# Patient Record
Sex: FEMALE | Race: WHITE | Employment: OTHER | URBAN - METROPOLITAN AREA
[De-identification: names, ages, dates, MRNs, and addresses within clinical notes are randomized per-mention and may not be internally consistent; named-entity substitution may affect disease eponyms.]

---

## 2017-09-21 PROBLEM — G43.009 ATYPICAL MIGRAINE: Status: ACTIVE | Noted: 2017-09-21

## 2017-09-21 PROBLEM — N18.30 STAGE 3 CHRONIC KIDNEY DISEASE (HCC): Chronic | Status: ACTIVE | Noted: 2017-09-21

## 2017-09-21 PROBLEM — E11.29 TYPE 2 DIABETES MELLITUS WITH RENAL COMPLICATION (HCC): Status: ACTIVE | Noted: 2017-09-21

## 2017-09-21 PROBLEM — R29.90 STROKE-LIKE SYMPTOMS: Status: ACTIVE | Noted: 2017-09-21

## 2017-09-21 PROBLEM — E78.5 HLD (HYPERLIPIDEMIA): Status: ACTIVE | Noted: 2017-09-21

## 2017-09-21 PROBLEM — I10 ESSENTIAL HYPERTENSION: Status: ACTIVE | Noted: 2017-09-21

## 2018-08-03 DIAGNOSIS — M54.17 L-S RADICULOPATHY: ICD-10-CM

## 2018-08-03 RX ORDER — GABAPENTIN 400 MG/1
400 CAPSULE ORAL 3 TIMES DAILY
Qty: 270 CAPSULE | Refills: 2 | Status: SHIPPED | OUTPATIENT
Start: 2018-08-03 | End: 2019-01-22 | Stop reason: SDUPTHER

## 2018-09-19 ENCOUNTER — TELEPHONE (OUTPATIENT)
Dept: NEUROLOGY | Age: 53
End: 2018-09-19

## 2019-01-22 ENCOUNTER — TELEPHONE (OUTPATIENT)
Dept: NEUROLOGY | Age: 54
End: 2019-01-22

## 2019-01-22 ENCOUNTER — OFFICE VISIT (OUTPATIENT)
Dept: NEUROLOGY | Age: 54
End: 2019-01-22
Payer: COMMERCIAL

## 2019-01-22 VITALS
HEART RATE: 50 BPM | WEIGHT: 171 LBS | RESPIRATION RATE: 12 BRPM | OXYGEN SATURATION: 98 % | HEIGHT: 64 IN | TEMPERATURE: 97.1 F | BODY MASS INDEX: 29.19 KG/M2 | SYSTOLIC BLOOD PRESSURE: 120 MMHG | DIASTOLIC BLOOD PRESSURE: 65 MMHG

## 2019-01-22 DIAGNOSIS — G43.109 MIGRAINE WITH AURA AND WITHOUT STATUS MIGRAINOSUS, NOT INTRACTABLE: Primary | ICD-10-CM

## 2019-01-22 DIAGNOSIS — M54.17 L-S RADICULOPATHY: ICD-10-CM

## 2019-01-22 PROBLEM — R29.90 STROKE-LIKE SYMPTOMS: Status: RESOLVED | Noted: 2017-09-21 | Resolved: 2019-01-22

## 2019-01-22 PROCEDURE — 99214 OFFICE O/P EST MOD 30 MIN: CPT | Performed by: NURSE PRACTITIONER

## 2019-01-22 RX ORDER — RIZATRIPTAN BENZOATE 5 MG/1
5 TABLET ORAL
Qty: 9 TABLET | Refills: 3 | Status: SHIPPED | OUTPATIENT
Start: 2019-01-22 | End: 2019-07-11 | Stop reason: HOSPADM

## 2019-01-22 RX ORDER — LOSARTAN POTASSIUM 50 MG/1
1 TABLET ORAL DAILY
Refills: 1 | COMMUNITY
Start: 2018-10-26

## 2019-01-22 RX ORDER — GABAPENTIN 400 MG/1
400 CAPSULE ORAL 3 TIMES DAILY
Qty: 270 CAPSULE | Refills: 1 | Status: SHIPPED | OUTPATIENT
Start: 2019-01-22 | End: 2019-12-23

## 2019-01-22 RX ORDER — TOPIRAMATE 50 MG/1
75 TABLET, FILM COATED ORAL 2 TIMES DAILY
Qty: 180 TABLET | Refills: 3 | Status: SHIPPED | OUTPATIENT
Start: 2019-01-22 | End: 2019-07-11

## 2019-01-24 ENCOUNTER — HOSPITAL ENCOUNTER (EMERGENCY)
Age: 54
Discharge: HOME | End: 2019-01-24
Attending: EMERGENCY MEDICINE
Payer: COMMERCIAL

## 2019-01-24 ENCOUNTER — APPOINTMENT (OUTPATIENT)
Dept: CT IMAGING | Age: 54
End: 2019-01-24
Payer: COMMERCIAL

## 2019-01-24 VITALS
TEMPERATURE: 98.4 F | HEART RATE: 61 BPM | SYSTOLIC BLOOD PRESSURE: 138 MMHG | RESPIRATION RATE: 16 BRPM | DIASTOLIC BLOOD PRESSURE: 75 MMHG | HEIGHT: 63 IN | BODY MASS INDEX: 29.23 KG/M2 | WEIGHT: 165 LBS | OXYGEN SATURATION: 97 %
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
## 2019-01-24 DIAGNOSIS — R51.9 NONINTRACTABLE HEADACHE, UNSPECIFIED CHRONICITY PATTERN, UNSPECIFIED HEADACHE TYPE: ICD-10-CM

## 2019-01-24 PROCEDURE — 99283 EMERGENCY DEPT VISIT LOW MDM: CPT

## 2019-01-24 PROCEDURE — 72125 CT NECK SPINE W/O DYE: CPT

## 2019-01-24 PROCEDURE — 6360000002 HC RX W HCPCS: Performed by: EMERGENCY MEDICINE

## 2019-01-24 PROCEDURE — 70450 CT HEAD/BRAIN W/O DYE: CPT

## 2019-01-24 PROCEDURE — 96375 TX/PRO/DX INJ NEW DRUG ADDON: CPT

## 2019-01-24 PROCEDURE — 2580000003 HC RX 258: Performed by: EMERGENCY MEDICINE

## 2019-01-24 PROCEDURE — 96374 THER/PROPH/DIAG INJ IV PUSH: CPT

## 2019-01-24 RX ORDER — NAPROXEN 375 MG/1
375 TABLET ORAL 2 TIMES DAILY
Qty: 60 TABLET | Refills: 0 | Status: SHIPPED | OUTPATIENT
Start: 2019-01-24 | End: 2019-08-14 | Stop reason: ALTCHOICE

## 2019-01-24 RX ORDER — DIPHENHYDRAMINE HYDROCHLORIDE 50 MG/ML
50 INJECTION INTRAMUSCULAR; INTRAVENOUS ONCE
Status: COMPLETED | OUTPATIENT
Start: 2019-01-24 | End: 2019-01-24

## 2019-01-24 RX ORDER — 0.9 % SODIUM CHLORIDE 0.9 %
1000 INTRAVENOUS SOLUTION INTRAVENOUS ONCE
Status: COMPLETED | OUTPATIENT
Start: 2019-01-24 | End: 2019-01-24

## 2019-01-24 RX ORDER — KETOROLAC TROMETHAMINE 30 MG/ML
15 INJECTION, SOLUTION INTRAMUSCULAR; INTRAVENOUS ONCE
Status: COMPLETED | OUTPATIENT
Start: 2019-01-24 | End: 2019-01-24

## 2019-01-24 RX ADMIN — SODIUM CHLORIDE 1000 ML: 9 INJECTION, SOLUTION INTRAVENOUS at 13:47

## 2019-01-24 RX ADMIN — PROCHLORPERAZINE EDISYLATE 10 MG: 5 INJECTION INTRAMUSCULAR; INTRAVENOUS at 13:56

## 2019-01-24 RX ADMIN — KETOROLAC TROMETHAMINE: 30 INJECTION, SOLUTION INTRAMUSCULAR; INTRAVENOUS at 13:48

## 2019-01-24 RX ADMIN — DIPHENHYDRAMINE HYDROCHLORIDE 50 MG: 50 INJECTION INTRAMUSCULAR; INTRAVENOUS at 13:49

## 2019-01-24 ASSESSMENT — ENCOUNTER SYMPTOMS
SORE THROAT: 0
SHORTNESS OF BREATH: 0
VOMITING: 0
SINUS PRESSURE: 0
EYE DISCHARGE: 0
BACK PAIN: 0
COUGH: 0
ABDOMINAL DISTENTION: 0
EYE PAIN: 0
EYE REDNESS: 0
WHEEZING: 0
DIARRHEA: 0
NAUSEA: 0

## 2019-01-24 ASSESSMENT — PAIN DESCRIPTION - PAIN TYPE: TYPE: ACUTE PAIN

## 2019-01-24 ASSESSMENT — PAIN DESCRIPTION - LOCATION: LOCATION: HEAD

## 2019-01-24 ASSESSMENT — PAIN SCALES - GENERAL
PAINLEVEL_OUTOF10: 2
PAINLEVEL_OUTOF10: 9
PAINLEVEL_OUTOF10: 9

## 2019-04-29 LAB
ALBUMIN SERPL-MCNC: 4.5 G/DL
ALP BLD-CCNC: 95 U/L
ALT SERPL-CCNC: 23 U/L
ANION GAP SERPL CALCULATED.3IONS-SCNC: 9 MMOL/L
AST SERPL-CCNC: 22 U/L
BILIRUB SERPL-MCNC: 0.9 MG/DL (ref 0.1–1.4)
BUN BLDV-MCNC: 20 MG/DL
CALCIUM SERPL-MCNC: 10.1 MG/DL
CHLORIDE BLD-SCNC: 109 MMOL/L
CHOLESTEROL, TOTAL: 144 MG/DL
CHOLESTEROL/HDL RATIO: NORMAL
CO2: 26 MMOL/L
CREAT SERPL-MCNC: 1.18 MG/DL
GFR CALCULATED: NORMAL
GLUCOSE BLD-MCNC: 112 MG/DL
HDLC SERPL-MCNC: 43 MG/DL (ref 35–70)
LDL CHOLESTEROL CALCULATED: 82 MG/DL (ref 0–160)
POTASSIUM SERPL-SCNC: 3.9 MMOL/L
SODIUM BLD-SCNC: 144 MMOL/L
TOTAL PROTEIN: 7.2
TRIGL SERPL-MCNC: 114 MG/DL
VLDLC SERPL CALC-MCNC: NORMAL MG/DL

## 2019-05-27 ENCOUNTER — HOSPITAL ENCOUNTER (EMERGENCY)
Age: 54
Discharge: HOME | End: 2019-05-27
Attending: EMERGENCY MEDICINE
Payer: COMMERCIAL

## 2019-05-27 VITALS
TEMPERATURE: 99.1 F | WEIGHT: 169 LBS | DIASTOLIC BLOOD PRESSURE: 74 MMHG | SYSTOLIC BLOOD PRESSURE: 112 MMHG | RESPIRATION RATE: 16 BRPM | HEIGHT: 63 IN | OXYGEN SATURATION: 95 % | BODY MASS INDEX: 29.95 KG/M2 | HEART RATE: 77 BPM

## 2019-05-27 DIAGNOSIS — K04.7 DENTAL ABSCESS: Primary | ICD-10-CM

## 2019-05-27 PROCEDURE — 99282 EMERGENCY DEPT VISIT SF MDM: CPT

## 2019-05-27 PROCEDURE — 6370000000 HC RX 637 (ALT 250 FOR IP): Performed by: EMERGENCY MEDICINE

## 2019-05-27 RX ORDER — CLINDAMYCIN HYDROCHLORIDE 150 MG/1
300 CAPSULE ORAL ONCE
Status: COMPLETED | OUTPATIENT
Start: 2019-05-27 | End: 2019-05-27

## 2019-05-27 RX ORDER — CLINDAMYCIN HYDROCHLORIDE 150 MG/1
150 CAPSULE ORAL 3 TIMES DAILY
Qty: 30 CAPSULE | Refills: 0 | Status: SHIPPED | OUTPATIENT
Start: 2019-05-27 | End: 2019-06-06

## 2019-05-27 RX ADMIN — CLINDAMYCIN HYDROCHLORIDE 300 MG: 150 CAPSULE ORAL at 11:43

## 2019-05-27 ASSESSMENT — PAIN DESCRIPTION - ORIENTATION: ORIENTATION: LOWER;LEFT

## 2019-05-27 ASSESSMENT — PAIN DESCRIPTION - LOCATION: LOCATION: TEETH

## 2019-05-27 ASSESSMENT — PAIN DESCRIPTION - DESCRIPTORS: DESCRIPTORS: THROBBING;STABBING

## 2019-05-27 ASSESSMENT — PAIN DESCRIPTION - PROGRESSION: CLINICAL_PROGRESSION: GRADUALLY WORSENING

## 2019-05-27 ASSESSMENT — PAIN SCALES - GENERAL: PAINLEVEL_OUTOF10: 10

## 2019-05-27 ASSESSMENT — PAIN DESCRIPTION - FREQUENCY: FREQUENCY: CONTINUOUS

## 2019-05-27 ASSESSMENT — PAIN DESCRIPTION - PAIN TYPE: TYPE: ACUTE PAIN

## 2019-05-27 NOTE — ED PROVIDER NOTES
HPI: Joy Ritter 62 y.o. female with a past medical history of   Past Medical History:   Diagnosis Date    Allergic 1980    PCN, Most Pain medications,    Anemia 1997    Anxiety 2007    Arthritis     Asthma 1968    Chronic kidney disease 1989    insufficincy    COPD (chronic obstructive pulmonary disease) ([address]     Crohn disease ([address] 2006    Crohn's disease ([address]     Diabetes mellitus ([address]     Essential hypertension 9/21/2017    Hyperlipidemia     Obesity 2000    Raynaud disease     Sleep apnea     wears CPAP    Tuberculosis 1980    presents with a complaint of dental pain. The patient states this pain has been gradual in onset, persistent, moderate in severity and worse today which is what prompted the visit. Pain has not been relieved with any OTC medications. Patient denies any unilateral facial swelling. Patient is able to handle their own secretions and drink fluids without difficulty. Patient denies any fever. The patient also denies any history of dental trauma. Denies difficulty breathing or swallowing. The location of the pain and appears to be isolated over tooth number 21.     ROS:   Pertinent positives and negatives are stated within HPI, all other systems reviewed and are negative.  --------------------------------------------- PAST HISTORY ---------------------------------------------  Past Medical History:  has a past medical history of Allergic, Anemia, Anxiety, Arthritis, Asthma, Chronic kidney disease, COPD (chronic obstructive pulmonary disease) ([address] Crohn disease ([address] Crohn's disease ([address] Diabetes mellitus ([address] Essential hypertension, Hyperlipidemia, Obesity, Raynaud disease, Sleep apnea, and Tuberculosis. Past Surgical History:  has a past surgical history that includes Hysterectomy (1997); fracture surgery (2013); Cholecystectomy (20001); Cardiac catheterization (2-); Cardiac catheterization (2007); and Colonoscopy (2005).     Social History:  reports that she has never smoked. She has never used smokeless tobacco. She reports that she drinks about 0.6 oz of alcohol per week. She reports that she does not use drugs. Family History: family history includes Cancer in her father; Diabetes in her brother and brother; Early Death in her sister and sister; Heart Attack in her brother; Heart Disease in her brother and brother. The patients home medications have been reviewed. Allergies: Cephalosporins; Codeine; and Penicillins    ------------------------- NURSING NOTES AND VITALS REVIEWED ---------------------------   The nursing notes within the ED encounter and vital signs as below have been reviewed by myself. /74   Pulse 77   Temp 99.1 °F (37.3 °C) (Oral)   Resp 16   Ht 5' 3\" (1.6 m)   Wt 169 lb (76.7 kg)   SpO2 95%   BMI 29.94 kg/m²   Oxygen Saturation Interpretation: Normal    The patients available past medical records and past encounters were reviewed. Physical exam:  Constitutional: The patient is comfortable, alert and oriented x3, well appearing, non toxic in NAD. Head: Atraumatic and normocephalic. Eyes: No discharge from the eyes the sclerae are normal.  ENT: The oropharynx is normal. No pharyngeal erythema, uvular edema, tonsillar exudates, asymmetry or trismus. Mouth is normal to inspection  With the exception of a pain on percussion of the tooth noted above. There is no evidence of facial asymmetry or abscess formation. Floor of the mouth is soft. No tenderness in the submental or submandibular space. No tongue elevation. Skin: The skin exam shows no evidence of rashes  Neuro: GCS is 15  Lymphatic: No cervical lymphadenopathy       -------------------------------------------------- RESULTS -------------------------------------------------  I have personally reviewed all laboratory and imaging results for this patient. Results are listed below. LABS:  No results found for this visit on 05/27/19.     RADIOLOGY:  Interpreted by Radiologist.  No orders to display               Medical Decision Making: Exam and history c/w  dental pain without evidence of gross infection. At this time we will  the patient on following up in dental clinic and provide pain relief.       Impression:   1) Dental Pain  2) Dental Caries    Disposition: Discharge  Condition: Stable                Grey Oppenheim, MD  05/27/19 9905

## 2019-07-11 ENCOUNTER — OFFICE VISIT (OUTPATIENT)
Dept: NEUROLOGY | Age: 54
End: 2019-07-11
Payer: COMMERCIAL

## 2019-07-11 VITALS
BODY MASS INDEX: 30.54 KG/M2 | DIASTOLIC BLOOD PRESSURE: 69 MMHG | WEIGHT: 178.9 LBS | TEMPERATURE: 97.4 F | HEART RATE: 44 BPM | SYSTOLIC BLOOD PRESSURE: 112 MMHG | OXYGEN SATURATION: 95 % | HEIGHT: 64 IN | RESPIRATION RATE: 12 BRPM

## 2019-07-11 DIAGNOSIS — G43.109 MIGRAINE WITH AURA AND WITHOUT STATUS MIGRAINOSUS, NOT INTRACTABLE: Primary | ICD-10-CM

## 2019-07-11 DIAGNOSIS — M54.17 L-S RADICULOPATHY: ICD-10-CM

## 2019-07-11 PROCEDURE — 99214 OFFICE O/P EST MOD 30 MIN: CPT | Performed by: NURSE PRACTITIONER

## 2019-07-11 RX ORDER — BUTALBITAL, ACETAMINOPHEN AND CAFFEINE 50; 325; 40 MG/1; MG/1; MG/1
1 TABLET ORAL EVERY 4 HOURS PRN
Qty: 20 TABLET | Refills: 3 | Status: SHIPPED | OUTPATIENT
Start: 2019-07-11 | End: 2019-11-06 | Stop reason: SDUPTHER

## 2019-07-11 RX ORDER — TOPIRAMATE 50 MG/1
TABLET, FILM COATED ORAL
Qty: 180 TABLET | Refills: 3
Start: 2019-07-11 | End: 2019-08-14 | Stop reason: ALTCHOICE

## 2019-07-11 SDOH — HEALTH STABILITY: MENTAL HEALTH: HOW OFTEN DO YOU HAVE A DRINK CONTAINING ALCOHOL?: MONTHLY OR LESS

## 2019-07-11 NOTE — PATIENT INSTRUCTIONS
Patient Education        erenumab  Pronunciation:  alfa SUSIE samson mab  Brand:  Thania Sandoval  What is the most important information I should know about erenumab? Follow all directions on your medicine label and package. Tell each of your healthcare providers about all your medical conditions, allergies, and all medicines you use. What is erenumab? Dorsie Laos is a monoclonal antibody that blocks the activation of a certain protein that can produce a migraine attack. This protein, called calcitonin gene- related peptide (CGRP), can cause blood vessels to dilate and cause inflammation and migraine headache pain. Dorsie Laos is used to prevent migraine headaches in adults. Dorsie Laos may also be used for purposes not listed in this medication guide. What should I discuss with my healthcare provider before using erenumab? You should not use erenumab if you are allergic to it. Tell your doctor if you have ever had:  · an allergy to latex. Tell your doctor if you are pregnant or plan to become pregnant. It is not known whether this medicine will harm an unborn baby. However, having migraine headaches during pregnancy may cause complications such as preeclampsia (dangerously high blood pressure that can lead to medical problems in both mother and baby). The benefit of preventing migraines may outweigh any risks to the baby. It may not be safe to breast-feed while using this medicine. Ask your doctor about any risk. Dorsie Laos is not approved for use by anyone younger than 25years old. How should I use erenumab? Follow all directions on your prescription label and read all medication guides or instruction sheets. Use the medicine exactly as directed. Erenumab is injected under the skin, usually once per month. A healthcare provider may teach you how to properly use the medication by yourself. Read and carefully follow any Instructions for Use provided with your medicine.  Do not use erenumab if you don't understand all barbiturates. It relaxes muscle contractions involved in a tension headache. Caffeine is a central nervous system stimulant. It relaxes muscle contractions in blood vessels to improve blood flow. Acetaminophen, butalbital, and caffeine is a combination medicine used to treat tension headaches that are caused by muscle contractions. Acetaminophen, butalbital, and caffeine may also be used for purposes not listed in this medication guide. What should I discuss with my healthcare provider before taking acetaminophen, butalbital, and caffeine? Do not use this medicine if you have taken an MAO inhibitor in the past 14 days. A dangerous drug interaction could occur. MAO inhibitors include isocarboxazid, linezolid, phenelzine, rasagiline, selegiline, and tranylcypromine. You should not use acetaminophen, butalbital, and caffeine if you are allergic to it, if you have porphyria, or if you have recently used alcohol, sedatives, tranquilizers, or other narcotic medications. To make sure acetaminophen, butalbital, and caffeine is safe for you, tell your doctor if you have:  · liver disease, cirrhosis, a history of alcoholism or drug addiction, or if you drink more than 3 alcoholic beverages per day;  · kidney disease;  · asthma, sleep apnea, or other breathing disorder;  · stomach ulcer or bleeding;  · a history of skin rash caused by any medication;  · a history of mental illness or suicidal thoughts; or  · if you use medicine to prevent blood clots. It is not known whether this medicine will harm an unborn baby. If you use butalbital while you are pregnant, your baby could become dependent on the drug. This can cause life-threatening withdrawal symptoms in the baby after it is born. Babies born dependent on habit-forming medicine may need medical treatment for several weeks. Tell your doctor if you are pregnant or plan to become pregnant. This medicine can pass into breast milk and may harm a nursing baby.  Tell your side effects. You may report side effects to FDA at 8-944-FDA-9240. What other drugs will affect acetaminophen, butalbital, and caffeine? Taking this medicine with other drugs that make you sleepy or slow your breathing can cause dangerous or life-threatening side effects. Ask your doctor before taking acetaminophen, butalbital, and caffeine with a sleeping pill, narcotic pain medicine, muscle relaxer, or medicine for anxiety, depression, or seizures. Other drugs may interact with acetaminophen, butalbital, and caffeine, including prescription and over-the-counter medicines, vitamins, and herbal products. Tell each of your health care providers about all medicines you use now and any medicine you start or stop using. Where can I get more information? Your pharmacist can provide more information about acetaminophen, butalbital, and caffeine. Remember, keep this and all other medicines out of the reach of children, never share your medicines with others, and use this medication only for the indication prescribed. Every effort has been made to ensure that the information provided by [address] is accurate, up-to-date, and complete, but no guarantee is made to that effect. Drug information contained herein may be time sensitive. [facility] information has been compiled for use by healthcare practitioners and consumers in the [place] and therefore [place] does not warrant that uses outside of the [place] are appropriate, unless specifically indicated otherwise. [facility]'s drug information does not endorse drugs, diagnose patients or recommend therapy. [facility]s drug information is an informational resource designed to assist licensed healthcare practitioners in caring for their patients and/or to serve consumers viewing this service as a supplement to, and not a substitute for, the expertise, skill, knowledge and judgment of healthcare practitioners.  The absence of a warning for a given

## 2019-07-11 NOTE — PROGRESS NOTES
in past, but med unavailable. She also thinks she failed amitriptyline in the past for prevention   On gabapentin for PN but no change in her headaches    She admits that increased stress is contributing---she continues to have issues with her ex . She notes some issues with word finding at times. She is sleeping fair. She eats and drinks normally but does not exercise    Her LS radiculopathy is stable on gabapentin 400 mg TID. She feels like she \"walks into walls\" at times but no falls or ataxia. No new pains or weakness. Hx lumbar CRISTOPHER in past as well as PT     Per self report, DM remains well controlled. Remains on ASA and Lipitor for hx of TIA    No chest pain or palpitations  No SOB  No vertigo, lightheadedness or loss of consciousness  No incontinence of bowels or bladder  No itching or bruising appreciated  No numbness, tingling or focal arm/leg weakness    ROS otherwise negative    Current Outpatient Medications   Medication Sig Dispense Refill    naproxen (NAPROSYN) 375 MG tablet Take 1 tablet by mouth 2 times daily 60 tablet 0    losartan (COZAAR) 50 MG tablet Take 1 tablet by mouth daily  1    gabapentin (NEURONTIN) 400 MG capsule Take 1 capsule by mouth 3 times daily for 181 days. . 270 capsule 1    topiramate (TOPAMAX) 50 MG tablet Take 1.5 tablets by mouth 2 times daily 180 tablet 3    rizatriptan (MAXALT) 5 MG tablet Take 1 tablet by mouth once as needed for Migraine May repeat in 2 hours if needed 9 tablet 3    butalbital-acetaminophen-caffeine (FIORICET, ESGIC) -40 MG per tablet Take 1 tablet by mouth every 4 hours as needed for Headaches 180 tablet 3    allopurinol (ZYLOPRIM) 100 MG tablet Take 100 mg by mouth daily       Multiple Vitamins-Minerals (WOMENS ONE DAILY PO) Take 1 tablet by mouth daily       diltiazem (CARDIZEM CD) 120 MG ER capsule TAKE 1 CAPSULE DAILY 90 capsule 3    PROVENTIL  (90 BASE) MCG/ACT inhaler Inhale 1 puff into the lungs every 4 hours as

## 2019-07-22 ENCOUNTER — TELEPHONE (OUTPATIENT)
Dept: NEUROLOGY | Age: 54
End: 2019-07-22

## 2019-08-14 ENCOUNTER — HOSPITAL ENCOUNTER (EMERGENCY)
Age: 54
Discharge: HOME | End: 2019-08-14
Payer: COMMERCIAL

## 2019-08-14 ENCOUNTER — APPOINTMENT (OUTPATIENT)
Dept: GENERAL RADIOLOGY | Age: 54
End: 2019-08-14
Payer: COMMERCIAL

## 2019-08-14 VITALS
OXYGEN SATURATION: 99 % | DIASTOLIC BLOOD PRESSURE: 74 MMHG | TEMPERATURE: 98.4 F | HEIGHT: 63 IN | BODY MASS INDEX: 31.36 KG/M2 | SYSTOLIC BLOOD PRESSURE: 132 MMHG | HEART RATE: 58 BPM | WEIGHT: 177 LBS | RESPIRATION RATE: 16 BRPM

## 2019-08-14 DIAGNOSIS — Z23 NEED FOR TDAP VACCINATION: ICD-10-CM

## 2019-08-14 DIAGNOSIS — T14.8XXA ABRASION: ICD-10-CM

## 2019-08-14 DIAGNOSIS — M25.512 ACUTE PAIN OF LEFT SHOULDER: ICD-10-CM

## 2019-08-14 DIAGNOSIS — S70.02XA CONTUSION OF LEFT HIP, INITIAL ENCOUNTER: ICD-10-CM

## 2019-08-14 DIAGNOSIS — S52.125A CLOSED NONDISPLACED FRACTURE OF HEAD OF LEFT RADIUS, INITIAL ENCOUNTER: Primary | ICD-10-CM

## 2019-08-14 PROCEDURE — 90471 IMMUNIZATION ADMIN: CPT | Performed by: PHYSICIAN ASSISTANT

## 2019-08-14 PROCEDURE — 73090 X-RAY EXAM OF FOREARM: CPT

## 2019-08-14 PROCEDURE — 73502 X-RAY EXAM HIP UNI 2-3 VIEWS: CPT

## 2019-08-14 PROCEDURE — 29105 APPLICATION LONG ARM SPLINT: CPT

## 2019-08-14 PROCEDURE — 99284 EMERGENCY DEPT VISIT MOD MDM: CPT

## 2019-08-14 PROCEDURE — 90715 TDAP VACCINE 7 YRS/> IM: CPT | Performed by: PHYSICIAN ASSISTANT

## 2019-08-14 PROCEDURE — 6360000002 HC RX W HCPCS: Performed by: PHYSICIAN ASSISTANT

## 2019-08-14 PROCEDURE — 73030 X-RAY EXAM OF SHOULDER: CPT

## 2019-08-14 RX ORDER — NAPROXEN 500 MG/1
500 TABLET ORAL 2 TIMES DAILY
Qty: 14 TABLET | Refills: 0 | Status: SHIPPED | OUTPATIENT
Start: 2019-08-14 | End: 2019-11-06

## 2019-08-14 RX ADMIN — TETANUS TOXOID, REDUCED DIPHTHERIA TOXOID AND ACELLULAR PERTUSSIS VACCINE, ADSORBED 0.5 ML: 5; 2.5; 8; 8; 2.5 SUSPENSION INTRAMUSCULAR at 19:57

## 2019-08-14 ASSESSMENT — PAIN DESCRIPTION - DESCRIPTORS: DESCRIPTORS: SORE

## 2019-08-14 ASSESSMENT — PAIN DESCRIPTION - ORIENTATION: ORIENTATION: LOWER

## 2019-08-14 ASSESSMENT — PAIN SCALES - GENERAL: PAINLEVEL_OUTOF10: 6

## 2019-08-14 ASSESSMENT — PAIN DESCRIPTION - PAIN TYPE: TYPE: ACUTE PAIN

## 2019-08-14 ASSESSMENT — PAIN DESCRIPTION - LOCATION: LOCATION: ARM

## 2019-08-15 ENCOUNTER — TELEPHONE (OUTPATIENT)
Dept: ADMINISTRATIVE | Age: 54
End: 2019-08-15

## 2019-08-15 NOTE — ED NOTES
Long arm splint placed to left arm per [facility]. Neuros intact pre and post splinting. Discharge instructions and prescription given. Patient states verbal understanding. Ambulatory to exit.        Fritz Douglas RN  08/14/19 8121

## 2019-08-20 DIAGNOSIS — S52.125A CLOSED NONDISPLACED FRACTURE OF HEAD OF LEFT RADIUS, INITIAL ENCOUNTER: Primary | ICD-10-CM

## 2019-08-20 DIAGNOSIS — M25.512 ACUTE PAIN OF LEFT SHOULDER: ICD-10-CM

## 2019-08-21 ENCOUNTER — HOSPITAL ENCOUNTER (OUTPATIENT)
Dept: GENERAL RADIOLOGY | Age: 54
Discharge: HOME | End: 2019-08-23
Payer: COMMERCIAL

## 2019-08-21 ENCOUNTER — OFFICE VISIT (OUTPATIENT)
Dept: ORTHOPEDIC SURGERY | Age: 54
End: 2019-08-21
Payer: COMMERCIAL

## 2019-08-21 VITALS
HEIGHT: 64 IN | SYSTOLIC BLOOD PRESSURE: 135 MMHG | HEART RATE: 41 BPM | BODY MASS INDEX: 30.39 KG/M2 | WEIGHT: 178 LBS | DIASTOLIC BLOOD PRESSURE: 68 MMHG

## 2019-08-21 DIAGNOSIS — M79.642 LEFT HAND PAIN: Primary | ICD-10-CM

## 2019-08-21 DIAGNOSIS — S52.125A CLOSED NONDISPLACED FRACTURE OF HEAD OF LEFT RADIUS, INITIAL ENCOUNTER: ICD-10-CM

## 2019-08-21 DIAGNOSIS — M25.512 ACUTE PAIN OF LEFT SHOULDER: ICD-10-CM

## 2019-08-21 DIAGNOSIS — M79.642 LEFT HAND PAIN: ICD-10-CM

## 2019-08-21 DIAGNOSIS — S52.122A CLOSED DISPLACED FRACTURE OF HEAD OF LEFT RADIUS, INITIAL ENCOUNTER: ICD-10-CM

## 2019-08-21 PROCEDURE — 99202 OFFICE O/P NEW SF 15 MIN: CPT | Performed by: PHYSICIAN ASSISTANT

## 2019-08-21 PROCEDURE — 73030 X-RAY EXAM OF SHOULDER: CPT

## 2019-08-21 PROCEDURE — 73130 X-RAY EXAM OF HAND: CPT

## 2019-08-21 PROCEDURE — 73080 X-RAY EXAM OF ELBOW: CPT

## 2019-08-21 PROCEDURE — 99204 OFFICE O/P NEW MOD 45 MIN: CPT | Performed by: PHYSICIAN ASSISTANT

## 2019-08-21 PROCEDURE — 73090 X-RAY EXAM OF FOREARM: CPT

## 2019-08-21 NOTE — PROGRESS NOTES
Pain     Lt wrist pain     Hand Pain     lt hand and ring finger pain     DOI: 8/14/19 non op left radial head fracture     SUBJECTIVE:  This is a 62year old female presenting to the office for recheck following acute injury on 8/14/2019. Patient states that she fell directly on her left side injuring her left shoulder, elbow, index finger and left hip. Since the original injury she states she has no complaints of left hip pain. Shoulder pain is improving. Continues to note left elbow pain as well as left index finger pain. Denies any numbness or tingling in association. Has remained in her sling, nonweightbearing. Denies any other orthopedic complaints or paresthesias. Denies shortness of breath, chest pain, or calf pain. Denies any constitutional symptoms. Review of Systems   Constitutional: Negative for fever, chills, diaphoresis, appetite change and fatigue. HENT: Negative for dental issues, hearing loss and tinnitus. Negative for congestion, sinus pressure, sneezing, sore throat. Negative for headache. Eyes: Negative for visual disturbance, blurred and double vision. Negative for pain, discharge, redness and itching  Respiratory: Negative for cough, shortness of breath and wheezing. Cardiovascular: Negative for chest pain, palpitations and leg swelling. No dyspnea on exertion   Gastrointestinal:   Negative for nausea, vomiting, abdominal pain, diarrhea, constipation  or black or bloody. Hematologic\Lymphatic:  negative for bleeding, petechiae,   Genitourinary: Negative for hematuria and difficulty urinating. Musculoskeletal: Negative for neck pain and stiffness. Negative for back pain, see HPI  Skin: Negative for pallor, rash and wound. Neurological: Negative for dizziness, tremors, seizures, weakness, light-headedness, no TIA or stroke symptoms. No numbness and headaches. Psychiatric/Behavioral: Negative.         OBJECTIVE:      Physical Examination:   General appearance: alert, well appearing, and in no distress,  normal appearing weight. No visible signs of trauma   Mental status: alert, oriented to person, place, and time, normal mood, behavior, speech, dress, motor activity, and thought processes  Abdomen: soft, nondistended  Resp:   resp easy and unlabored, no audible wheezes note, normal symmetrical expansion of both hemithoraces  Cardiac: distal pulses palpable, skin and extremities well perfused  Neurological: alert, oriented X3, normal speech, no focal findings or movement disorder noted, motor and sensory grossly normal bilaterally, normal muscle tone, no tremors, strength 5/5, normal gait and station  HEENT: normochephalic atraumatic, external ears and eyes normal, sclera normal, neck supple, no nasal discharge. Extremities:   peripheral pulses normal, no edema, redness or tenderness in the calves   Skin: normal coloration, no rashes or open wounds, no suspicious skin lesions noted  Psych: Affect euthymic   Musculoskeletal:   Extremity:  left Upper Extremity Exam:    Skin intact not broken down, no signs of infection. No erythema/induration/fluctuence present. mild swelling present about the left elbow, no ecchymosis present. tenderness to palpation left elbow diffusely, mild about the 2nd index finger, non over the left shoulder and wrist.  Has full supination and pronation, no catching noted with motion, mild pain. Flexion and extension intact about the elbow and wrist. Full shoulder ROM noted. Able to make a fist but complains of pain about the 2nd finger again. No active triggering of the 2nd finger, no extensor tendon subluxation. No collateral ligament laxity noted, no specific anatomic pain. Palpable distal pulses, cap refill brisk in all digits. Demonstrates active range of motion of all digits. Motor function intact to anterior interosseous/posterior interosseous/ulnar distributions to hand.  Sensation intact to touch in radial/ulnar/median nerve distributions to hand. Compartments supple throughout arm and forearm. /68   Pulse (!) 41 Comment: always low \"runs in the family\"  Ht 5' 3.5\" (1.613 m)   Wt 178 lb (80.7 kg)   BMI 31.04 kg/m²      XR: 8/21/19   Xrays obtained of the left hand and elbow. Xrays of the left hand demonstrate no evidence of acute fracture, pathology, or bony deformity. X-rays of the left elbow demonstrate a relatively nondisplaced radial head fracture, joint space well-maintained. No other acute osseous abnormalities appreciated. ASSESSMENT:     Diagnosis Orders   1. Left hand pain  XR HAND LEFT (MIN 3 VIEWS)   2. Closed displaced fracture of head of left radius, initial encounter  External Referral To Occupational Therapy    XR ELBOW LEFT (MIN 3 VIEWS)        Discussion:  A discussion was had with patient regarding her diagnosis and treatment recommendations. Recommend she discontinue her sling, work on elbow range of motion. Remain nonweightbearing. Work on modalities of the left hand. Ice and elevation as needed. Return to the office in 3 weeks time.     PLAN:  Remain nonweightbearing of the left upper extremity  Discontinue sling, work on elbow range of motion  Remain off work until September 3, with no new use of the left upper extremity  Start occupational therapy, prescription provided today  Return to the office in 3 weeks time xrays of the left elbow to be obtained   Call if any issues or concerns    Electronically signed by Sanjiv Walton PA-C on 8/23/2019 at 9:42 AM

## 2019-09-11 ENCOUNTER — OFFICE VISIT (OUTPATIENT)
Dept: ORTHOPEDIC SURGERY | Age: 54
End: 2019-09-11
Payer: COMMERCIAL

## 2019-09-11 ENCOUNTER — HOSPITAL ENCOUNTER (OUTPATIENT)
Dept: GENERAL RADIOLOGY | Age: 54
Discharge: HOME | End: 2019-09-13
Payer: COMMERCIAL

## 2019-09-11 VITALS
DIASTOLIC BLOOD PRESSURE: 71 MMHG | WEIGHT: 165 LBS | HEART RATE: 42 BPM | BODY MASS INDEX: 28.77 KG/M2 | SYSTOLIC BLOOD PRESSURE: 120 MMHG

## 2019-09-11 DIAGNOSIS — S52.122A CLOSED DISPLACED FRACTURE OF HEAD OF LEFT RADIUS, INITIAL ENCOUNTER: Primary | ICD-10-CM

## 2019-09-11 DIAGNOSIS — S52.122A CLOSED DISPLACED FRACTURE OF HEAD OF LEFT RADIUS, INITIAL ENCOUNTER: ICD-10-CM

## 2019-09-11 PROCEDURE — 24650 CLTX RDL HEAD/NCK FX WO MNPJ: CPT | Performed by: ORTHOPAEDIC SURGERY

## 2019-09-11 PROCEDURE — 99212 OFFICE O/P EST SF 10 MIN: CPT

## 2019-09-11 PROCEDURE — 73080 X-RAY EXAM OF ELBOW: CPT

## 2019-09-11 PROCEDURE — 99213 OFFICE O/P EST LOW 20 MIN: CPT | Performed by: ORTHOPAEDIC SURGERY

## 2019-10-30 ENCOUNTER — HOSPITAL ENCOUNTER (OUTPATIENT)
Dept: GENERAL RADIOLOGY | Age: 54
Discharge: HOME | End: 2019-11-01
Payer: COMMERCIAL

## 2019-10-30 ENCOUNTER — OFFICE VISIT (OUTPATIENT)
Dept: ORTHOPEDIC SURGERY | Age: 54
End: 2019-10-30
Payer: COMMERCIAL

## 2019-10-30 VITALS — OXYGEN SATURATION: 99 % | HEIGHT: 63 IN | BODY MASS INDEX: 29.77 KG/M2 | WEIGHT: 168 LBS | HEART RATE: 63 BPM

## 2019-10-30 DIAGNOSIS — S52.122A CLOSED DISPLACED FRACTURE OF HEAD OF LEFT RADIUS, INITIAL ENCOUNTER: ICD-10-CM

## 2019-10-30 DIAGNOSIS — S52.122D CLOSED DISPLACED FRACTURE OF HEAD OF LEFT RADIUS WITH ROUTINE HEALING, SUBSEQUENT ENCOUNTER: Primary | ICD-10-CM

## 2019-10-30 PROCEDURE — 99024 POSTOP FOLLOW-UP VISIT: CPT | Performed by: PHYSICIAN ASSISTANT

## 2019-10-30 PROCEDURE — 99212 OFFICE O/P EST SF 10 MIN: CPT

## 2019-10-30 PROCEDURE — 73080 X-RAY EXAM OF ELBOW: CPT

## 2019-11-06 ENCOUNTER — OFFICE VISIT (OUTPATIENT)
Dept: NEUROLOGY | Age: 54
End: 2019-11-06
Payer: COMMERCIAL

## 2019-11-06 VITALS
SYSTOLIC BLOOD PRESSURE: 92 MMHG | TEMPERATURE: 98.1 F | HEIGHT: 63 IN | DIASTOLIC BLOOD PRESSURE: 66 MMHG | HEART RATE: 60 BPM | BODY MASS INDEX: 29.23 KG/M2 | WEIGHT: 165 LBS | RESPIRATION RATE: 12 BRPM

## 2019-11-06 DIAGNOSIS — Z86.73 HX-TIA (TRANSIENT ISCHEMIC ATTACK): ICD-10-CM

## 2019-11-06 DIAGNOSIS — G43.109 MIGRAINE WITH AURA AND WITHOUT STATUS MIGRAINOSUS, NOT INTRACTABLE: Primary | ICD-10-CM

## 2019-11-06 DIAGNOSIS — M54.17 L-S RADICULOPATHY: ICD-10-CM

## 2019-11-06 PROCEDURE — 99214 OFFICE O/P EST MOD 30 MIN: CPT | Performed by: NURSE PRACTITIONER

## 2019-11-06 RX ORDER — BUTALBITAL, ACETAMINOPHEN AND CAFFEINE 50; 325; 40 MG/1; MG/1; MG/1
1 TABLET ORAL EVERY 4 HOURS PRN
Qty: 20 TABLET | Refills: 3 | Status: SHIPPED | OUTPATIENT
Start: 2019-11-06

## 2019-11-06 RX ORDER — ESCITALOPRAM OXALATE 20 MG/1
1 TABLET ORAL DAILY
Refills: 3 | COMMUNITY
Start: 2019-10-18

## 2019-11-06 RX ORDER — RIZATRIPTAN BENZOATE 5 MG/1
5 TABLET ORAL PRN
COMMUNITY
Start: 2019-01-22 | End: 2020-04-28 | Stop reason: SDUPTHER

## 2019-12-08 DIAGNOSIS — G43.109 MIGRAINE WITH AURA AND WITHOUT STATUS MIGRAINOSUS, NOT INTRACTABLE: ICD-10-CM

## 2019-12-09 RX ORDER — TOPIRAMATE 50 MG/1
TABLET, FILM COATED ORAL
Qty: 270 TABLET | Refills: 2 | OUTPATIENT
Start: 2019-12-09

## 2019-12-23 DIAGNOSIS — M54.17 L-S RADICULOPATHY: ICD-10-CM

## 2019-12-23 RX ORDER — GABAPENTIN 400 MG/1
CAPSULE ORAL
Qty: 270 CAPSULE | Refills: 1 | Status: SHIPPED
Start: 2019-12-23 | End: 2020-06-18

## 2020-03-30 RX ORDER — ERENUMAB-AOOE 70 MG/ML
INJECTION SUBCUTANEOUS
Qty: 1 PEN | Refills: 3 | Status: SHIPPED
Start: 2020-03-30 | End: 2020-04-28 | Stop reason: DRUGHIGH

## 2020-04-20 ENCOUNTER — APPOINTMENT (OUTPATIENT)
Dept: GENERAL RADIOLOGY | Age: 55
End: 2020-04-20
Payer: COMMERCIAL

## 2020-04-20 ENCOUNTER — HOSPITAL ENCOUNTER (EMERGENCY)
Age: 55
Discharge: HOME | End: 2020-04-20
Attending: EMERGENCY MEDICINE
Payer: COMMERCIAL

## 2020-04-20 VITALS
HEIGHT: 63 IN | TEMPERATURE: 98.2 F | HEART RATE: 56 BPM | WEIGHT: 180 LBS | DIASTOLIC BLOOD PRESSURE: 88 MMHG | BODY MASS INDEX: 31.89 KG/M2 | OXYGEN SATURATION: 98 % | RESPIRATION RATE: 18 BRPM | SYSTOLIC BLOOD PRESSURE: 132 MMHG

## 2020-04-20 PROCEDURE — 73564 X-RAY EXAM KNEE 4 OR MORE: CPT

## 2020-04-20 PROCEDURE — 6370000000 HC RX 637 (ALT 250 FOR IP): Performed by: EMERGENCY MEDICINE

## 2020-04-20 PROCEDURE — 72220 X-RAY EXAM SACRUM TAILBONE: CPT

## 2020-04-20 PROCEDURE — 73080 X-RAY EXAM OF ELBOW: CPT

## 2020-04-20 PROCEDURE — 99284 EMERGENCY DEPT VISIT MOD MDM: CPT

## 2020-04-20 RX ORDER — IBUPROFEN 400 MG/1
400 TABLET ORAL ONCE
Status: COMPLETED | OUTPATIENT
Start: 2020-04-20 | End: 2020-04-20

## 2020-04-20 RX ADMIN — IBUPROFEN 400 MG: 400 TABLET, FILM COATED ORAL at 19:00

## 2020-04-20 ASSESSMENT — PAIN SCALES - GENERAL
PAINLEVEL_OUTOF10: 7
PAINLEVEL_OUTOF10: 7

## 2020-04-20 ASSESSMENT — PAIN DESCRIPTION - PAIN TYPE: TYPE: ACUTE PAIN

## 2020-04-20 ASSESSMENT — PAIN DESCRIPTION - LOCATION: LOCATION: LEG;ARM

## 2020-04-20 ASSESSMENT — PAIN DESCRIPTION - ORIENTATION: ORIENTATION: RIGHT

## 2020-04-20 NOTE — ED PROVIDER NOTES
HPI:  4/20/20, Time: 6:28 PM EDT        Brittanie Rosenbaum is a 60 y.o. female presenting to the ED for fall up stairs injuring L elbow, L knee and \"tailbone\" beginning 1 hour ago. The complaint has been persistent, severe in severity, and worsened by changing position. No neck injury, no increase in patient's chronic back pain no abdominal pain no chest pain or shortness of breath no lightheadedness or dizziness no other complaints. Review of Systems:   Pertinent positives and negatives are stated within HPI, all other systems reviewed and are negative.      --------------------------------------------- PAST HISTORY ---------------------------------------------  Past Medical History:  has a past medical history of Allergic, Anemia, Anxiety, Arm fracture, left, Arthritis, Asthma, Cerebral artery occlusion with cerebral infarction ([address] Chronic kidney disease, COPD (chronic obstructive pulmonary disease) ([facility] Crohn disease ([address] Crohn's disease ([facility] [address] [facility] Diabetes mellitus ([address] Essential hypertension, Hyperlipidemia, L-S radiculopathy, Obesity, Raynaud disease, Sleep apnea, and Tuberculosis. Past Surgical History:  has a past surgical history that includes Hysterectomy (1997); fracture surgery (2013); Cholecystectomy (20001); Cardiac catheterization (2-); Cardiac catheterization (2007); and Colonoscopy (2005). Social History:  reports that she has never smoked. She has never used smokeless tobacco. She reports current alcohol use of about 1.0 standard drinks of alcohol per week. She reports that she does not use drugs. Family History: family history includes Cancer in her father; Diabetes in her brother and brother; Early Death in her sister and sister; Heart Attack in her brother; Heart Disease in her brother and brother. The patients home medications have been reviewed. Allergies: Cephalosporins;  Codeine; and Penicillins    -------------------------------------------------- RESULTS

## 2020-04-28 ENCOUNTER — OFFICE VISIT (OUTPATIENT)
Dept: NEUROLOGY | Age: 55
End: 2020-04-28
Payer: COMMERCIAL

## 2020-04-28 VITALS
TEMPERATURE: 97.5 F | OXYGEN SATURATION: 100 % | HEART RATE: 65 BPM | WEIGHT: 187 LBS | SYSTOLIC BLOOD PRESSURE: 115 MMHG | DIASTOLIC BLOOD PRESSURE: 70 MMHG | HEIGHT: 63 IN | BODY MASS INDEX: 33.13 KG/M2 | RESPIRATION RATE: 12 BRPM

## 2020-04-28 PROCEDURE — 99214 OFFICE O/P EST MOD 30 MIN: CPT | Performed by: NURSE PRACTITIONER

## 2020-04-28 RX ORDER — ERENUMAB-AOOE 140 MG/ML
140 INJECTION, SOLUTION SUBCUTANEOUS
Qty: 1 PEN | Refills: 0 | COMMUNITY
Start: 2020-04-28 | End: 2020-07-21

## 2020-04-28 RX ORDER — ERENUMAB-AOOE 140 MG/ML
140 INJECTION, SOLUTION SUBCUTANEOUS
Qty: 3 PEN | Refills: 1 | Status: SHIPPED
Start: 2020-04-28 | End: 2020-07-21 | Stop reason: SDUPTHER

## 2020-04-28 RX ORDER — RIZATRIPTAN BENZOATE 5 MG/1
5 TABLET ORAL DAILY PRN
Qty: 10 TABLET | Refills: 3 | Status: SHIPPED
Start: 2020-04-28 | End: 2020-07-21

## 2020-04-28 NOTE — PATIENT INSTRUCTIONS
during or right after stressful times. Take time to relax before and after you do something that has caused a migraine in the past.  · Try to keep your muscles relaxed by keeping good posture. Check your jaw, face, neck, and shoulder muscles for tension. Try to relax them. When sitting at a desk, change positions often. Stretch for 30 seconds each hour. · Get regular sleep and exercise. · Eat regular meals, and avoid foods and drinks that often trigger migraines. These include chocolate and alcohol, especially red wine and port. Chemicals used in food, such as aspartame and monosodium glutamate (MSG), also can trigger migraines. So can some food additives, such as those found in hot dogs, wolfe, cold cuts, aged cheeses, and pickled foods. · Limit caffeine by not drinking too much coffee, tea, or soda. Do not quit caffeine suddenly, because that can also give you migraines. · Do not smoke or allow others to smoke around you. If you need help quitting, talk to your doctor about stop-smoking programs and medicines. These can increase your chances of quitting for good. · If you are taking birth control pills or hormone therapy, talk to your doctor about whether they are triggering your migraines. When should you call for help? Call 911 anytime you think you may need emergency care. For example, call if:    · You have symptoms of a stroke. These may include:  ? Sudden numbness, tingling, weakness, or loss of movement in your face, arm, or leg, especially on only one side of your body. ? Sudden vision changes. ? Sudden trouble speaking. ? Sudden confusion or trouble understanding simple statements. ? Sudden problems with walking or balance.   ? A sudden, severe headache that is different from past headaches.    Call your doctor now or seek immediate medical care if:    · You develop a fever and a stiff neck.     · You have new nausea and vomiting, or you cannot keep down food or liquids.    Watch closely for

## 2020-04-28 NOTE — PROGRESS NOTES
normocephalic; atraumatic--no tenderness today  Eyes: sclerae/conjunctivae clear   Neck: No carotid bruit; full range of motion without cervicalgia  Lungs: clear to auscultation bilaterally, respirations unlabored   Heart: regular rate and rhythm--no murmur  Extremities: Slight vascular discoloration BLE with some atrophy of calves; no cyanosis or edema  Pulses: 2+ and symmetric all extremities  Skin: color, texture and turgor normal, no rashes or lesions     Mental Status: alert; oriented x4--very pleasant    Appropriate attention/concentration  Intact memories and fundus of knowledge    Speech: no dysarthria  Language: no aphasias--object recognition intact conversation fluent    Cranial Nerves:  I: smell    II: visual acuity     II: visual fields Full to confrontation   II: pupils PERRL   III,VII: ptosis None   III,IV,VI: extraocular muscles  Full ROM without nystagmus   V: mastication Normal   V: facial light touch sensation  Normal   V,VII: corneal reflex     VII: facial muscle function - upper  Normal   VII: facial muscle function - lower Normal   VIII: hearing Normal   IX: soft palate elevation  Normal   IX,X: gag reflex    XI: trapezius strength  5/5   XI: sternocleidomastoid strength 5/5   XI: neck extension strength  5/5   XII: tongue strength  Normal     Motor:  5/5 throughout   No drift  Increased tone in legs; overweight bulk  No abnormal movements    Sensory:  LT decreased left arm and leg  PP decreased left arm; intact BLE  Vibration moderately impaired at both ankles    Coordination:   FTN, JOHN, FFM, intact bilaterally     Gait:  Cautious and unsteady with turns; right knee appears to catch at times    DTR:   Right Brachioradialis reflex 1+   Left Brachioradialis reflex 1+  Right Biceps reflex 2+  Left Biceps reflex 2+  Right Triceps reflex 1+  Left Triceps reflex 1+  Right Quadriceps reflex 3+  Left Quadriceps reflex 3+  Right Achilles reflex 1+  Left Achilles reflex 1+     No [employer]    No

## 2020-04-29 ENCOUNTER — TELEPHONE (OUTPATIENT)
Dept: PHYSICAL MEDICINE AND REHAB | Age: 55
End: 2020-04-29

## 2020-04-30 ENCOUNTER — TELEPHONE (OUTPATIENT)
Dept: NEUROLOGY | Age: 55
End: 2020-04-30

## 2020-05-04 ENCOUNTER — HOSPITAL ENCOUNTER (OUTPATIENT)
Dept: MRI IMAGING | Age: 55
Discharge: HOME | End: 2020-05-06
Payer: COMMERCIAL

## 2020-05-04 PROCEDURE — 72141 MRI NECK SPINE W/O DYE: CPT

## 2020-05-04 PROCEDURE — 70551 MRI BRAIN STEM W/O DYE: CPT

## 2020-05-05 ENCOUNTER — TELEPHONE (OUTPATIENT)
Dept: NEUROLOGY | Age: 55
End: 2020-05-05

## 2020-05-05 NOTE — TELEPHONE ENCOUNTER
----- Message from RAYSA Morton CNP sent at 5/5/2020 10:43 AM EDT -----  Regarding: MRIs  MRI brain was ok. MRI cervical spine showed degenerative discs more around C4-C5-and C5-C6. No major compression on her cord but there were some mild changes from this degeneration, which can worsen over time and cause issues waling. PT has been ordered.  Will await EMG.  ----- Message -----  From: Sunny Lock Incoming Radiant Results From [facility]  Sent: 5/5/2020   2:14 AM EDT  To: RAYSA Morton CNP

## 2020-05-20 ENCOUNTER — HOSPITAL ENCOUNTER (OUTPATIENT)
Dept: NEUROLOGY | Age: 55
Discharge: HOME | End: 2020-05-20
Payer: COMMERCIAL

## 2020-05-20 ENCOUNTER — TELEPHONE (OUTPATIENT)
Dept: NEUROLOGY | Age: 55
End: 2020-05-20

## 2020-05-20 VITALS — HEIGHT: 63 IN | WEIGHT: 185 LBS | BODY MASS INDEX: 32.78 KG/M2

## 2020-05-20 PROCEDURE — 95886 MUSC TEST DONE W/N TEST COMP: CPT | Performed by: PSYCHIATRY & NEUROLOGY

## 2020-05-20 PROCEDURE — 95911 NRV CNDJ TEST 9-10 STUDIES: CPT

## 2020-05-20 PROCEDURE — 95886 MUSC TEST DONE W/N TEST COMP: CPT

## 2020-05-20 PROCEDURE — 95911 NRV CNDJ TEST 9-10 STUDIES: CPT | Performed by: PSYCHIATRY & NEUROLOGY

## 2020-05-20 NOTE — PROCEDURES
[place]  [facility].  Electrodiagnostic Laboratory   [facility]         Full Name: Elsy Garcias Gender: Female  MRN: [id number] YOB: 1965  Location[de-identified] [facility] (2)      Visit Date: 5/20/2020 07:57  Age: 60 Years 2 Months Old  Examining Physician: Dr. Gabby Cullen   Referring Physician: SHAISTA Sanabria  Technician: Micheline Han   Height: 5 feet 3 inch  Weight: 185 lbs  Notes: Frequent falls; L-S radiculopathy    BMI:  32.8      Motor NCS      Nerve / Sites Lat. Lat Diff Amplitude Amp. 1-2 Distance Velocity Temp.    ms ms mV % cm m/s °C   R Tibial - AH      Ankle 3.75  5.6 100 8  31.4      Pop fossa 13.28 9.53 3.1 55.3 40 42 31.4   L Peroneal - EDB      Ankle 3.80  7.0 100 8  31.2      Pop fossa 10.36 6.56 5.3 76 32 49 31.3   R Peroneal - EDB      Ankle 3.75  5.5 100 8  31.3      Pop fossa 9.69 5.94 5.5 99.8 31 52 31.3   L Tibial - AH      Ankle 3.91  4.8 100 8  31.2      Pop fossa 13.70 9.79 2.8 58.1 40 41 31.2       Sensory NCS      Nerve / Sites Onset Lat Peak Lat PP Amp Distance Velocity Temp.    ms ms µV cm m/s °C   R Superficial peroneal - Ankle      Lat leg 2.19 2.97 8.9 10 46 31.4   L Superficial peroneal - Ankle      Lat leg 2.08 3.02 5.6 10 48 31.4   R Sural - Ankle (Calf)      Calf 2.45 3.33 9.5 14 57 32.4   L Sural - Ankle (Calf)      Calf 2.40 3.49 7.6 14 58 31.6       F  Wave      Nerve F Lat M Lat F-M Lat    ms ms ms   R Peroneal - EDB 44.1 3.9 40.2   R Tibial - AH 48.0 5.6 42.4   L Peroneal - EDB 44.5 5.1 39.4   L Tibial - AH 48.9 4.7 44.2       EMG         EMG Summary Table     Spontaneous MUAP Recruitment   Muscle IA Fib PSW Fasc H.F. Amp Dur. PPP Pattern   R. Sacral paraspinals N None None None None N N N N   R. Lumbar paraspinals (low) N None None None None N N N N   R. Lumbar paraspinals (mid) N None None None None N N N N   R. Gluteus medius N None None None None N N N N   R. Biceps femoris (short head) N None None None None N N N N   R.  Vastus lateralis N None None None None N N N N   R. Gastrocnemius (Medial head) N None None None None N N N N   R. Tibialis anterior N None None None None N 1+ Few N   R. Flexor digitorum longus N None None None None N N N N   R. Extensor hallucis longus N None None None None N N N N   R. Extensor digitorum longus N None None None None N N N N   R. Iliopsoas N None None None None N N N N         Nerve conduction studies in both legs disclosed no abnormalities. These findings were compared to the referential values in this laboratory, available upon request.    Monopolar needle examination of the right leg displayed no abnormalities. Needle testing of the paraspinals was unremarkable. Electrodiagnostic examination of both legs was normal at this time. There were no peripheral neuropathies. There were no motor radiculopathies or intracanalicular lesions. Sensory radiculopathies cannot be evaluated by electrodiagnostic means. Clinically, the patient presented with intermittent falling and sporadic weakness of both legs. On brief neurological examination, I found no motor or sensory compromise. Her difficulties may be related to musculoskeletal issues. Clinical correlation was highly advised.

## 2020-05-20 NOTE — TELEPHONE ENCOUNTER
I have her off work because of her balance issues, not migraines. I can give her two more weeks off as long as she gets into PT. Please prepare a letter for me to sign when I return to work next [employer]. Any other time off or short term disability thereafter will need to be handled by PCP.

## 2020-05-20 NOTE — LETTER
[facility]  [address] [place] [address] Kristi Ayala [address]  Phone: [phone]  Fax: [phone]      May 26, 2020      Patient: Judie Ma   YOB: 1965   Date of Visit: 4/28/2020       To Whom It May Concern: It is my medical opinion that Judie Ma should remain out of work until 6/11/20 while she undergoes additional treatment for falls and gait instability. If you have any questions or concerns, please don't hesitate to call.       Sincerely,        RAYSA Ochoa - CNP

## 2020-06-18 RX ORDER — GABAPENTIN 400 MG/1
400 CAPSULE ORAL 3 TIMES DAILY
Qty: 270 CAPSULE | Refills: 1 | Status: SHIPPED
Start: 2020-06-18 | End: 2020-12-21

## 2020-07-20 RX ORDER — ERENUMAB-AOOE 70 MG/ML
INJECTION SUBCUTANEOUS
Qty: 1 PEN | Refills: 3 | OUTPATIENT
Start: 2020-07-20

## 2020-07-20 NOTE — TELEPHONE ENCOUNTER
Aimovig 70mg refill request denied as pt no longer on this dose.   Electronically signed by Wil Castaneda on 7/20/20 at 8:12 AM EDT

## 2020-07-21 ENCOUNTER — TELEPHONE (OUTPATIENT)
Dept: NEUROLOGY | Age: 55
End: 2020-07-21

## 2020-07-21 ENCOUNTER — OFFICE VISIT (OUTPATIENT)
Dept: NEUROLOGY | Age: 55
End: 2020-07-21
Payer: COMMERCIAL

## 2020-07-21 VITALS
WEIGHT: 191 LBS | HEART RATE: 64 BPM | TEMPERATURE: 97.5 F | DIASTOLIC BLOOD PRESSURE: 76 MMHG | OXYGEN SATURATION: 97 % | HEIGHT: 63 IN | RESPIRATION RATE: 12 BRPM | SYSTOLIC BLOOD PRESSURE: 113 MMHG | BODY MASS INDEX: 33.84 KG/M2

## 2020-07-21 PROBLEM — R29.6 FREQUENT FALLS: Status: ACTIVE | Noted: 2020-07-21

## 2020-07-21 PROBLEM — M47.812 CERVICAL SPONDYLOSIS: Status: ACTIVE | Noted: 2020-07-21

## 2020-07-21 PROCEDURE — 99214 OFFICE O/P EST MOD 30 MIN: CPT | Performed by: NURSE PRACTITIONER

## 2020-07-21 RX ORDER — ERENUMAB-AOOE 140 MG/ML
140 INJECTION, SOLUTION SUBCUTANEOUS
Qty: 3 PEN | Refills: 1 | Status: SHIPPED
Start: 2020-07-21 | End: 2020-07-21 | Stop reason: SDUPTHER

## 2020-07-21 RX ORDER — ERENUMAB-AOOE 70 MG/ML
70 INJECTION SUBCUTANEOUS
COMMUNITY
Start: 2020-04-28 | End: 2020-07-21 | Stop reason: DRUGHIGH

## 2020-07-21 RX ORDER — ERENUMAB-AOOE 140 MG/ML
140 INJECTION, SOLUTION SUBCUTANEOUS
Qty: 1 PEN | Refills: 0 | Status: ON HOLD | COMMUNITY
Start: 2020-07-21 | End: 2020-09-02 | Stop reason: HOSPADM

## 2020-07-21 RX ORDER — ERENUMAB-AOOE 140 MG/ML
140 INJECTION, SOLUTION SUBCUTANEOUS
Qty: 3 PEN | Refills: 1 | Status: SHIPPED
Start: 2020-07-21 | End: 2020-11-16 | Stop reason: SDUPTHER

## 2020-07-21 RX ORDER — RIZATRIPTAN BENZOATE 10 MG/1
10 TABLET ORAL DAILY PRN
Qty: 10 TABLET | Refills: 3 | Status: SHIPPED
Start: 2020-07-21 | End: 2020-12-24

## 2020-07-21 NOTE — PATIENT INSTRUCTIONS
Patient Education        Cervical Spondylosis: Care Instructions  Your Care Instructions     Cervical spondylosis is a type of arthritis of the neck. It can happen as people get older. It may be caused by bone spurs or other problems. You may have neck pain and stiffness. Sometimes the space around the spinal cord narrows. When this happens, it is called spinal stenosis. Spinal stenosis can cause pain, numbness, or weakness in the arms, legs, feet, and rear end (buttocks). It can also cause loss of bowel and bladder control. You can treat some of your symptoms with over-the-counter pain medicine. But if you have spinal stenosis with severe symptoms, you may need surgery. Follow-up care is a key part of your treatment and safety. Be sure to make and go to all appointments, and call your doctor if you are having problems. It's also a good idea to know your test results and keep a list of the medicines you take. How can you care for yourself at home? · Take anti-inflammatory medicines to reduce neck pain. These include ibuprofen (Advil, Motrin) and naproxen (Aleve). Be safe with medicines. Read and follow all instructions on the label. · Follow your doctor's recommendation about activity. He or she may tell you not to do sports or activities that could injure your neck. When should you call for help? [id number] anytime you think you may need emergency care. For example, call if:  · You are unable to move an arm or a leg at all. Call your doctor now or seek immediate medical care if:  · You have new or worse symptoms in your arms, legs, belly, or buttocks. Symptoms may include:  ? Numbness or tingling. ? Weakness. ? Pain. · You lose bladder or bowel control. Watch closely for changes in your health, and be sure to contact your doctor if:  · You do not get better as expected. Where can you learn more? Go to https://[employer]. org and sign in to your [employer] account.  Enter H784 in the notice a change. If you have trouble seeing and hearing, you might not be able to avoid objects and could lose your balance. · Know the side effects of the medicines you take. Ask your doctor or pharmacist whether the medicines you take can affect your balance. Sleeping pills or sedatives can affect your balance. · Limit the amount of alcohol you drink. Alcohol can impair your balance and other senses. · Ask your doctor whether calluses or corns on your feet need to be removed. If you wear loose-fitting shoes because of calluses or corns, you can lose your balance and fall. · Talk to your doctor if you have numbness in your feet. Preventing falls at home  · Remove raised doorway thresholds, throw rugs, and clutter. Repair loose carpet or raised areas in the floor. · Move furniture and electrical cords to keep them out of walking paths. · Use nonskid floor wax, and wipe up spills right away, especially on ceramic tile floors. · If you use a walker or cane, put rubber tips on it. If you use crutches, clean the bottoms of them regularly with an abrasive pad, such as steel wool. · Keep your house well lit, especially [place], and outside walkways. Use night-lights in areas such as hallways and bathrooms. Add extra light switches or use remote switches (such as switches that go on or off when you clap your hands) to make it easier to turn lights on if you have to get up during the night. · Install sturdy handrails on stairways. · Move items in your cabinets so that the things you use a lot are on the lower shelves (about waist level). · Keep a cordless phone and a flashlight with new batteries by your bed. If possible, put a phone in each of the main rooms of your house, or carry a cell phone in case you fall and cannot reach a phone. Or, you can wear a device around your neck or wrist. You push a button that sends a signal for help.   · Wear low-heeled shoes that fit well and give your feet good

## 2020-07-21 NOTE — PROGRESS NOTES
[address] [facility]. Nila Harden M.D., F.A.C.P. Elicia Perkins, JANE, APRN, CNS  Wilner Elena. Socorro Baig, MSN, APRN-FNP-C  Dandre Torres MSN, APRN, FNP-C  Ana ORTEZ PA-C  [address] MSN, APRN, FNP-C  [address], [facility] [place]  [place], [address]  Phone: [phone]  Fax: [phone]         Kathy Gutierrez is a 60 y.o. right handed woman    We are following her for migraines with aura and LS radiculopathy. She presents alone and remains an excellent historian. MRI of her brain ok and cervical spine showed spondylotic changes with possible myelopathy-there is no major stenosis but some impression on the cord around her C4-C6 level. She continues to suffer with balance issues, dizziness, and falls. She feels as if her legs are like cement at times. She has not gotten assistive device or attended PT as recommended, citing issues with insurance. She also had pharmacy issues regarding Aimovig 140 mg--she stated with the sample dose she had almost no migraines that month but is now back to taking 70 mg with daily headaches of varying intensity. Maxalt 5 mg is effective somewhat but she will often have to re-dose. Failed amitriptyline in the past    She still has low back pains and remains on gabapentin 400 mg 3 times daily--- repeat EDX studies of both legs were normal.    No chest pain or palpitations  No SOB  No vertigo, lightheadedness or loss of consciousness  No itching or bruising appreciated  No speech or swallowing troubles  No focal limb weakness    ROS otherwise negative    Current Outpatient Medications   Medication Sig Dispense Refill    gabapentin (NEURONTIN) 400 MG capsule Take 1 capsule by mouth 3 times daily for 90 days.  270 capsule 1    rizatriptan (MAXALT) 5 MG tablet Take 1 tablet by mouth daily as needed for Migraine May repeat dose in 2 hours if first dose ineffective; no more than 2 doses in 24 hours 10 tablet 3    escitalopram and they cause focal direct IMPRESSION on the cord with some flattening of the cord particularly the levels of C4-5 and C5-6. Where the cord is fragmented, there are some areas of hyperintensity in the cord across the midline in the level of C4-5 and particularly at C5-6 seen on the axial T2*GRE sequence but not conspicuously identified on the T2 sequence. Motion artifacts are present on the STIR sequence causing misregistration of the images and loss of detail. The possibility for spondylolytic myelopathy can be considered in the levels of C5-C6 and also at C4-5. No other areas of abnormal signal are seen in the cord in the T2*axial images. The   There are some discrete inflammatory-like changes with low signal T1 and high signal on T2 at the level of the endplates of C6 anteriorly and inferiorly and C7 superiorly and anteriorly. There is no high signal in the intervertebral disc. This can be attributed to degenerative process from the disc space. The   Encroachments of the neural foramina by uncovertebral hypertrophy is seen mainly on the left side of C4-5, left side of C5-6. No significant degenerative changes are seen in the facet joints. Odontoid process intact.      MRI brain WO May 2020: no acute events    EMG May 2020: normal    All labs and images personally reviewed today    Assessment:     Chronic migraine headaches with aura   Over 25 headache days per month on Aimovig 70 mg   Maxalt somewhat effective for rescue    Cervical spondylosis with myelopathy   Suspect contributing to falls and gait imbalance   Hyperreflexia in legs on exam again today, but no weakness    Lumbosacral radiculopathy    Repeat EDX studies normal   Pain controlled on gabapentin     Plan:     Obtain NSGY opinion for cervical spine--Dr. Ramos American    Reordered PT at [facility]    Reordered increase of Aimovig to 140 mg---one sample box given as documented     Increase Maxalt 10 mg as needed    Prescription for araseli    Fall precautions reviewed    RTO 4 months or sooner if issues     SHAISTA Lewis   7:40 AM  7/21/2020

## 2020-07-28 ENCOUNTER — OFFICE VISIT (OUTPATIENT)
Dept: NEUROSURGERY | Age: 55
End: 2020-07-28
Payer: COMMERCIAL

## 2020-07-28 VITALS
DIASTOLIC BLOOD PRESSURE: 65 MMHG | BODY MASS INDEX: 33.66 KG/M2 | WEIGHT: 190 LBS | HEIGHT: 63 IN | SYSTOLIC BLOOD PRESSURE: 111 MMHG | TEMPERATURE: 98.2 F | HEART RATE: 54 BPM

## 2020-07-28 PROCEDURE — 99203 OFFICE O/P NEW LOW 30 MIN: CPT | Performed by: PHYSICIAN ASSISTANT

## 2020-07-28 ASSESSMENT — ENCOUNTER SYMPTOMS
GASTROINTESTINAL NEGATIVE: 1
EYES NEGATIVE: 1
ALLERGIC/IMMUNOLOGIC NEGATIVE: 1
RESPIRATORY NEGATIVE: 1

## 2020-07-28 NOTE — PROGRESS NOTES
Subjective:      Patient ID: Bernardo Lyles is a 60 y.o. female. Neck Pain    This is a new problem. Episode onset: 3 months. The problem occurs constantly. The problem has been gradually improving. The pain is associated with a fall. The pain is present in the midline (and bilateral arm pain into the hands. ). The quality of the pain is described as aching. The pain is moderate. Associated symptoms comments: Bilateral hand numbness, weakness, loss of hand dexterity. Ambulatory dysfunction, uses a cane. . She has tried muscle relaxants (physical therapy, gabapentin) for the symptoms. The treatment provided mild relief. Review of Systems   Constitutional: Negative. HENT: Negative. Eyes: Negative. Respiratory: Negative. Cardiovascular: Negative. Gastrointestinal: Negative. Endocrine: Negative. Genitourinary: Negative. Musculoskeletal: Negative. Skin: Negative. Allergic/Immunologic: Negative. Neurological: Negative. Hematological: Negative. Psychiatric/Behavioral: Negative. Objective:   Physical Exam  Constitutional:       Appearance: Normal appearance. HENT:      Head: Normocephalic and atraumatic. Nose: Nose normal.   Eyes:      Pupils: Pupils are equal, round, and reactive to light. Pulmonary:      Effort: Pulmonary effort is normal.   Abdominal:      General: There is no distension. Musculoskeletal: Normal range of motion. Skin:     General: Skin is warm. Neurological:      Mental Status: She is alert. GCS: GCS eye subscore is 4. GCS verbal subscore is 5. GCS motor subscore is 6. Cranial Nerves: Cranial nerves are intact. Sensory: Sensation is intact. Motor: Motor function is intact. Gait: Gait abnormal.      Deep Tendon Reflexes: Reflexes are normal and symmetric. Babinski sign absent on the right side. Babinski sign absent on the left side.       Comments: No hoffmans   Psychiatric:         Mood and Affect: Mood normal.         Assessment:      60year old with 3 month history of neck and bilateral arm pain after a fall. She has had ambulatory dysfunction and loss of hand dexterity as well. Cervical MRI reveals C4-5, C5-6, and C6-7 disc herniations with significant stenosis and some cord signal at C5-6. Plan:      The patient would like to proceed with a cervical decompression and fusion due to her neurological decline. Dr. Yao Almanza discussed the risks, benefits and the procedure to the patients satisfaction. ROBBIE Vizcarra     I have interviewed and examined the patient and she has myelopathy with loss of dexterity and gait abnormalities and balance problems. Her MRI shows moderate to severe stenosis from C4-C7.   I am recommending a C4-C5, C5-C6 and C6-C7 anterior cervical diskectomy and fusion    Lukas Maldonado

## 2020-08-06 ENCOUNTER — PREP FOR PROCEDURE (OUTPATIENT)
Dept: NEUROSURGERY | Age: 55
End: 2020-08-06

## 2020-08-06 RX ORDER — SODIUM CHLORIDE 0.9 % (FLUSH) 0.9 %
10 SYRINGE (ML) INJECTION EVERY 12 HOURS SCHEDULED
Status: CANCELLED | OUTPATIENT
Start: 2020-08-06

## 2020-08-06 RX ORDER — SODIUM CHLORIDE 0.9 % (FLUSH) 0.9 %
10 SYRINGE (ML) INJECTION PRN
Status: CANCELLED | OUTPATIENT
Start: 2020-08-06

## 2020-08-06 RX ORDER — SODIUM CHLORIDE 9 MG/ML
INJECTION, SOLUTION INTRAVENOUS CONTINUOUS
Status: CANCELLED | OUTPATIENT
Start: 2020-08-06

## 2020-08-13 NOTE — PROGRESS NOTES
Patient having covid testing at [address] [place] [address] 8/19/20. Patient asked to bring ID and to self quarantine until day of procedure.

## 2020-08-17 NOTE — PROGRESS NOTES
[facility] PRE-ADMISSION TESTING GENERAL INSTRUCTIONS- [facility]-phone number:[phone]    GENERAL INSTRUCTIONS  [x] Antibacterial Soap shower Night before of Surgery  [x] Nestor wipe instruction sheet and wipes given. [x] Nothing by mouth after midnight, including gum, candy, mints, or water. [x] You may brush your teeth, gargle, but do NOT swallow water. [x]No smoking, chewing tobacco, illegal drugs, or alcohol within 24 hours of your surgery. [x] Jewelry, valuables or body piercing's should not be brought to the hospital. All body and/or tongue piercing's must be removed prior to arriving to hospital.  ALL hair pins must be removed. [x] Do not wear makeup, lotions, powders, deodorant. Nail polish as directed by the nurse. [x] Arrange transportation with a responsible adult  to and from the hospital. If you do not have a responsible adult  to transport you, you will need to make arrangements with a medical transportation company (i.e. [employer]. A Uber/taxi/bus is not appropriate unless you are accompanied by a responsible adult ). Arrange for someone to be with you for the remainder of the day and for 24 hours after your procedure due to having had anesthesia. Who will be your  for transportation? Who will be staying with you for 24 hrs after your procedure? [x] Bring insurance card and photo ID. [x] Transfusion Bracelet: Please bring with you to hospital, day of surgery  [x] Use inhalers the morning of surgery and bring with you to hospital.  . PARKING INSTRUCTIONS:   [x] Arrival Time: [address]TRANCE, [address], MUST WEAR MASK         [x] To reach the [place] from [address], upon entering the hospital, take elevator B to the 3rd floor. EDUCATION INSTRUCTIONS:         [x] Pre-admission Testing educational folder given  [x] Incentive Spirometry,coughing & deep breathing exercises reviewed.      [x]Medication information sheet(s)   [x]Fluoroscopy-Xray used in surgery reviewed with patient. Educational pamphlet placed in chart. [x]Pain: Post-op pain is normal and to be expected. You will be asked to rate your pain from 0-10(a zero is not acceptable-education is needed). Your post-op pain goal is:5  [x] Ask your nurse for your pain medication. MEDICATION INSTRUCTIONS:   [x]Bring a complete list of your medications, please write the last time you took the medicine, give this list to the nurse. [x] Take the following medications the morning of surgery with 1-2 ounces of water: SEE LIST  [x] Stop herbal supplements and vitamins 5 days before your surgery. [x] DO NOT take any diabetic medicine the morning of surgery. Follow instructions for insulin the day before surgery. [x] If you are diabetic and your blood sugar is low or you feel symptomatic, you may drink 1-2 ounces of apple juice or take a glucose tablet. The morning of your procedure, you may call the pre-op area if you have concerns about your blood sugar [phone]. [x] Use your inhalers the morning of surgery. Bring your emergency inhaler with you day of surgery. [] Follow physician instructions regarding any blood thinners you may be taking. WHAT TO EXPECT:   [x]  Delays may occur with surgery and staff will make a sincere effort to keep you informed of delays. If any delays occur with your procedure, we apologize ahead of time for your inconvenience as we recognize the value of your time.

## 2020-08-18 ENCOUNTER — HOSPITAL ENCOUNTER (OUTPATIENT)
Dept: PREADMISSION TESTING | Age: 55
Discharge: HOME | End: 2020-08-18
Payer: COMMERCIAL

## 2020-08-18 LAB
ABO/RH: NORMAL
ANION GAP SERPL CALCULATED.3IONS-SCNC: 12 MMOL/L (ref 7–16)
ANTIBODY SCREEN: NORMAL
BASOPHILS ABSOLUTE: 0.08 E9/L (ref 0–0.2)
BASOPHILS RELATIVE PERCENT: 1 % (ref 0–2)
BILIRUBIN URINE: NEGATIVE
BLOOD, URINE: NEGATIVE
BUN BLDV-MCNC: 15 MG/DL (ref 6–20)
CALCIUM SERPL-MCNC: 9.8 MG/DL (ref 8.6–10.2)
CHLORIDE BLD-SCNC: 104 MMOL/L (ref 98–107)
CLARITY: CLEAR
CO2: 27 MMOL/L (ref 22–29)
COLOR: YELLOW
CREAT SERPL-MCNC: 0.9 MG/DL (ref 0.5–1)
EOSINOPHILS ABSOLUTE: 0.29 E9/L (ref 0.05–0.5)
EOSINOPHILS RELATIVE PERCENT: 3.6 % (ref 0–6)
GFR AFRICAN AMERICAN: >60
GFR NON-AFRICAN AMERICAN: >60 ML/MIN/1.73
GLUCOSE BLD-MCNC: 174 MG/DL (ref 74–99)
GLUCOSE URINE: NEGATIVE MG/DL
HCT VFR BLD CALC: 40.7 % (ref 34–48)
HEMOGLOBIN: 13.3 G/DL (ref 11.5–15.5)
IMMATURE GRANULOCYTES #: 0.03 E9/L
IMMATURE GRANULOCYTES %: 0.4 % (ref 0–5)
INR BLD: 1
KETONES, URINE: NEGATIVE MG/DL
LEUKOCYTE ESTERASE, URINE: NEGATIVE
LYMPHOCYTES ABSOLUTE: 1.49 E9/L (ref 1.5–4)
LYMPHOCYTES RELATIVE PERCENT: 18.3 % (ref 20–42)
MCH RBC QN AUTO: 29.2 PG (ref 26–35)
MCHC RBC AUTO-ENTMCNC: 32.7 % (ref 32–34.5)
MCV RBC AUTO: 89.3 FL (ref 80–99.9)
MONOCYTES ABSOLUTE: 0.78 E9/L (ref 0.1–0.95)
MONOCYTES RELATIVE PERCENT: 9.6 % (ref 2–12)
NEUTROPHILS ABSOLUTE: 5.45 E9/L (ref 1.8–7.3)
NEUTROPHILS RELATIVE PERCENT: 67.1 % (ref 43–80)
NITRITE, URINE: NEGATIVE
PDW BLD-RTO: 13.2 FL (ref 11.5–15)
PH UA: 7 (ref 5–9)
PLATELET # BLD: 216 E9/L (ref 130–450)
PMV BLD AUTO: 10.8 FL (ref 7–12)
POTASSIUM REFLEX MAGNESIUM: 3.7 MMOL/L (ref 3.5–5)
PROTEIN UA: NEGATIVE MG/DL
PROTHROMBIN TIME: 10.7 SEC (ref 9.3–12.4)
RBC # BLD: 4.56 E12/L (ref 3.5–5.5)
SODIUM BLD-SCNC: 143 MMOL/L (ref 132–146)
SPECIFIC GRAVITY UA: 1.01 (ref 1–1.03)
UROBILINOGEN, URINE: 0.2 E.U./DL
WBC # BLD: 8.1 E9/L (ref 4.5–11.5)

## 2020-08-18 PROCEDURE — 86900 BLOOD TYPING SEROLOGIC ABO: CPT

## 2020-08-18 PROCEDURE — 36415 COLL VENOUS BLD VENIPUNCTURE: CPT

## 2020-08-18 PROCEDURE — 87088 URINE BACTERIA CULTURE: CPT

## 2020-08-18 PROCEDURE — 85610 PROTHROMBIN TIME: CPT

## 2020-08-18 PROCEDURE — 80048 BASIC METABOLIC PNL TOTAL CA: CPT

## 2020-08-18 PROCEDURE — 87081 CULTURE SCREEN ONLY: CPT

## 2020-08-18 PROCEDURE — 86850 RBC ANTIBODY SCREEN: CPT

## 2020-08-18 PROCEDURE — 85025 COMPLETE CBC W/AUTO DIFF WBC: CPT

## 2020-08-18 PROCEDURE — 81003 URINALYSIS AUTO W/O SCOPE: CPT

## 2020-08-18 PROCEDURE — 86901 BLOOD TYPING SEROLOGIC RH(D): CPT

## 2020-08-18 RX ORDER — ASPIRIN 81 MG/1
81 TABLET ORAL DAILY
Status: ON HOLD | COMMUNITY
End: 2020-08-27 | Stop reason: HOSPADM

## 2020-08-18 NOTE — PROGRESS NOTES
Saw pt in PAT, reviewed:    Surgical Procedure-reviewed procedure and post-op events:pre-op/PACU, Unit admission, PT/OT evaluation, Discharge [address] Stay expectations-reviewed importance of early mobilization. Instructions of Spine Precautions given-PT to review on evaluation. Surgical Pathway Booklet-reviewed and given  Post-op/Discharge Instructions-reviewed activity allowances/restrictions  Use of Incentive Spirometer-instructed on importance of use post-op and continued use @ home to prevent complications. Instructions on use given  Setting realistic pain goals-reaching goal before discharge. ORTHOTICS: as per Dr doyle. **Reviewed Cervical Fusion Discharge Instructions    Discharge Plans- home with self/family care. Receptive to [facility] [address] if recommended    Verbalized understanding of all instructions and questions answered. Contact number given.     Mounika Dunham RN, Spine Navigator  [phone] Office  [phone] Cell

## 2020-08-19 LAB — MRSA CULTURE ONLY: NORMAL

## 2020-08-20 LAB — URINE CULTURE, ROUTINE: NORMAL

## 2020-08-21 ENCOUNTER — HOSPITAL ENCOUNTER (OUTPATIENT)
Age: 55
Discharge: HOME | End: 2020-08-23
Payer: COMMERCIAL

## 2020-08-21 PROCEDURE — U0003 INFECTIOUS AGENT DETECTION BY NUCLEIC ACID (DNA OR RNA); SEVERE ACUTE RESPIRATORY SYNDROME CORONAVIRUS 2 (SARS-COV-2) (CORONAVIRUS DISEASE [COVID-19]), AMPLIFIED PROBE TECHNIQUE, MAKING USE OF HIGH THROUGHPUT TECHNOLOGIES AS DESCRIBED BY CMS-2020-01-R: HCPCS

## 2020-08-21 NOTE — H&P
Neck Pain    This is a new problem. Episode onset: 3 months. The problem occurs constantly. The problem has been gradually improving. The pain is associated with a fall. The pain is present in the midline (and bilateral arm pain into the hands. ). The quality of the pain is described as aching. The pain is moderate. Associated symptoms comments: Bilateral hand numbness, weakness, loss of hand dexterity.       Ambulatory dysfunction, uses a cane.     . She has tried muscle relaxants (physical therapy, gabapentin) for the symptoms. The treatment provided mild relief.         Review of Systems   Constitutional: Negative. HENT: Negative. Eyes: Negative. Respiratory: Negative. Cardiovascular: Negative. Gastrointestinal: Negative. Endocrine: Negative. Genitourinary: Negative. Musculoskeletal: Negative. Skin: Negative. Allergic/Immunologic: Negative. Neurological: Negative. Hematological: Negative. Psychiatric/Behavioral: Negative.          Objective:   Physical Exam  Constitutional:       Appearance: Normal appearance. HENT:      Head: Normocephalic and atraumatic. Nose: Nose normal.   Eyes:      Pupils: Pupils are equal, round, and reactive to light. Pulmonary:      Effort: Pulmonary effort is normal.   Abdominal:      General: There is no distension. Musculoskeletal: Normal range of motion. Skin:     General: Skin is warm. Neurological:      Mental Status: She is alert. GCS: GCS eye subscore is 4. GCS verbal subscore is 5. GCS motor subscore is 6. Cranial Nerves: Cranial nerves are intact. Sensory: Sensation is intact. Motor: Motor function is intact. Gait: Gait abnormal.      Deep Tendon Reflexes: Reflexes are normal and symmetric. Babinski sign absent on the right side. Babinski sign absent on the left side.       Comments: No hoffmans   Psychiatric:         Mood and Affect: Mood normal.            Assessment:      60year old with 3 month

## 2020-08-24 ENCOUNTER — ANESTHESIA (OUTPATIENT)
Dept: OPERATING ROOM | Age: 55
End: 2020-08-24
Payer: COMMERCIAL

## 2020-08-24 ENCOUNTER — ANESTHESIA EVENT (OUTPATIENT)
Dept: OPERATING ROOM | Age: 55
End: 2020-08-24
Payer: COMMERCIAL

## 2020-08-24 ENCOUNTER — HOSPITAL ENCOUNTER (OUTPATIENT)
Age: 55
Setting detail: OBSERVATION
Discharge: NURSING, REHAB OR LONG-TERM CARE | End: 2020-08-28
Attending: NEUROLOGICAL SURGERY | Admitting: NEUROLOGICAL SURGERY
Payer: COMMERCIAL

## 2020-08-24 ENCOUNTER — APPOINTMENT (OUTPATIENT)
Dept: GENERAL RADIOLOGY | Age: 55
End: 2020-08-24
Attending: NEUROLOGICAL SURGERY
Payer: COMMERCIAL

## 2020-08-24 VITALS — DIASTOLIC BLOOD PRESSURE: 83 MMHG | SYSTOLIC BLOOD PRESSURE: 134 MMHG | TEMPERATURE: 97.5 F | OXYGEN SATURATION: 95 %

## 2020-08-24 PROBLEM — M50.20 CERVICAL HERNIATED DISC: Status: ACTIVE | Noted: 2020-08-24

## 2020-08-24 LAB
METER GLUCOSE: 140 MG/DL (ref 74–99)
SARS-COV-2: NOT DETECTED
SOURCE: NORMAL

## 2020-08-24 PROCEDURE — 20931 SP BONE ALGRFT STRUCT ADD-ON: CPT | Performed by: NEUROLOGICAL SURGERY

## 2020-08-24 PROCEDURE — 22846 INSERT SPINE FIXATION DEVICE: CPT | Performed by: NEUROLOGICAL SURGERY

## 2020-08-24 PROCEDURE — 7100000001 HC PACU RECOVERY - ADDTL 15 MIN: Performed by: NEUROLOGICAL SURGERY

## 2020-08-24 PROCEDURE — 3209999900 FLUORO FOR SURGICAL PROCEDURES

## 2020-08-24 PROCEDURE — 22551 ARTHRD ANT NTRBDY CERVICAL: CPT | Performed by: PHYSICIAN ASSISTANT

## 2020-08-24 PROCEDURE — 2580000003 HC RX 258: Performed by: NEUROLOGICAL SURGERY

## 2020-08-24 PROCEDURE — C1713 ANCHOR/SCREW BN/BN,TIS/BN: HCPCS | Performed by: NEUROLOGICAL SURGERY

## 2020-08-24 PROCEDURE — 3600000004 HC SURGERY LEVEL 4 BASE: Performed by: NEUROLOGICAL SURGERY

## 2020-08-24 PROCEDURE — 22552 ARTHRD ANT NTRBD CERVICAL EA: CPT | Performed by: PHYSICIAN ASSISTANT

## 2020-08-24 PROCEDURE — 6370000000 HC RX 637 (ALT 250 FOR IP): Performed by: NEUROLOGICAL SURGERY

## 2020-08-24 PROCEDURE — 2580000003 HC RX 258: Performed by: PHYSICIAN ASSISTANT

## 2020-08-24 PROCEDURE — 6360000002 HC RX W HCPCS: Performed by: PHYSICIAN ASSISTANT

## 2020-08-24 PROCEDURE — G0378 HOSPITAL OBSERVATION PER HR: HCPCS

## 2020-08-24 PROCEDURE — 3700000001 HC ADD 15 MINUTES (ANESTHESIA): Performed by: NEUROLOGICAL SURGERY

## 2020-08-24 PROCEDURE — 2720000010 HC SURG SUPPLY STERILE: Performed by: NEUROLOGICAL SURGERY

## 2020-08-24 PROCEDURE — 88304 TISSUE EXAM BY PATHOLOGIST: CPT

## 2020-08-24 PROCEDURE — 2500000003 HC RX 250 WO HCPCS: Performed by: NEUROLOGICAL SURGERY

## 2020-08-24 PROCEDURE — 2500000003 HC RX 250 WO HCPCS: Performed by: NURSE ANESTHETIST, CERTIFIED REGISTERED

## 2020-08-24 PROCEDURE — 22846 INSERT SPINE FIXATION DEVICE: CPT | Performed by: PHYSICIAN ASSISTANT

## 2020-08-24 PROCEDURE — 22552 ARTHRD ANT NTRBD CERVICAL EA: CPT | Performed by: NEUROLOGICAL SURGERY

## 2020-08-24 PROCEDURE — 2709999900 HC NON-CHARGEABLE SUPPLY: Performed by: NEUROLOGICAL SURGERY

## 2020-08-24 PROCEDURE — 7100000000 HC PACU RECOVERY - FIRST 15 MIN: Performed by: NEUROLOGICAL SURGERY

## 2020-08-24 PROCEDURE — 6360000002 HC RX W HCPCS: Performed by: ANESTHESIOLOGY

## 2020-08-24 PROCEDURE — 6360000002 HC RX W HCPCS: Performed by: NURSE ANESTHETIST, CERTIFIED REGISTERED

## 2020-08-24 PROCEDURE — 3600000014 HC SURGERY LEVEL 4 ADDTL 15MIN: Performed by: NEUROLOGICAL SURGERY

## 2020-08-24 PROCEDURE — 3700000000 HC ANESTHESIA ATTENDED CARE: Performed by: NEUROLOGICAL SURGERY

## 2020-08-24 PROCEDURE — 2780000010 HC IMPLANT OTHER: Performed by: NEUROLOGICAL SURGERY

## 2020-08-24 PROCEDURE — 82962 GLUCOSE BLOOD TEST: CPT

## 2020-08-24 PROCEDURE — 22551 ARTHRD ANT NTRBDY CERVICAL: CPT | Performed by: NEUROLOGICAL SURGERY

## 2020-08-24 PROCEDURE — 6360000002 HC RX W HCPCS: Performed by: NEUROLOGICAL SURGERY

## 2020-08-24 DEVICE — XTEND ANTERIOR CERVICAL PLATE, 3-LEVEL, 48MM
Type: IMPLANTABLE DEVICE | Site: NECK | Status: FUNCTIONAL
Brand: XTEND

## 2020-08-24 DEVICE — GRAFT BNE SUB W12XH7XL14MM CANC CORT ANT CERV INTBDY FUS: Type: IMPLANTABLE DEVICE | Site: NECK | Status: FUNCTIONAL

## 2020-08-24 DEVICE — XTEND 4.2MM VARIABLE ANGLE SCREW, SELF-DRILLING, 14MM
Type: IMPLANTABLE DEVICE | Site: NECK | Status: FUNCTIONAL
Brand: XTEND

## 2020-08-24 DEVICE — IMPLANTABLE DEVICE: Type: IMPLANTABLE DEVICE | Site: NECK | Status: FUNCTIONAL

## 2020-08-24 DEVICE — XTEND 4.2MM VARIABLE ANGLE SCREW, SELF-DRILLING, 16MM
Type: IMPLANTABLE DEVICE | Site: NECK | Status: FUNCTIONAL
Brand: XTEND

## 2020-08-24 RX ORDER — ROCURONIUM BROMIDE 10 MG/ML
INJECTION, SOLUTION INTRAVENOUS PRN
Status: DISCONTINUED | OUTPATIENT
Start: 2020-08-24 | End: 2020-08-24 | Stop reason: SDUPTHER

## 2020-08-24 RX ORDER — LOSARTAN POTASSIUM 50 MG/1
50 TABLET ORAL DAILY
Status: DISCONTINUED | OUTPATIENT
Start: 2020-08-25 | End: 2020-08-28 | Stop reason: HOSPADM

## 2020-08-24 RX ORDER — DEXTROSE MONOHYDRATE 50 MG/ML
100 INJECTION, SOLUTION INTRAVENOUS PRN
Status: DISCONTINUED | OUTPATIENT
Start: 2020-08-24 | End: 2020-08-28 | Stop reason: HOSPADM

## 2020-08-24 RX ORDER — BUTALBITAL, ACETAMINOPHEN AND CAFFEINE 50; 325; 40 MG/1; MG/1; MG/1
1 TABLET ORAL EVERY 4 HOURS PRN
Status: DISCONTINUED | OUTPATIENT
Start: 2020-08-24 | End: 2020-08-28 | Stop reason: HOSPADM

## 2020-08-24 RX ORDER — OXYCODONE HYDROCHLORIDE 10 MG/1
10 TABLET ORAL EVERY 4 HOURS PRN
Status: DISCONTINUED | OUTPATIENT
Start: 2020-08-24 | End: 2020-08-26

## 2020-08-24 RX ORDER — MORPHINE SULFATE 4 MG/ML
4 INJECTION, SOLUTION INTRAMUSCULAR; INTRAVENOUS
Status: DISCONTINUED | OUTPATIENT
Start: 2020-08-24 | End: 2020-08-28 | Stop reason: HOSPADM

## 2020-08-24 RX ORDER — ATORVASTATIN CALCIUM 40 MG/1
40 TABLET, FILM COATED ORAL DAILY
Status: DISCONTINUED | OUTPATIENT
Start: 2020-08-25 | End: 2020-08-28 | Stop reason: HOSPADM

## 2020-08-24 RX ORDER — LIDOCAINE HYDROCHLORIDE 20 MG/ML
INJECTION, SOLUTION INTRAVENOUS PRN
Status: DISCONTINUED | OUTPATIENT
Start: 2020-08-24 | End: 2020-08-24 | Stop reason: SDUPTHER

## 2020-08-24 RX ORDER — SODIUM PHOSPHATE, DIBASIC AND SODIUM PHOSPHATE, MONOBASIC 7; 19 G/133ML; G/133ML
1 ENEMA RECTAL DAILY PRN
Status: DISCONTINUED | OUTPATIENT
Start: 2020-08-24 | End: 2020-08-28 | Stop reason: HOSPADM

## 2020-08-24 RX ORDER — SODIUM CHLORIDE 0.9 % (FLUSH) 0.9 %
10 SYRINGE (ML) INJECTION EVERY 12 HOURS SCHEDULED
Status: DISCONTINUED | OUTPATIENT
Start: 2020-08-24 | End: 2020-08-24 | Stop reason: HOSPADM

## 2020-08-24 RX ORDER — SODIUM CHLORIDE 0.9 % (FLUSH) 0.9 %
10 SYRINGE (ML) INJECTION EVERY 12 HOURS SCHEDULED
Status: DISCONTINUED | OUTPATIENT
Start: 2020-08-24 | End: 2020-08-28 | Stop reason: HOSPADM

## 2020-08-24 RX ORDER — DEXAMETHASONE SODIUM PHOSPHATE 10 MG/ML
INJECTION, SOLUTION INTRAMUSCULAR; INTRAVENOUS PRN
Status: DISCONTINUED | OUTPATIENT
Start: 2020-08-24 | End: 2020-08-24 | Stop reason: SDUPTHER

## 2020-08-24 RX ORDER — BUMETANIDE 1 MG/1
2 TABLET ORAL 2 TIMES DAILY
Status: DISCONTINUED | OUTPATIENT
Start: 2020-08-25 | End: 2020-08-28 | Stop reason: HOSPADM

## 2020-08-24 RX ORDER — SODIUM CHLORIDE 0.9 % (FLUSH) 0.9 %
10 SYRINGE (ML) INJECTION PRN
Status: DISCONTINUED | OUTPATIENT
Start: 2020-08-24 | End: 2020-08-24 | Stop reason: HOSPADM

## 2020-08-24 RX ORDER — POLYETHYLENE GLYCOL 3350 17 G/17G
17 POWDER, FOR SOLUTION ORAL DAILY
Status: DISCONTINUED | OUTPATIENT
Start: 2020-08-25 | End: 2020-08-28 | Stop reason: HOSPADM

## 2020-08-24 RX ORDER — SUMATRIPTAN 50 MG/1
100 TABLET, FILM COATED ORAL DAILY PRN
Status: DISCONTINUED | OUTPATIENT
Start: 2020-08-24 | End: 2020-08-28 | Stop reason: HOSPADM

## 2020-08-24 RX ORDER — SODIUM CHLORIDE 0.9 % (FLUSH) 0.9 %
10 SYRINGE (ML) INJECTION PRN
Status: DISCONTINUED | OUTPATIENT
Start: 2020-08-24 | End: 2020-08-28 | Stop reason: HOSPADM

## 2020-08-24 RX ORDER — ESCITALOPRAM OXALATE 10 MG/1
20 TABLET ORAL DAILY
Status: DISCONTINUED | OUTPATIENT
Start: 2020-08-25 | End: 2020-08-28 | Stop reason: HOSPADM

## 2020-08-24 RX ORDER — OXYCODONE HYDROCHLORIDE AND ACETAMINOPHEN 5; 325 MG/1; MG/1
2 TABLET ORAL PRN
Status: DISCONTINUED | OUTPATIENT
Start: 2020-08-24 | End: 2020-08-24 | Stop reason: HOSPADM

## 2020-08-24 RX ORDER — FENTANYL CITRATE 50 UG/ML
INJECTION, SOLUTION INTRAMUSCULAR; INTRAVENOUS PRN
Status: DISCONTINUED | OUTPATIENT
Start: 2020-08-24 | End: 2020-08-24 | Stop reason: SDUPTHER

## 2020-08-24 RX ORDER — PROPOFOL 10 MG/ML
INJECTION, EMULSION INTRAVENOUS PRN
Status: DISCONTINUED | OUTPATIENT
Start: 2020-08-24 | End: 2020-08-24 | Stop reason: SDUPTHER

## 2020-08-24 RX ORDER — SODIUM CHLORIDE 9 MG/ML
INJECTION, SOLUTION INTRAVENOUS CONTINUOUS
Status: DISCONTINUED | OUTPATIENT
Start: 2020-08-24 | End: 2020-08-28 | Stop reason: HOSPADM

## 2020-08-24 RX ORDER — NICOTINE POLACRILEX 4 MG
15 LOZENGE BUCCAL PRN
Status: DISCONTINUED | OUTPATIENT
Start: 2020-08-24 | End: 2020-08-28 | Stop reason: HOSPADM

## 2020-08-24 RX ORDER — EPHEDRINE SULFATE/0.9% NACL/PF 50 MG/5 ML
SYRINGE (ML) INTRAVENOUS PRN
Status: DISCONTINUED | OUTPATIENT
Start: 2020-08-24 | End: 2020-08-24 | Stop reason: SDUPTHER

## 2020-08-24 RX ORDER — BUMETANIDE 1 MG/1
1 TABLET ORAL 2 TIMES DAILY
Status: DISCONTINUED | OUTPATIENT
Start: 2020-08-24 | End: 2020-08-24

## 2020-08-24 RX ORDER — ONDANSETRON 2 MG/ML
4 INJECTION INTRAMUSCULAR; INTRAVENOUS EVERY 6 HOURS PRN
Status: DISCONTINUED | OUTPATIENT
Start: 2020-08-24 | End: 2020-08-28 | Stop reason: HOSPADM

## 2020-08-24 RX ORDER — CYCLOBENZAPRINE HCL 10 MG
10 TABLET ORAL 3 TIMES DAILY PRN
Status: DISCONTINUED | OUTPATIENT
Start: 2020-08-24 | End: 2020-08-28 | Stop reason: HOSPADM

## 2020-08-24 RX ORDER — DILTIAZEM HYDROCHLORIDE 120 MG/1
120 CAPSULE, COATED, EXTENDED RELEASE ORAL DAILY
Status: DISCONTINUED | OUTPATIENT
Start: 2020-08-25 | End: 2020-08-28 | Stop reason: HOSPADM

## 2020-08-24 RX ORDER — VANCOMYCIN HYDROCHLORIDE 500 MG/10ML
INJECTION, POWDER, LYOPHILIZED, FOR SOLUTION INTRAVENOUS PRN
Status: DISCONTINUED | OUTPATIENT
Start: 2020-08-24 | End: 2020-08-24 | Stop reason: ALTCHOICE

## 2020-08-24 RX ORDER — MORPHINE SULFATE 2 MG/ML
1 INJECTION, SOLUTION INTRAMUSCULAR; INTRAVENOUS EVERY 5 MIN PRN
Status: DISCONTINUED | OUTPATIENT
Start: 2020-08-24 | End: 2020-08-24 | Stop reason: HOSPADM

## 2020-08-24 RX ORDER — OXYCODONE HYDROCHLORIDE AND ACETAMINOPHEN 5; 325 MG/1; MG/1
1 TABLET ORAL PRN
Status: DISCONTINUED | OUTPATIENT
Start: 2020-08-24 | End: 2020-08-24 | Stop reason: HOSPADM

## 2020-08-24 RX ORDER — SENNA AND DOCUSATE SODIUM 50; 8.6 MG/1; MG/1
1 TABLET, FILM COATED ORAL 2 TIMES DAILY
Status: DISCONTINUED | OUTPATIENT
Start: 2020-08-24 | End: 2020-08-28 | Stop reason: HOSPADM

## 2020-08-24 RX ORDER — MIDAZOLAM HYDROCHLORIDE 1 MG/ML
INJECTION INTRAMUSCULAR; INTRAVENOUS PRN
Status: DISCONTINUED | OUTPATIENT
Start: 2020-08-24 | End: 2020-08-24 | Stop reason: SDUPTHER

## 2020-08-24 RX ORDER — POTASSIUM CHLORIDE 20 MEQ/1
20 TABLET, EXTENDED RELEASE ORAL DAILY
Status: DISCONTINUED | OUTPATIENT
Start: 2020-08-25 | End: 2020-08-28 | Stop reason: HOSPADM

## 2020-08-24 RX ORDER — OXYCODONE HYDROCHLORIDE 5 MG/1
5 TABLET ORAL EVERY 4 HOURS PRN
Status: DISCONTINUED | OUTPATIENT
Start: 2020-08-24 | End: 2020-08-26

## 2020-08-24 RX ORDER — MEPERIDINE HYDROCHLORIDE 25 MG/ML
12.5 INJECTION INTRAMUSCULAR; INTRAVENOUS; SUBCUTANEOUS
Status: DISCONTINUED | OUTPATIENT
Start: 2020-08-24 | End: 2020-08-24 | Stop reason: HOSPADM

## 2020-08-24 RX ORDER — ONDANSETRON 2 MG/ML
INJECTION INTRAMUSCULAR; INTRAVENOUS PRN
Status: DISCONTINUED | OUTPATIENT
Start: 2020-08-24 | End: 2020-08-24 | Stop reason: SDUPTHER

## 2020-08-24 RX ORDER — MORPHINE SULFATE 2 MG/ML
2 INJECTION, SOLUTION INTRAMUSCULAR; INTRAVENOUS EVERY 5 MIN PRN
Status: DISCONTINUED | OUTPATIENT
Start: 2020-08-24 | End: 2020-08-24 | Stop reason: HOSPADM

## 2020-08-24 RX ORDER — ONDANSETRON 2 MG/ML
4 INJECTION INTRAMUSCULAR; INTRAVENOUS
Status: COMPLETED | OUTPATIENT
Start: 2020-08-24 | End: 2020-08-24

## 2020-08-24 RX ORDER — SODIUM CHLORIDE 9 MG/ML
INJECTION, SOLUTION INTRAVENOUS CONTINUOUS
Status: DISCONTINUED | OUTPATIENT
Start: 2020-08-24 | End: 2020-08-24

## 2020-08-24 RX ORDER — DEXTROSE MONOHYDRATE 25 G/50ML
12.5 INJECTION, SOLUTION INTRAVENOUS PRN
Status: DISCONTINUED | OUTPATIENT
Start: 2020-08-24 | End: 2020-08-28 | Stop reason: HOSPADM

## 2020-08-24 RX ORDER — ALBUTEROL SULFATE 90 UG/1
1 AEROSOL, METERED RESPIRATORY (INHALATION) EVERY 4 HOURS PRN
Status: DISCONTINUED | OUTPATIENT
Start: 2020-08-24 | End: 2020-08-28 | Stop reason: HOSPADM

## 2020-08-24 RX ORDER — PROMETHAZINE HYDROCHLORIDE 25 MG/1
12.5 TABLET ORAL EVERY 6 HOURS PRN
Status: DISCONTINUED | OUTPATIENT
Start: 2020-08-24 | End: 2020-08-28 | Stop reason: HOSPADM

## 2020-08-24 RX ORDER — GABAPENTIN 400 MG/1
400 CAPSULE ORAL 3 TIMES DAILY
Status: DISCONTINUED | OUTPATIENT
Start: 2020-08-24 | End: 2020-08-28 | Stop reason: HOSPADM

## 2020-08-24 RX ORDER — MESALAMINE 400 MG/1
800 CAPSULE, DELAYED RELEASE ORAL 3 TIMES DAILY
Status: DISCONTINUED | OUTPATIENT
Start: 2020-08-24 | End: 2020-08-28 | Stop reason: HOSPADM

## 2020-08-24 RX ORDER — MORPHINE SULFATE 2 MG/ML
2 INJECTION, SOLUTION INTRAMUSCULAR; INTRAVENOUS
Status: DISCONTINUED | OUTPATIENT
Start: 2020-08-24 | End: 2020-08-28 | Stop reason: HOSPADM

## 2020-08-24 RX ADMIN — PROPOFOL 150 MG: 10 INJECTION, EMULSION INTRAVENOUS at 16:40

## 2020-08-24 RX ADMIN — CYCLOBENZAPRINE 10 MG: 10 TABLET, FILM COATED ORAL at 22:59

## 2020-08-24 RX ADMIN — OXYCODONE HYDROCHLORIDE 10 MG: 10 TABLET ORAL at 22:59

## 2020-08-24 RX ADMIN — SODIUM CHLORIDE: 9 INJECTION, SOLUTION INTRAVENOUS at 17:14

## 2020-08-24 RX ADMIN — FENTANYL CITRATE 50 MCG: 50 INJECTION, SOLUTION INTRAMUSCULAR; INTRAVENOUS at 16:40

## 2020-08-24 RX ADMIN — SODIUM CHLORIDE: 9 INJECTION, SOLUTION INTRAVENOUS at 19:37

## 2020-08-24 RX ADMIN — MIDAZOLAM 2 MG: 1 INJECTION INTRAMUSCULAR; INTRAVENOUS at 16:29

## 2020-08-24 RX ADMIN — Medication 1.5 G: at 16:46

## 2020-08-24 RX ADMIN — HYDROMORPHONE HYDROCHLORIDE 0.5 MG: 1 INJECTION, SOLUTION INTRAMUSCULAR; INTRAVENOUS; SUBCUTANEOUS at 20:28

## 2020-08-24 RX ADMIN — Medication 10 ML: at 23:00

## 2020-08-24 RX ADMIN — SODIUM CHLORIDE: 9 INJECTION, SOLUTION INTRAVENOUS at 13:37

## 2020-08-24 RX ADMIN — DEXAMETHASONE SODIUM PHOSPHATE 10 MG: 10 INJECTION, SOLUTION INTRAMUSCULAR; INTRAVENOUS at 16:41

## 2020-08-24 RX ADMIN — DOCUSATE SODIUM 50 MG AND SENNOSIDES 8.6 MG 1 TABLET: 8.6; 5 TABLET, FILM COATED ORAL at 22:59

## 2020-08-24 RX ADMIN — ONDANSETRON HYDROCHLORIDE 4 MG: 2 INJECTION, SOLUTION INTRAMUSCULAR; INTRAVENOUS at 19:08

## 2020-08-24 RX ADMIN — FENTANYL CITRATE 50 MCG: 50 INJECTION, SOLUTION INTRAMUSCULAR; INTRAVENOUS at 17:20

## 2020-08-24 RX ADMIN — LIDOCAINE HYDROCHLORIDE 60 MG: 20 INJECTION, SOLUTION INTRAVENOUS at 16:40

## 2020-08-24 RX ADMIN — SODIUM CHLORIDE: 9 INJECTION, SOLUTION INTRAVENOUS at 22:52

## 2020-08-24 RX ADMIN — Medication 5 MG: at 17:00

## 2020-08-24 RX ADMIN — FENTANYL CITRATE 50 MCG: 50 INJECTION, SOLUTION INTRAMUSCULAR; INTRAVENOUS at 17:15

## 2020-08-24 RX ADMIN — GABAPENTIN 400 MG: 400 CAPSULE ORAL at 22:59

## 2020-08-24 RX ADMIN — ROCURONIUM BROMIDE 50 MG: 10 INJECTION, SOLUTION INTRAVENOUS at 16:40

## 2020-08-24 RX ADMIN — ONDANSETRON 4 MG: 2 INJECTION INTRAMUSCULAR; INTRAVENOUS at 20:31

## 2020-08-24 ASSESSMENT — PULMONARY FUNCTION TESTS
PIF_VALUE: 25
PIF_VALUE: 24
PIF_VALUE: 25
PIF_VALUE: 23
PIF_VALUE: 1
PIF_VALUE: 25
PIF_VALUE: 26
PIF_VALUE: 24
PIF_VALUE: 25
PIF_VALUE: 24
PIF_VALUE: 25
PIF_VALUE: 24
PIF_VALUE: 23
PIF_VALUE: 23
PIF_VALUE: 24
PIF_VALUE: 25
PIF_VALUE: 25
PIF_VALUE: 26
PIF_VALUE: 25
PIF_VALUE: 26
PIF_VALUE: 24
PIF_VALUE: 25
PIF_VALUE: 25
PIF_VALUE: 0
PIF_VALUE: 25
PIF_VALUE: 24
PIF_VALUE: 3
PIF_VALUE: 1
PIF_VALUE: 25
PIF_VALUE: 25
PIF_VALUE: 24
PIF_VALUE: 26
PIF_VALUE: 0
PIF_VALUE: 25
PIF_VALUE: 25
PIF_VALUE: 26
PIF_VALUE: 24
PIF_VALUE: 24
PIF_VALUE: 25
PIF_VALUE: 1
PIF_VALUE: 25
PIF_VALUE: 24
PIF_VALUE: 25
PIF_VALUE: 23
PIF_VALUE: 26
PIF_VALUE: 25
PIF_VALUE: 19
PIF_VALUE: 25
PIF_VALUE: 1
PIF_VALUE: 24
PIF_VALUE: 25
PIF_VALUE: 24
PIF_VALUE: 25
PIF_VALUE: 25
PIF_VALUE: 26
PIF_VALUE: 25
PIF_VALUE: 0
PIF_VALUE: 25
PIF_VALUE: 0
PIF_VALUE: 24
PIF_VALUE: 25
PIF_VALUE: 23
PIF_VALUE: 26
PIF_VALUE: 24
PIF_VALUE: 25
PIF_VALUE: 24
PIF_VALUE: 25
PIF_VALUE: 24
PIF_VALUE: 25
PIF_VALUE: 4
PIF_VALUE: 25
PIF_VALUE: 0
PIF_VALUE: 25
PIF_VALUE: 25
PIF_VALUE: 24
PIF_VALUE: 24
PIF_VALUE: 25
PIF_VALUE: 4
PIF_VALUE: 25
PIF_VALUE: 26
PIF_VALUE: 23
PIF_VALUE: 24
PIF_VALUE: 0
PIF_VALUE: 26
PIF_VALUE: 24
PIF_VALUE: 25
PIF_VALUE: 24
PIF_VALUE: 25
PIF_VALUE: 25
PIF_VALUE: 24
PIF_VALUE: 4
PIF_VALUE: 0
PIF_VALUE: 25
PIF_VALUE: 24
PIF_VALUE: 0
PIF_VALUE: 0
PIF_VALUE: 25
PIF_VALUE: 26
PIF_VALUE: 20
PIF_VALUE: 0
PIF_VALUE: 2
PIF_VALUE: 25
PIF_VALUE: 1
PIF_VALUE: 3
PIF_VALUE: 4
PIF_VALUE: 25
PIF_VALUE: 17
PIF_VALUE: 25
PIF_VALUE: 1
PIF_VALUE: 26
PIF_VALUE: 25
PIF_VALUE: 0
PIF_VALUE: 24
PIF_VALUE: 25
PIF_VALUE: 26
PIF_VALUE: 23
PIF_VALUE: 25
PIF_VALUE: 25
PIF_VALUE: 24
PIF_VALUE: 23
PIF_VALUE: 20
PIF_VALUE: 1
PIF_VALUE: 24
PIF_VALUE: 26
PIF_VALUE: 25
PIF_VALUE: 4
PIF_VALUE: 25
PIF_VALUE: 24
PIF_VALUE: 24
PIF_VALUE: 25
PIF_VALUE: 25
PIF_VALUE: 0
PIF_VALUE: 25
PIF_VALUE: 0
PIF_VALUE: 25
PIF_VALUE: 25
PIF_VALUE: 3
PIF_VALUE: 25
PIF_VALUE: 1
PIF_VALUE: 25
PIF_VALUE: 25
PIF_VALUE: 23
PIF_VALUE: 24
PIF_VALUE: 25
PIF_VALUE: 0
PIF_VALUE: 25
PIF_VALUE: 25
PIF_VALUE: 24
PIF_VALUE: 24
PIF_VALUE: 25
PIF_VALUE: 1
PIF_VALUE: 23
PIF_VALUE: 23
PIF_VALUE: 25
PIF_VALUE: 23
PIF_VALUE: 25
PIF_VALUE: 23
PIF_VALUE: 24
PIF_VALUE: 1
PIF_VALUE: 20
PIF_VALUE: 1
PIF_VALUE: 25
PIF_VALUE: 25
PIF_VALUE: 24
PIF_VALUE: 25
PIF_VALUE: 25
PIF_VALUE: 24
PIF_VALUE: 24
PIF_VALUE: 20
PIF_VALUE: 24
PIF_VALUE: 25
PIF_VALUE: 4
PIF_VALUE: 4
PIF_VALUE: 24
PIF_VALUE: 26
PIF_VALUE: 24
PIF_VALUE: 24

## 2020-08-24 ASSESSMENT — ENCOUNTER SYMPTOMS
CONSTIPATION: 0
WHEEZING: 0
SHORTNESS OF BREATH: 0
SINUS PRESSURE: 0
CHEST TIGHTNESS: 0
COUGH: 0
ABDOMINAL PAIN: 0
DIARRHEA: 0
VOMITING: 0
SORE THROAT: 0
NAUSEA: 0
EYE PAIN: 0
EYE DISCHARGE: 0

## 2020-08-24 ASSESSMENT — PAIN SCALES - GENERAL
PAINLEVEL_OUTOF10: 0
PAINLEVEL_OUTOF10: 8
PAINLEVEL_OUTOF10: 7
PAINLEVEL_OUTOF10: 0

## 2020-08-24 ASSESSMENT — PAIN - FUNCTIONAL ASSESSMENT: PAIN_FUNCTIONAL_ASSESSMENT: 0-10

## 2020-08-24 ASSESSMENT — PAIN DESCRIPTION - FREQUENCY: FREQUENCY: CONTINUOUS

## 2020-08-24 ASSESSMENT — PAIN DESCRIPTION - DESCRIPTORS
DESCRIPTORS: ACHING;DISCOMFORT
DESCRIPTORS: ACHING;BURNING

## 2020-08-24 ASSESSMENT — PAIN DESCRIPTION - PAIN TYPE
TYPE: SURGICAL PAIN
TYPE: SURGICAL PAIN

## 2020-08-24 ASSESSMENT — PAIN DESCRIPTION - ONSET: ONSET: SUDDEN

## 2020-08-24 ASSESSMENT — PAIN DESCRIPTION - LOCATION
LOCATION: NECK
LOCATION: NECK

## 2020-08-24 NOTE — PROGRESS NOTES
Patient will be brought in under observation because of significant post-op pain and dysphagia    Suyapa Stern

## 2020-08-24 NOTE — ANESTHESIA PRE PROCEDURE
Department of Anesthesiology  Preprocedure Note       Name:  Ute Wolf   Age:  60 y.o.  :  1965                                          MRN:  [id number]         Date:  2020      Surgeon: Titi Duffy):  Fly Cortes MD    Procedure: Procedure(s):  C4-C5, C5-C6, C6-C7 ANTERIOR CERVICAL DISCECTOMY FUSION --ARM, REG BED  HORSE SHOE, PLATES, SCREWS, GLOBUS    Medications prior to admission:   Prior to Admission medications    Medication Sig Start Date End Date Taking? Authorizing Provider   rizatriptan (MAXALT) 10 MG tablet Take 1 tablet by mouth daily as needed for Migraine May repeat dose in 2 hours if first dose ineffective; no more than 2 doses in 24 hours 20  Yes RAYSA Cardoso CNP   Erenumab-aooe (AIMOVIG) 140 MG/ML SOAJ Inject 140 mg into the skin every 30 days I box  Lot: [id number] exp 20  Yes RAYSA Cardoso CNP   gabapentin (NEURONTIN) 400 MG capsule Take 1 capsule by mouth 3 times daily for 90 days.  20 Yes RAYSA Deal CNP   escitalopram (LEXAPRO) 20 MG tablet Take 1 tablet by mouth Daily 10/18/19  Yes Historical Provider, MD   butalbital-acetaminophen-caffeine (FIORICET, ESGIC) -40 MG per tablet Take 1 tablet by mouth every 4 hours as needed for Headaches 19  Yes RAYSA Cardoso CNP   losartan (COZAAR) 50 MG tablet Take 1 tablet by mouth daily 10/26/18  Yes Historical Provider, MD   allopurinol (ZYLOPRIM) 100 MG tablet Take 100 mg by mouth daily  16  Yes Historical Provider, MD   Multiple Vitamins-Minerals (WOMENS ONE DAILY PO) Take 1 tablet by mouth daily    Yes Historical Provider, MD   diltiazem (CARDIZEM CD) 120 MG ER capsule TAKE 1 CAPSULE DAILY 16  Yes Frida Rios MD   ASACOL  MG TBEC TBEC tablet   800 mg 3 times daily  5/28/15  Yes Historical Provider, MD   bumetanide (BUMEX) 1 MG tablet TAKE 2 TABLETS TWICE A DAY 4/13/15  Yes Frida Rios MD   Cholecalciferol (VITAMIN D PO) Take 1,000 Units by mouth daily    Yes Historical Provider, MD   B Complex Vitamins (VITAMIN B COMPLEX) TABS Take 1 tablet by mouth daily    Yes Historical Provider, MD   cyclobenzaprine (FLEXERIL) 5 MG tablet Take 5 mg by mouth as needed for Muscle spasms (For Crohn's flare up). Yes Historical Provider, MD   potassium chloride SA (K-DUR;KLOR-CON M) 20 MEQ tablet Take 1 tablet by mouth daily. 3/3/14  Yes Mattie Ocasio MD   atorvastatin (LIPITOR) 40 MG tablet Take 40 mg by mouth daily.    Yes Historical Provider, MD   aspirin 81 MG EC tablet Take 81 mg by mouth daily    Historical Provider, MD Marques Harris (AIMOVIG) 140 MG/ML SOAJ Inject 140 mg into the skin every 30 days 7/21/20   RAYSA Briggs - CNP   PROVENTIL  (90 BASE) MCG/ACT inhaler Inhale 1 puff into the lungs every 4 hours as needed  9/25/15   Historical Provider, MD       Current medications:    Current Facility-Administered Medications   Medication Dose Route Frequency Provider Last Rate Last Dose    0.9 % sodium chloride infusion   Intravenous Continuous ROBBIE Vizcarra 100 mL/hr at 08/24/20 1337      sodium chloride flush 0.9 % injection 10 mL  10 mL Intravenous 2 times per day ROBBIE Vizcarra        sodium chloride flush 0.9 % injection 10 mL  10 mL Intravenous PRN ROBBIE Vizcarra        vancomycin 1.5 g in dextrose 5% 300 mL IVPB  1,500 mg Intravenous On Call to Erick Anton PA        HYDROmorphone (DILAUDID) injection 0.25 mg  0.25 mg Intravenous Q5 Min PRN Jay Tinajero DO        HYDROmorphone (DILAUDID) injection 0.5 mg  0.5 mg Intravenous Q5 Min PRN Shereen Geiger, DO        morphine (PF) injection 1 mg  1 mg Intravenous Q5 Min PRN Shereen Geiger, DO        morphine (PF) injection 2 mg  2 mg Intravenous Q5 Min PRN Shereen Geiger, DO        oxyCODONE-acetaminophen (PERCOCET) 5-325 MG per tablet 1 tablet  1 tablet Oral PRN Shereen Geiger, DO        Or    oxyCODONE-acetaminophen (PERCOCET) 5-325 MG Substance Use Topics    Alcohol use: Yes     Alcohol/week: 1.0 standard drinks     Types: 1 Standard drinks or equivalent per week     Frequency: Monthly or less     Comment: rare                                Counseling given: Not Answered      Vital Signs (Current):   Vitals:    08/17/20 1137 08/24/20 1309   BP:  122/79   Pulse:  58   Resp:  18   Temp:  98.6 °F (37 °C)   TempSrc:  Temporal   SpO2:  96%   Weight: 189 lb (85.7 kg) 189 lb (85.7 kg)   Height: 5' 3\" (1.6 m) 5' 3\" (1.6 m)                                              BP Readings from Last 3 Encounters:   08/24/20 122/79   07/28/20 111/65   07/21/20 113/76       NPO Status: Time of last liquid consumption: 2200                        Time of last solid consumption: 1700                        Date of last liquid consumption: 08/23/20                        Date of last solid food consumption: 08/23/20    BMI:   Wt Readings from Last 3 Encounters:   08/24/20 189 lb (85.7 kg)   07/28/20 190 lb (86.2 kg)   07/21/20 191 lb (86.6 kg)     Body mass index is 33.48 kg/m². CBC:   Lab Results   Component Value Date    WBC 8.1 08/18/2020    RBC 4.56 08/18/2020    HGB 13.3 08/18/2020    HCT 40.7 08/18/2020    MCV 89.3 08/18/2020    RDW 13.2 08/18/2020     08/18/2020       CMP:   Lab Results   Component Value Date     08/18/2020    K 3.7 08/18/2020     08/18/2020    CO2 27 08/18/2020    BUN 15 08/18/2020    CREATININE 0.9 08/18/2020    GFRAA >60 08/18/2020    LABGLOM >60 08/18/2020    GLUCOSE 174 08/18/2020    GLUCOSE 89 01/15/2012    PROT 7.1 04/22/2016    CALCIUM 9.8 08/18/2020    BILITOT 0.9 04/29/2019    ALKPHOS 95 04/29/2019    AST 22 04/29/2019    ALT 23 04/29/2019       POC Tests: No results for input(s): POCGLU, POCNA, POCK, POCCL, POCBUN, POCHEMO, POCHCT in the last 72 hours.     Coags:   Lab Results   Component Value Date    PROTIME 10.7 08/18/2020    INR 1.0 08/18/2020    APTT 31.5 11/15/2016       HCG (If Applicable): No results found for: PREGTESTUR, PREGSERUM, HCG, HCGQUANT     ABGs: No results found for: PHART, PO2ART, DZS8UPV, BPH5AHN, BEART, Q6JILCVK     Type & Screen (If Applicable):  No results found for: LABABO, LABRH    Drug/Infectious Status (If Applicable):  No results found for: HIV, HEPCAB    COVID-19 Screening (If Applicable): No results found for: COVID19      Anesthesia Evaluation  Patient summary reviewed and Nursing notes reviewed no history of anesthetic complications:   Airway: Mallampati: III  TM distance: >3 FB   Neck ROM: full  Mouth opening: > = 3 FB Dental:      Comment: None loose    Pulmonary:   (+) COPD:  sleep apnea:  decreased breath sounds,  asthma:                            Cardiovascular:  Exercise tolerance: poor (<4 METS),   (+) hypertension:,         Rhythm: regular  Rate: normal                    Neuro/Psych:   (+) CVA:, neuromuscular disease:, headaches:,             GI/Hepatic/Renal: Neg GI/Hepatic/Renal ROS            Endo/Other:    (+) DiabetesType II DM, , .                 Abdominal:           Vascular:                                        Anesthesia Plan      general     ASA 3       Induction: intravenous. MIPS: Postoperative opioids intended, Prophylactic antiemetics administered and Postoperative trial extubation. Anesthetic plan and risks discussed with patient. Plan discussed with CRNA.                   Chris Lizarraga DO   8/24/2020

## 2020-08-24 NOTE — PROGRESS NOTES
Admitted to Same Day Surgery Unit. Preop instructions given to patient & family. COVID Test done: 8/21/2020    Results: PENDING    Self-quarantine guidelines followed since tested? Yes    Any unusual S/S or concerns expressed or observed?    None

## 2020-08-25 LAB
METER GLUCOSE: 201 MG/DL (ref 74–99)
METER GLUCOSE: 209 MG/DL (ref 74–99)
METER GLUCOSE: 290 MG/DL (ref 74–99)
METER GLUCOSE: 295 MG/DL (ref 74–99)

## 2020-08-25 PROCEDURE — 97530 THERAPEUTIC ACTIVITIES: CPT

## 2020-08-25 PROCEDURE — 6360000002 HC RX W HCPCS: Performed by: NEUROLOGICAL SURGERY

## 2020-08-25 PROCEDURE — 97165 OT EVAL LOW COMPLEX 30 MIN: CPT

## 2020-08-25 PROCEDURE — 82962 GLUCOSE BLOOD TEST: CPT

## 2020-08-25 PROCEDURE — 6370000000 HC RX 637 (ALT 250 FOR IP): Performed by: NEUROLOGICAL SURGERY

## 2020-08-25 PROCEDURE — 6370000000 HC RX 637 (ALT 250 FOR IP): Performed by: FAMILY MEDICINE

## 2020-08-25 PROCEDURE — 2580000003 HC RX 258: Performed by: NEUROLOGICAL SURGERY

## 2020-08-25 PROCEDURE — 96374 THER/PROPH/DIAG INJ IV PUSH: CPT

## 2020-08-25 PROCEDURE — G0378 HOSPITAL OBSERVATION PER HR: HCPCS

## 2020-08-25 PROCEDURE — 97162 PT EVAL MOD COMPLEX 30 MIN: CPT

## 2020-08-25 RX ADMIN — INSULIN LISPRO 3 UNITS: 100 INJECTION, SOLUTION INTRAVENOUS; SUBCUTANEOUS at 12:44

## 2020-08-25 RX ADMIN — INSULIN LISPRO 3 UNITS: 100 INJECTION, SOLUTION INTRAVENOUS; SUBCUTANEOUS at 09:06

## 2020-08-25 RX ADMIN — GABAPENTIN 400 MG: 400 CAPSULE ORAL at 14:52

## 2020-08-25 RX ADMIN — VANCOMYCIN HYDROCHLORIDE 1.5 G: 10 INJECTION, POWDER, LYOPHILIZED, FOR SOLUTION INTRAVENOUS at 05:41

## 2020-08-25 RX ADMIN — OXYCODONE HYDROCHLORIDE 10 MG: 10 TABLET ORAL at 06:01

## 2020-08-25 RX ADMIN — BUMETANIDE 1 MG: 1 TABLET ORAL at 00:25

## 2020-08-25 RX ADMIN — MORPHINE SULFATE 4 MG: 4 INJECTION, SOLUTION INTRAMUSCULAR; INTRAVENOUS at 00:24

## 2020-08-25 RX ADMIN — POLYETHYLENE GLYCOL 3350 17 G: 17 POWDER, FOR SOLUTION ORAL at 09:07

## 2020-08-25 RX ADMIN — VANCOMYCIN HYDROCHLORIDE 1.5 G: 10 INJECTION, POWDER, LYOPHILIZED, FOR SOLUTION INTRAVENOUS at 17:38

## 2020-08-25 RX ADMIN — GABAPENTIN 400 MG: 400 CAPSULE ORAL at 09:07

## 2020-08-25 RX ADMIN — DOCUSATE SODIUM 50 MG AND SENNOSIDES 8.6 MG 1 TABLET: 8.6; 5 TABLET, FILM COATED ORAL at 09:07

## 2020-08-25 RX ADMIN — ESCITALOPRAM 20 MG: 10 TABLET, FILM COATED ORAL at 06:12

## 2020-08-25 RX ADMIN — OXYCODONE HYDROCHLORIDE 10 MG: 10 TABLET ORAL at 21:58

## 2020-08-25 RX ADMIN — CYCLOBENZAPRINE 10 MG: 10 TABLET, FILM COATED ORAL at 21:59

## 2020-08-25 RX ADMIN — MESALAMINE 800 MG: 400 CAPSULE, DELAYED RELEASE ORAL at 14:52

## 2020-08-25 RX ADMIN — GABAPENTIN 400 MG: 400 CAPSULE ORAL at 21:59

## 2020-08-25 RX ADMIN — CYCLOBENZAPRINE 10 MG: 10 TABLET, FILM COATED ORAL at 06:01

## 2020-08-25 RX ADMIN — POTASSIUM CHLORIDE 20 MEQ: 1500 TABLET, EXTENDED RELEASE ORAL at 09:08

## 2020-08-25 RX ADMIN — LOSARTAN POTASSIUM 50 MG: 50 TABLET, FILM COATED ORAL at 09:10

## 2020-08-25 RX ADMIN — MESALAMINE 800 MG: 400 CAPSULE, DELAYED RELEASE ORAL at 09:10

## 2020-08-25 RX ADMIN — DILTIAZEM HYDROCHLORIDE 120 MG: 120 CAPSULE, COATED, EXTENDED RELEASE ORAL at 09:11

## 2020-08-25 RX ADMIN — BUMETANIDE 2 MG: 1 TABLET ORAL at 09:08

## 2020-08-25 RX ADMIN — Medication 10 ML: at 09:07

## 2020-08-25 RX ADMIN — BISACODYL 5 MG: 5 TABLET, COATED ORAL at 09:07

## 2020-08-25 RX ADMIN — DOCUSATE SODIUM 50 MG AND SENNOSIDES 8.6 MG 1 TABLET: 8.6; 5 TABLET, FILM COATED ORAL at 21:59

## 2020-08-25 RX ADMIN — OXYCODONE HYDROCHLORIDE 10 MG: 10 TABLET ORAL at 10:31

## 2020-08-25 RX ADMIN — BUMETANIDE 2 MG: 1 TABLET ORAL at 21:59

## 2020-08-25 RX ADMIN — MESALAMINE 800 MG: 400 CAPSULE, DELAYED RELEASE ORAL at 21:59

## 2020-08-25 RX ADMIN — DESMOPRESSIN ACETATE 40 MG: 0.2 TABLET ORAL at 09:10

## 2020-08-25 RX ADMIN — INSULIN LISPRO 2 UNITS: 100 INJECTION, SOLUTION INTRAVENOUS; SUBCUTANEOUS at 17:49

## 2020-08-25 RX ADMIN — Medication 10 ML: at 21:59

## 2020-08-25 ASSESSMENT — PAIN SCALES - GENERAL
PAINLEVEL_OUTOF10: 9
PAINLEVEL_OUTOF10: 9
PAINLEVEL_OUTOF10: 3
PAINLEVEL_OUTOF10: 0
PAINLEVEL_OUTOF10: 8
PAINLEVEL_OUTOF10: 0
PAINLEVEL_OUTOF10: 7

## 2020-08-25 ASSESSMENT — PAIN DESCRIPTION - PROGRESSION: CLINICAL_PROGRESSION: GRADUALLY IMPROVING

## 2020-08-25 ASSESSMENT — PAIN DESCRIPTION - PAIN TYPE: TYPE: SURGICAL PAIN

## 2020-08-25 ASSESSMENT — PAIN DESCRIPTION - DESCRIPTORS: DESCRIPTORS: ACHING;DISCOMFORT

## 2020-08-25 ASSESSMENT — PAIN - FUNCTIONAL ASSESSMENT: PAIN_FUNCTIONAL_ASSESSMENT: PREVENTS OR INTERFERES SOME ACTIVE ACTIVITIES AND ADLS

## 2020-08-25 ASSESSMENT — PAIN DESCRIPTION - ONSET: ONSET: ON-GOING

## 2020-08-25 ASSESSMENT — PAIN DESCRIPTION - LOCATION: LOCATION: NECK

## 2020-08-25 ASSESSMENT — PAIN DESCRIPTION - FREQUENCY: FREQUENCY: INTERMITTENT

## 2020-08-25 NOTE — PROGRESS NOTES
OCCUPATIONAL THERAPY INITIAL EVALUATION      Date:2020  Patient Name: Macey Sahu  MRN: [id number]  : 1965  Room: [address] [place] [address]  Referring Provider:  Maximino Lofton MD    Evaluating OT: Aquiles Elkins OTR/L   License #  [id number]     AM-PAC Daily Activity Raw Score:     Recommended Adaptive Equipment:  TBD     Diagnosis: HERNIATED DISC    Surgery: 20: C4-C5, C5-C6, C6-C7 ANTERIOR CERVICAL DISCECTOMY FUSION --ARM, REG BED  HORSE SHOE, PLATES, SCREWS   Pertinent Medical History:   Past Medical History:   Diagnosis Date    Allergic     PCN, Most Pain medications,    Anemia     Anxiety     Arm fracture, left     Arthritis     Asthma     Cerebral artery occlusion with cerebral infarction (HCC)     Chronic kidney disease     insufficincy    COPD (chronic obstructive pulmonary disease) (HCC)     Crohn disease ([facility] 2006    [facility]'s disease ([facility]     Diabetes mellitus ([facility]     Essential hypertension 2017    Hyperlipidemia     L-S radiculopathy     Obesity     Raynaud disease     Sleep apnea     wears CPAP    Tuberculosis       Precautions:  Falls, cervical prec., collar, FWB     Home Living: Pt lives alone (states neighbor can assist but works) in a multi-level home with 2+1 lg. steps to enter and no HR. Once inside, 7-8 steps with HR up to B&B level  Bathroom setup: tub/shower   Equipment owned: sp cane, st. walker    Prior Level of Function: Ind. with ADLs , Ind. with IADLs; ambulated cane vs. walker  Driving: friends drive  Occupation: was Nurse's aide prior to     Pain Level: min.  Upper traps/ neck pain  Cognition: A&O: 4/4; Follows 2 step directions   Memory:  good   Sequencing:  good   Problem solving:  fair   Judgement/safety:  fair     Functional Assessment:   Initial Eval Status  Date: 2020 Treatment Status  Date: Short Term Goals = Long Term Goals  Treatment frequency: 3-5 days   Feeding Stand by Assist, set up   Mod I Grooming Min/Moderate Assist   Modified [place]    UB Dressing Min Assist on EOB to jamse prather  Modified [place]    LB Dressing Maximal Assist with B socks seated in chair  Modified [place]    Bathing Moderate Assist with sim. task  Modified [place]    Toileting kartik CAMERON present  Modified [place]    Bed Mobility  Supine to sit: TBA  Sit to supine:TBA    Pt. received in recliner, states she sometimes sleeps in recliner at home d/t sleep apnea  Supine to sit: Modified [place]   Sit to supine: Modified [place]    Functional Transfers Minimal Assist with sit <> stand, SPT with ww  Modified [place]    Functional Mobility Minimal Assist with ww short household distance, ~30' total  Modified [place]    Balance Sitting:     Static:  Sup    Dynamic:SBA  Standing: Min a     Activity Tolerance Fair with lt. Ax.  3L O2  spO2 & HR remained WFL throughout  Good with lt./mod. ax. Visual/  Perceptual Glasses: no        Safety Awareness fair                    good     Hand dominance: R     Strength ROM Additional Info:    RUE  WFL/5 within c-prec. WFL good  and wfl FMC/dexterity noted during ADL tasks     LUE WFL within c-prec./5  WFL good  and wfl FMC/dexterity noted during ADL tasks       [facility]   Sensation:  Pt. c/o min. numbness/ tingling R hand, resolving from before surgery  Tone: WFL B UE  Edema: min. R hand                            Comments: Upon arrival, patient seated in reclined chair, cleared by Nursing, agreeable to OT. Pt demonstrating good understanding of education/techniques, requiring additional training / education. At end of session, patient seated in chair, all needs met, RN present with call light and phone within reach, all lines and tubes intact.   Pt would benefit from continued skilled OT to increase safety and independence with completion of ADL/iADL tasks, functional transfers/mobility to improve independence and quality of life.    Treatment:  OT services provided include: skilled monitoring of vitals & pt.'s response to treatment (remained WFL on 3L O2, RN informed), instruction/training on safe & adapted techniques within precautions for completion of ADLs, proper posture and/or positioning, facilitation of functional seated & standing tolerance & balance ax. during ADLs and functional ax., skilled cuing on hand placement & proper body mechanics during functional transfer/mobility training with focus on safety, technique, precautions. Pt.  also Instructed RE: safe transfers/mobility, ADL training, role of OT, E.C. techniques, treatment plan, recs. , prec. Eval Complexity: Low    (Evaluation time includes thorough review of current medical information, gathering information on past medical history/social history and prior level of function, completion of standardized testing/informal observation of tasks, assessment of data, consultation with other medical professions/disciplines, and development of POC/Goals.)      Assessment of current deficits   Functional mobility [x]  ADLs [x] Strength [x]  Cognition []  Functional transfers  [x] IADLs [x] Safety Awareness [x]  Endurance [x]  Fine Motor Coordination [] Balance [x] Vision/perception [] Sensation []   Gross Motor Coordination [] ROM [] Delirium []                  Motor Control []    Plan of Care: OT for 2-5 x/wk.   ADL retraining [x]   Equipment needs [x]   Neuromuscular re-education [x] Energy Conservation Techniques [x]  Functional Transfer training [x] Patient and/or Family Education [x]  Functional Mobility training [x]  Environmental Modifications [x]  Cognitive re-training []   Compensatory techniques for ADLs [x]  Splinting Needs []   Positioning to improve overall function [x]   Therapeutic Activity [x]  Therapeutic Exercise  [x]  Visual/Perceptual: []    Delirium prevention/treatment  []   Other:  []    Rehab Potential: Good for established goals     Patient / Family Goal: to return home, regain strength    Patient and/or family were instructed diagnosis, prognosis/goals and plan of care. Demonstrated good understanding. [] Malnutrition indicators have been identified and nursing has been notified to ensure a dietitian consult is ordered. low Evaluation + 20    Treatment Time In: 9:25           Treatment Time Out: 9:45               Treatment Charges: Mins Units   Ther Ex  47931     Manual Therapy 06006     Thera Activities [id number]    ADL/Home Mgt [id number]    Neuro Re-ed 70483     Group Therapy      Orthotic manage/training  40903     Non-Billable Time     Total Timed Treatment 20 1           Fallon Elkins, OTR/L   License #  [id number]

## 2020-08-25 NOTE — PROGRESS NOTES
Department of Neurosurgery  Progress Note    CHIEF COMPLAINT: s/p C4-C7 ACDF 8/24    SUBJECTIVE:  Sitting up in chair. Pain controlled. No new issues overnight. REVIEW OF SYSTEMS :  Constitutional: Negative for chills and fever. Neurological: Negative for dizziness, tremors and speech change. OBJECTIVE:   VITALS:  /67   Pulse 110   Temp 97.1 °F (36.2 °C) (Temporal)   Resp 18   Ht 5' 3\" (1.6 m)   Wt 189 lb (85.7 kg)   SpO2 94%   BMI 33.48 kg/m²     PHYSICAL:  Constitutional: Appears well-nourished. Head: Normocephalic and atraumatic. Eyes: EOM are normal. Pupils are equal, round, and reactive to light. Neck: Normal range of motion. No tracheal deviation present. Cardiovascular: Normal rate. Pulmonary/Chest: No stridor. Abdominal: No distension. Neurological:   Alert and oriented x3  Face symmetric  Moving all extremities well  Sensation intact to light touch   Skin: Skin is warm and dry.    Psychiatric: Thought content normal.       DATA:  CBC:   Lab Results   Component Value Date    WBC 8.1 08/18/2020    RBC 4.56 08/18/2020    HGB 13.3 08/18/2020    HCT 40.7 08/18/2020    MCV 89.3 08/18/2020    MCH 29.2 08/18/2020    MCHC 32.7 08/18/2020    RDW 13.2 08/18/2020     08/18/2020    MPV 10.8 08/18/2020     BMP:    Lab Results   Component Value Date     08/18/2020    K 3.7 08/18/2020     08/18/2020    CO2 27 08/18/2020    BUN 15 08/18/2020    LABALBU 4.5 04/29/2019    LABALBU 4.2 01/15/2012    CREATININE 0.9 08/18/2020    CALCIUM 9.8 08/18/2020    GFRAA >60 08/18/2020    LABGLOM >60 08/18/2020    GLUCOSE 174 08/18/2020    GLUCOSE 89 01/15/2012     PT/INR:    Lab Results   Component Value Date    PROTIME 10.7 08/18/2020    INR 1.0 08/18/2020     PTT:    Lab Results   Component Value Date    APTT 31.5 11/15/2016   [APTT}    Current Inpatient Medications  Current Facility-Administered Medications: mesalamine (DELZICOL) delayed release capsule 800 mg, 800 mg, Oral, TID  atorvastatin (LIPITOR) tablet 40 mg, 40 mg, Oral, Daily  butalbital-acetaminophen-caffeine (FIORICET, ESGIC) per tablet 1 tablet, 1 tablet, Oral, Q4H PRN  cyclobenzaprine (FLEXERIL) tablet 10 mg, 10 mg, Oral, TID PRN  dilTIAZem (CARDIZEM CD) extended release capsule 120 mg, 120 mg, Oral, Daily  escitalopram (LEXAPRO) tablet 20 mg, 20 mg, Oral, Daily  gabapentin (NEURONTIN) capsule 400 mg, 400 mg, Oral, TID  losartan (COZAAR) tablet 50 mg, 50 mg, Oral, Daily  potassium chloride (KLOR-CON M) extended release tablet 20 mEq, 20 mEq, Oral, Daily  albuterol sulfate  (90 Base) MCG/ACT inhaler 1 puff, 1 puff, Inhalation, Q4H PRN  SUMAtriptan (IMITREX) tablet 100 mg, 100 mg, Oral, Daily PRN  sodium chloride flush 0.9 % injection 10 mL, 10 mL, Intravenous, 2 times per day  sodium chloride flush 0.9 % injection 10 mL, 10 mL, Intravenous, PRN  promethazine (PHENERGAN) tablet 12.5 mg, 12.5 mg, Oral, Q6H PRN **OR** ondansetron (ZOFRAN) injection 4 mg, 4 mg, Intravenous, Q6H PRN  0.9 % sodium chloride infusion, , Intravenous, Continuous  vancomycin 1.5 g in dextrose 5% 300 mL IVPB, 1,500 mg, Intravenous, Q12H  oxyCODONE (ROXICODONE) immediate release tablet 5 mg, 5 mg, Oral, Q4H PRN **OR** oxyCODONE HCl (OXY-IR) immediate release tablet 10 mg, 10 mg, Oral, Q4H PRN  morphine (PF) injection 2 mg, 2 mg, Intravenous, Q2H PRN **OR** morphine sulfate (PF) injection 4 mg, 4 mg, Intravenous, Q2H PRN  polyethylene glycol (GLYCOLAX) packet 17 g, 17 g, Oral, Daily  bisacodyl (DULCOLAX) EC tablet 5 mg, 5 mg, Oral, Daily  sennosides-docusate sodium (SENOKOT-S) 8.6-50 MG tablet 1 tablet, 1 tablet, Oral, BID  magnesium hydroxide (MILK OF MAGNESIA) 400 MG/5ML suspension 30 mL, 30 mL, Oral, Daily PRN  fleet rectal enema 1 enema, 1 enema, Rectal, Daily PRN  benzocaine-menthol (CEPACOL SORE THROAT) lozenge 1 lozenge, 1 lozenge, Oral, Q2H PRN  insulin lispro (HUMALOG) injection vial 0-6 Units, 0-6 Units, Subcutaneous, TID WC  glucose (GLUTOSE) 40 % oral gel 15 g, 15 g, Oral, PRN  dextrose 50 % IV solution, 12.5 g, Intravenous, PRN  glucagon (rDNA) injection 1 mg, 1 mg, Intramuscular, PRN  dextrose 5 % solution, 100 mL/hr, Intravenous, PRN  bumetanide (BUMEX) tablet 2 mg, 2 mg, Oral, BID    ASSESSMENT:   s/p C4-C7 ACDF 8/24    PLAN:  -Pain control  -PT/OT  -Cervical collar x 3 months  -PMR consult  -Follow up in neurosurgery clinic in 4 weeks with x-rays      Electronically signed by Ruben Morocho PA-C on 8/25/2020 at 12:20 PM

## 2020-08-25 NOTE — CONSULTS
Hospital Medicine  Consult History & Physical        Reason for consult: Medical consult    Date of Service: Pt seen/examined in consultation on 8/24/2020    History Of Present Illness:    Ms. Urbano Lima, a 60y.o. year old female  who  has a past medical history of Allergic, Anemia, Anxiety, Arm fracture, left, Arthritis, Asthma, Cerebral artery occlusion with cerebral infarction ([address] Chronic kidney disease, COPD (chronic obstructive pulmonary disease) ([address] Crohn disease ([address] Crohn's disease ([address] Diabetes mellitus ([address] Essential hypertension, Hyperlipidemia, L-S radiculopathy, Obesity, Raynaud disease, Sleep apnea, and Tuberculosis. Briefly this is a 61-year-old female who is admitted post neurosurgery for cervical discectomy and fusion by Dr. Florin Lawson. Past medical history as above. Patient has no current complaints  Patient reports she has idiopathic edema for which she takes 4 mg of Bumex daily. She is also diabetic that is diet controlled. She is not on any insulin. She has no current complaints of chest pain, shortness of she reports she has idiopathic    Review of Systems   Constitutional: Negative for chills, fatigue and fever. HENT: Negative for congestion, ear pain, sinus pressure and sore throat. Eyes: Negative for pain and discharge. Respiratory: Negative for cough, chest tightness, shortness of breath and wheezing. Cardiovascular: Negative for chest pain, palpitations and leg swelling. Gastrointestinal: Negative for abdominal pain, constipation, diarrhea, nausea and vomiting. Endocrine: Negative for cold intolerance, heat intolerance, polydipsia, polyphagia and polyuria. Genitourinary: Negative for difficulty urinating, dysuria and frequency. Musculoskeletal: Negative for arthralgias and myalgias. Skin: Negative for rash. Neurological: Negative for dizziness, weakness, light-headedness and headaches.    Psychiatric/Behavioral: Negative for dysphoric mood and sleep disturbance. The patient is not nervous/anxious. Past Medical History:        Diagnosis Date    Allergic 1980    PCN, Most Pain medications,    Anemia 1997    Anxiety 2007    Arm fracture, left     Arthritis     Asthma 1968    Cerebral artery occlusion with cerebral infarction (HCC)     Chronic kidney disease 1989    insufficincy    COPD (chronic obstructive pulmonary disease) ([address]     Crohn disease ([address] 2006    Crohn's disease ([address]     Diabetes mellitus ([facility]     Essential hypertension 9/21/2017    Hyperlipidemia     L-S radiculopathy     Obesity 2000    Raynaud disease     Sleep apnea     wears CPAP    Tuberculosis 1980       Past Surgical History:        Procedure Laterality Date    CARDIAC CATHETERIZATION  2-    Dr. Anthony Hernandez EF 65%   [facility] CARDIAC CATHETERIZATION  2007    [address]    CHOLECYSTECTOMY  [address]    COLONOSCOPY  2005    yearly    FRACTURE SURGERY  2013    ankle right    HYSTERECTOMY  1997       Medications Prior to Admission:    Prior to Admission medications    Medication Sig Start Date End Date Taking? Authorizing Provider   rizatriptan (MAXALT) 10 MG tablet Take 1 tablet by mouth daily as needed for Migraine May repeat dose in 2 hours if first dose ineffective; no more than 2 doses in 24 hours 7/21/20  Yes RAYSA Stapleton CNP   Erenumab-aooe (AIMOVIG) 140 MG/ML SOAJ Inject 140 mg into the skin every 30 days I box  Lot: [id number] exp 5/2021 7/21/20  Yes RAYSA Stapleton CNP   gabapentin (NEURONTIN) 400 MG capsule Take 1 capsule by mouth 3 times daily for 90 days.  6/18/20 9/16/20 Yes RAYSA Cueto CNP   escitalopram (LEXAPRO) 20 MG tablet Take 1 tablet by mouth Daily 10/18/19  Yes Historical Provider, MD   butalbital-acetaminophen-caffeine (FIORICET, ESGIC) -40 MG per tablet Take 1 tablet by mouth every 4 hours as needed for Headaches 11/6/19  Yes RAYSA Stapleton CNP   losartan (COZAAR) 50 MG tablet Take 1 tablet by mouth daily 10/26/18  Yes Historical Provider, MD   allopurinol (ZYLOPRIM) 100 MG tablet Take 100 mg by mouth daily  5/17/16  Yes Historical Provider, MD   Multiple Vitamins-Minerals (WOMENS ONE DAILY PO) Take 1 tablet by mouth daily    Yes Historical Provider, MD   diltiazem (CARDIZEM CD) 120 MG ER capsule TAKE 1 CAPSULE DAILY 1/27/16  Yes Liz Husbands, MD   ASACOL  MG TBEC TBEC tablet   800 mg 3 times daily  5/28/15  Yes Historical Provider, MD   bumetanide (BUMEX) 1 MG tablet TAKE 2 TABLETS TWICE A DAY 4/13/15  Yes Liz Husbands, MD   Cholecalciferol (VITAMIN D PO) Take 1,000 Units by mouth daily    Yes Historical Provider, MD   B Complex Vitamins (VITAMIN B COMPLEX) TABS Take 1 tablet by mouth daily    Yes Historical Provider, MD   cyclobenzaprine (FLEXERIL) 5 MG tablet Take 5 mg by mouth as needed for Muscle spasms (For Crohn's flare up). Yes Historical Provider, MD   potassium chloride SA (K-DUR;KLOR-CON M) 20 MEQ tablet Take 1 tablet by mouth daily. 3/3/14  Yes Costa Arroyo MD   atorvastatin (LIPITOR) 40 MG tablet Take 40 mg by mouth daily. Yes Historical Provider, MD   aspirin 81 MG EC tablet Take 81 mg by mouth daily    Historical Provider, MD   Erenumab-aooe (AIMOVIG) 140 MG/ML SOAJ Inject 140 mg into the skin every 30 days 7/21/20   Verdis Bleacher, APRN - CNP   PROVENTIL  (90 BASE) MCG/ACT inhaler Inhale 1 puff into the lungs every 4 hours as needed  9/25/15   Historical Provider, MD       Allergies:  Cephalosporins; Codeine; and Penicillins    Social History:      TOBACCO:   reports that she has never smoked. She has never used smokeless tobacco.  ETOH:   reports current alcohol use of about 1.0 standard drinks of alcohol per week. Family History:      Reviewed in detail and negative for DM, CAD, Cancer, CVA.  Positive as follows:        Problem Relation Age of Onset    Cancer Father         Bladder Cancer    Early Death Sister     Heart Disease Brother     Diabetes Brother    Bonnie Milford Heart Attack Brother     Diabetes Brother     Heart Disease Brother         MI / CABG x4    Early Death Sister        REVIEW OF SYSTEMS:   Pertinent positives as noted in the HPI. All other systems reviewed and negative. PHYSICAL EXAM:  BP (!) 142/69   Pulse 84   Temp 98.2 °F (36.8 °C) (Temporal)   Resp 20   Ht 5' 3\" (1.6 m)   Wt 189 lb (85.7 kg)   SpO2 98%   BMI 33.48 kg/m²   General appearance: No apparent distress, appears stated age and cooperative. HEENT:  Conjunctivae/corneas clear. Neck: Supple. No jugular venous distention. Respiratory: Clear to auscultation bilaterally, normal respiratory effort  Cardiovascular: Regular rate rhythm, normal S1-S2  Abdomen: Soft, nontender, nondistended  Musculoskeletal: No clubbing, cyanosis, no bilateral lower extremity edema. Brisk capillary refill. Skin:  No rashes  on visible skin  Neurologic: awake, alert and following commands   Psychiatric: Alert and oriented, thought content appropriate, normal insight    Labs:     CBC:   No results for input(s): WBC, RBC, HGB, HCT, MCV, RDW, PLT in the last 72 hours. BMP: No results for input(s): NA, K, CL, CO2, BUN, CREATININE, MG, PHOS in the last 72 hours. Invalid input(s): CA  LFT:  No results for input(s): PROT, ALB, ALKPHOS, ALT, AST, BILITOT, AMYLASE, LIPASE in the last 72 hours. CE:  No results for input(s): Gil Crockett in the last 72 hours. PT/INR: No results for input(s): INR, APTT in the last 72 hours. BNP: No results for input(s): BNP in the last 72 hours.   Hgb A1C:   Lab Results   Component Value Date    LABA1C 6.0 (H) 09/21/2017     No results found for: EAG  ESR:   Lab Results   Component Value Date    SEDRATE 16 06/18/2013     CRP: No results found for: CRP  D Dimer: No results found for: DDIMER  Folate and B12:   Lab Results   Component Value Date    VFYHTIRT89 468 04/22/2016   , No results found for: FOLATE  Lactic Acid: No results found for: LACTA  Thyroid Studies:   Lab Results Component Value Date    TSH 1.519 07/04/2012         ASSESSMENT:    Neck pain secondary to herniated cervical disks  Diabetes mellitus, diet controlled  Crohn's disease  COPD without exacerbation  CKD  History of asthma  Hypertension  Hyperlipidemia  Obesity with a BMI of 33  Raynaud's disease  Sleep apnea    PLAN:    Postop pain control and bowel regimen  PT OT evaluation has been ordered  Continue IV fluids at 50 cc an hour until morning. KAREN Pascual in the morning  Home medications have been reconciled  Would add low-dose insulin sliding scale  Given dysphagia patient is currently cannot tolerate full liquid diet and this can be advanced to a carb controlled diet later in the morning      Diet: DIET FULL LIQUID; Thank you for allowing us to participate in this patient's care.    +++++++++++++++++++++++++++++++++++++++++++++++++  [place], [place]  +++++++++++++++++++++++++++++++++++++++++++++++++  NOTE: This report was transcribed using voice recognition software. Every effort was made to ensure accuracy; however, inadvertent computerized transcription errors may be present.

## 2020-08-25 NOTE — PROGRESS NOTES
Physical Therapy  Physical Therapy Initial Assessment     Name: Cady Jimenez  : 1965  MRN: [id number]    Referring Provider:  Esther Sherman MD    Date of Service: 2020    Evaluating PT:  Katty Cummings, PT   Room #:  3928/2647-L  Diagnosis: COVID-19 [U07.1]  Preop testing [Z01.818]  Cervical herniated disc [M50.20]     PMHx/PSHx:   has a past medical history of Allergic, Anemia, Anxiety, Arm fracture, left, Arthritis, Asthma, Cerebral artery occlusion with cerebral infarction ([facility] Chronic kidney disease, COPD (chronic obstructive pulmonary disease) ([facility] Crohn disease ([facility] Crohn's disease ([facility] Diabetes mellitus ([facility] Essential hypertension, Hyperlipidemia, L-S radiculopathy, Obesity, Raynaud disease, Sleep apnea, and Tuberculosis. Procedure/Surgery:  20 C4-C5, C5-C6, C6-C7 ANTERIOR CERVICAL DISCECTOMY FUSION   Precautions:  Falls,  , FWB (full weight bearing), Spinal precautions and Cervical Collar  Equipment Needs: [address] [facility] wheeled walker. SUBJECTIVE:    Patient lives alone  in a multi level home with numerous steps   with 3 steps to enter without Rail States friends/neighbors will assist upon D/C. Patient ambulated with cane  PTA. States she recently has had multiple falls at home. Equipment owned: Cane and Standard Walker,      OBJECTIVE:   Initial Evaluation  Date: 20 Treatment Short Term/ Long Term   Goals   AM-PAC 6 Clicks      Was pt agreeable to Eval/treatment? yes     Does pt have pain? Yes cervical area. Bed Mobility  Rolling: NT  Supine to sit: NT  Sit to supine: NT  Scooting: NT  In chair pre and post session. Rolling: Ind  Supine to sit: Ind  Sit to supine: Ind  Scooting: Ind   Transfers Sit to stand: Min to fww  Stand to sit: Min  Stand pivot: Min with fww  Sit to stand: Mod Ind  Stand to sit: Mod Ind  Stand pivot: Mod Ind   Ambulation    25 feet with fww Min A with discontinuous steps. 150 feet with fww Mod Ind.     Stair negotiation: ascended and descended NT  10 steps with 1 rail Mod ind.    ROM BUE:  See OT eval  BLE:  wfl     Strength BUE: See OT eval   RLE:  4+/5  LLE:   4+/5  5/5   Balance Sitting EOB:  NT  Dynamic Standing:  Min with fww. Sitting EOB:  Ind  Dynamic Standing: Mod Ind     Patient is Alert & Oriented x person, place, time and situation and follows directions   Sensation:  Pt denies numbness and tingling to extremities  Edema:  Cervical area. Vitals:  Blood Pressure at rest - Blood Pressure post session -   Heart Rate at rest - Heart Rate post session -   SPO2 at rest 96% 3L SPO2 post session -     Therapeutic Exercises:  AROm for BLE while seated. Patient education  Patient educated on role of Physical Therapy, risks of immobility, safety and plan of care, spinal precautions and position of cervical collar. Patient response to education:   Pt verbalized understanding Pt demonstrated skill Pt requires further education in this area   yes  yes     ASSESSMENT:    Comments:  Patient was seen this date for PT evaluation. . Patient was agreeable to evaluation. Results of the functional assessment are noted above. Upon entering the room patient was found sitting in bedside chairSee above for functional activities completed. Reviewed cervical precautions with good understanding. Gait and transfers required Min A with fww. Patient would benefit from continued skilled Physical Therapy to improve functional independence and quality of life. At end of session, patient in chair with  call light and phone within reach,  all lines and tubes intact, nursing notified. This patient can benefit from the continuation of skilled PT possibly in rehab setting to maximize functional level and return to PLOF.      Treatment:  Patient practiced and was instructed in the following treatment:    · Bed mobility training - pt given verbal and tactile cues to facilitate proper sequencing and safety during rolling and supine>sit as well as provided with

## 2020-08-25 NOTE — PROGRESS NOTES
Acute Rehab Pre-Admission Screen      Referral date: 8/25/20  Onset/Hospital Admit Date: 8/24/2020 12:56 PM    Current Location: [facility]    Name: Nahid Marie: 1965  Age: 60 y.o. 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   Address: [address] [place] [address] Phone: [phone] (home)  Social Security #:     Sex: female  Race:   Marital Status:    Ethnic/Cultural/Quaker Considerations: Cheondoism    Advanced Directives: [x] Full Code  [] [address] [] Medications only       [] Living Will  [] DPOA      []Organ donor      [] No mechanical breathing or ventilation     [] no tube feeding, nutrition or hydration      [x] Patient does not have advanced directives or living will         COVERAGE INFORMATION   Primary Insurer: [id number]: [facility]  Phone: [phone]  Authorization #: [id number]  Secondary Insurer: [employer]    Verified coverage: [] Patient  [] Family/caregiver    [x] financial department [] insurance carrier    MEDICAL UPDATE:  History of present admission: 60year old female admitted 8/24/20 for elective C4-C5, C5-C6 and C6-C7 anterior cervical diskectomy and fusion to treat ongoing neck and bilateral arm pain secondary to disc herniations, stenosis and cord signal at C5-C6.   8/26/20: POD 1. No acute events overnight. BGL elevated this morning, insulin increased. Vitals stable. 8/27/20: POD 2. No acute events overnight. BGL improved on current regimen.     PHYSICIAN / REFERRAL INFORMATION  Referring Physician: Kristina Boland MD  Attending Physician: Kristina Boland MD  Primary Care Physician: Iris Pate MD  Consultants/Opinions (see full consult notes on chart): none    SOCIAL INFORMATION  Primary  Contact: Jermaine Pittmanhank  Relationship: child  Primary Phone: [phone]  Secondary  Contact: Madhavi Anaya  Relationship: child  Primary Phone: [phone]    Previous Community Services: none  Caregiver available: [x] Yes [] No Hours per day available: TBD  Patient previously employed:  [] Yes [] Part Time [] Full Time [x] No [] Retired  Occupation/Profession: disabled  Prior living arrangements: [] Home  [] Assisted living  [] SNF [] Other  Lived with:  [x] Alone  [] Spouse  [] Family  [] Other    Home:  multi story home  3 entry steps  Rails: 0  Steps:  8 to 2nd floor  Rails:  1   Bedroom: [] 1st floor  [x] 2nd floor    Bathroom:  [] 1st floor  [x] 2nd floor    Prior Functional Level: Independent for: all with cane or walker. Did not dirve  Assistance for:   Dependent for:   Dominant hand: [x] Right  [] Left    Previous Home Equipment:  [x] Cane [] Grab bars [] Orthotic / prosthetic   [] Shower chair [] Tub bench  [] 3-in-1 Commode [] Long handle sponge   [] Oxygen [] Sock aide  [] Wheelchair  [] motorized wc/scooter  [] Wheelchair cushion   [] Crutches [] Long handle shoehorn  [] Reachers [] Toilet seat elevator [] Rollator  [x] Walker(wheeled)   [] Walker(standard) [] Mechanical lift    [] None of the above     Has patient had 2 or more falls in the past year or any fall with injury in the past year? [] yes   [x] no   []unknown    Has patient had major surgery during past 100 days prior to admission? [] yes   [] no Type/ Date: 8/24/20 C4-C5, C5-C6 and C6-C7 anterior cervical diskectomy and fusion.     Surgical History:  Past Surgical History:   Procedure Laterality Date    CARDIAC CATHETERIZATION  2-    Dr. Sarah Ochoa EF 65%    CARDIAC CATHETERIZATION  2007    [address] N/A 8/24/2020    C4-C5, C5-C6, C6-C7 ANTERIOR CERVICAL DISCECTOMY FUSION --ARM, REG BED  HORSE SHOE, PLATES, SCREWS, GLOBUS performed by Dinorah Steele MD at [address] [place] [address]  [id number]    COLONOSCOPY  2005    yearly   [address]  2013    ankle right   [address]       Past Medical:  Past Medical History:   Diagnosis Date    Allergic Conditions requiring inpatient rehabilitation: Requires multidisciplinary treatment including PM&R physician daily care, 24 hour rehabilitation nursing, physical therapy, occupational therapy, rehabilitation psychology, recreation therapy and rehabilitation social work, nutrition services due to new deficits     Risk for Medical/Clinical Complications: Falls, injury, pain, skin breakdown, abnormal vitals, abnormal labs, DVT, PE, pneumonia, decreased mobility, neuro changes     CLINICAL DATA:     Height : 5'3     Weight:  189 lbs   BMI: 33.48       Date: 8/27/20 Date: 8/28/20 Date:    temperature 97.2 97.6    pulse 84 81    respirations 16 16    Blood pressure 120/66 130/86    Pulse oximeter 96% room air 95% RA       ALLERGIES: Cephalosporins; Codeine; and Penicillins    DIET : DIET DENTAL SOFT; Carb Control: 4 carb choices (60 gms)/meal    Current Lab and Diagnostic Tests:   Recent Results (from the past 24 hour(s))   POCT Glucose    Collection Time: 08/27/20  5:16 PM   Result Value Ref Range    Meter Glucose 134 (H) 74 - 99 mg/dL   POCT Glucose    Collection Time: 08/27/20  9:24 PM   Result Value Ref Range    Meter Glucose 198 (H) 74 - 99 mg/dL   POCT Glucose    Collection Time: 08/28/20  6:56 AM   Result Value Ref Range    Meter Glucose 288 (H) 74 - 99 mg/dL   POCT Glucose    Collection Time: 08/28/20 11:35 AM   Result Value Ref Range    Meter Glucose 314 (H) 74 - 99 mg/dL     No results found.     Additional labs or diagnostic studies needed before admission to rehabilitation unit:  none    Medications:   dexamethasone  4 mg Intravenous Q6H    insulin lispro  0-12 Units Subcutaneous TID WC    insulin lispro  0-6 Units Subcutaneous Nightly    mesalamine  800 mg Oral TID    atorvastatin  40 mg Oral Daily    dilTIAZem  120 mg Oral Daily    escitalopram  20 mg Oral Daily    gabapentin  400 mg Oral TID    losartan  50 mg Oral Daily    potassium chloride  20 mEq Oral Daily    sodium chloride flush  10 mL Intravenous 2 times per day    polyethylene glycol  17 g Oral Daily    bisacodyl  5 mg Oral Daily    sennosides-docusate sodium  1 tablet Oral BID    bumetanide  2 mg Oral BID      sodium chloride 50 mL/hr at 08/24/20 0182    dextrose       oxyCODONE **OR** oxyCODONE, butalbital-acetaminophen-caffeine, cyclobenzaprine, albuterol sulfate HFA, SUMAtriptan, sodium chloride flush, promethazine **OR** ondansetron, morphine **OR** morphine, magnesium hydroxide, fleet, benzocaine-menthol, glucose, dextrose, glucagon (rDNA), dextrose    SPECIAL PRECAUTIONS: [] No current precautions  [] Cardiac  [] Renal [] Sternal [] Respiratory      [] Neurological           [] Hip  [x] Spinal [] Seizure  [] Aspiration  [] Isolation precautions:    [] Contact   [] Respiratory   [] Protective     [] Droplet    [x] Weight Bearing precautions:         [] Non Weight Bearing :         [] Toe Touch Weight Bearing :        [] Partial Weight Bearing :         [x] Weight Bearing as Tolerated :         [x] Fall Risk:   [] Recent history of falls [x] Falls risk level (Resendez Scale): high      [] Bed Alarm    [] Do not leave alone in the bathroom    [] Chair Alarm    [] Cognitive impairment      [] One to One supervision  [] Sitter / Love Carterion sitter   [] Safety enclosure bed  [x] Decreased balance     SPECIAL REHABILITATION NEEDS:   [x] IV Therapy: [x] PRN Adapter  [] Midline  [] PICC      [] Central Line    [] TPN       [] Oxygen: [] Trach [] Bi-PAP [] CPAP  [] Nasal cannula  [] Liters:      [x] Wound Care:   [] Pressure ulcers(stage and location) -    [] Wound vac   [x] Wound or incision care    [x] Pain Management (level of pain, meds):      [] Incontinence Bladder [] Pascual  Insertion date:    []Hemodialysis and  Frequency:   [] Incontinence Bowel    [] Last bowel movement :     Substance use history: [] Yes  [] No   [] Tobacco  [] Alcohol  [] Other     [] Ethnic  [] Cultural  [] Spiritual  [] Language [] Needs  [] Other than English  [] Hearing Impaired  [] Visually Impaired  [] Speaking Impaired  [] Blind  [] Special equipment:  [] Devices/Splints  [] Type   [] Brace   [] Type  [] Bariatric bed  [] Extra wide commode  [] Extra wide wheelchair [] Extra wide walker  [] Marco walker  [] Marco wheelchair  [] Transfer lift    [] Other equipment     FUNCTIONAL STATUS PT / Gely Barron / Darrion Perks:  FIM / EVAL Discipline Initial: 8/25/20 Follow Up: 8/26/20 Current:    Eating OT Stand by Assist   Stand by Assist   Stand by Assist     Grooming OT Moderate Assist   Moderate Assist   Stand by Assist     Bathing OT Moderate Assist   Moderate Assist   Stand by Assist     Dressing Upper Extremity OT Minimum assistance   Minimum assistance   Stand by Assist     Dressing Lower Extremity OT Max Assist   Minimum assistance   Stand by Assist     Toileting OT nt Minimum assistance   DNT   Toilet Transfers OT nt Minimum assistance   Stand by Assist     Tub/Shower Transfers OT nt nt nt   Homemaking OT nt nt nt   Bed Mobility PT nt Stand by Assist   Stand by Assist     Bed/Wheelchair Transfers PT Minimum assistance   Minimum assistance   Stand by Assist     Locomotion Walk / Wheelchair  Device:  Distance: PT 25 ft with FWW Minimum assistance   25 ft x 2 with fww Minimum assistance   150 ft with FWW. 150 ft with CGA   Endurance PT      Expression SP      Social Interaction SP      Problem Solving SP      Memory SP      Comprehension SP      Swallowing SP      Bowel Management NSG      Bladder Management NSG        Comments on Functional Status: Able to tolerate 3 hours of therapy per day    [x] Able to participate a minimum of 3 hours per day of therapy intervention    Required treatments/services: [x] Rehabilitation nursing [] Dietitian / nurtition                 [x] Case management  [] Respiratory Therapy      [x] Social work   [] Other     Required Therapy:  Therapy Hours per Day Days per Week Therapeutic Interventions Required   [x] Physical Therapy 1.5 5-7 Gait, transfers, Safety, strength, education, endurance   [x] Occupational Therapy 1.5 5-7 ADLs, IADLs, Safety, strength, education, endurance   [] Speech Pathology      [] Prosthetics / Orthotics       []         Anticipated Discharge Plan:   Anticipated DME Needs:  [x] Home     [] Commode   [] Alone    [] Wheelchair   [] Supervised    [] Walker   [x] Assist    [] Oxygen        [] Hospital Bed  [] Assisted Living    [] Ramp        [x] To Be Determined    Anticipated Home Health Services:  Anticipated Outpatient Services:  [] PT       [] PT  [] OT      [] OT  [] Speech     [] Speech  [] Nursing     [] Dialysis  [] Aide      [x] To Be Determined  [x] To Be Determined    Anticipated support group:  [] Amputation  [] Multiple Sclerosis  [] Stroke  [] Brain Injury  [] Spinal cord injury  [] Other     Barriers to discharge: Impaired self care    Discharge Support: [] Patient lives alone and does not have a caregiver available     [x] Patient has a caregiver available     [x] Discharge plan has been verified with patient's caregiver      [x] Caregiver is in agreement with the discharge plan     Expected functional status for safe discharge: modified independent    Patient/support person goals: go home    Expected length of stay: 7-14 dyas    Discussed expected length of stay and agreeable to IRF plan: [x] Yes   [] No    Impairment Group Category: 4.130    Etiological Diagnosis: myelopathy    Primary Rehabilitation Diagnosis: cervical myelopathy    Electronically signed by uDy Gavin RN on 8/28/2020 at 1:36 PM    Prescreen completed __________________________________ (signature of prescreener)    Date:   8/28/20 Time:  1340    JUSTIFICATION FOR ADMISSION TO ACUTE REHABILITATION:  Patient has suffered decline in functional abilities for gait, transfers,  ADL's and IADL's as well as endurance. Patient has functional deficits requiring intensive therapy across multiple disciplines in order to return home safely.  Patient will Signature:_________________________________________    Date: 8/28/20    Time:  0707

## 2020-08-25 NOTE — PROGRESS NOTES
Per Dr Alejandro Escalante, patient appropriate for ARU. Will accept pending medical stability, precert approval, completed testing, and bed availability.

## 2020-08-25 NOTE — PROGRESS NOTES
Floor Called, nurse to nurse given. Spoke with Raheel Sosa RN . Patients test results review, VS reported to receiving nurse. Any and all important information regarding patient disclosed.

## 2020-08-25 NOTE — PLAN OF CARE
Problem: Discharge Planning:  Goal: Discharged to appropriate level of care  Description: Discharged to appropriate level of care  Outcome: Met This Shift     Problem: Infection - Surgical Site:  Goal: Will show no infection signs and symptoms  Description: Will show no infection signs and symptoms  8/25/2020 1625 by Kim Bernard RN  Outcome: Met This Shift     Problem: Mobility - Impaired:  Goal: Mobility will improve to maximum level  Description: Mobility will improve to maximum level  Outcome: Met This Shift     Problem: Mobility - Impaired:  Goal: Able to ambulate independently  Description: Able to ambulate independently  Outcome: Met This Shift     Problem: Pain:  Goal: Pain level will decrease  Description: Pain level will decrease  8/25/2020 1625 by Kim Bernard RN  Outcome: Met This Shift     Problem: Pain:  Goal: Control of acute pain  Description: Control of acute pain  Outcome: Met This Shift     Problem: Pain:  Goal: Control of chronic pain  Description: Control of chronic pain  Outcome: Met This Shift     Problem: Sensory Perception - Impaired:  Goal: Sensory function intact, lower extremity  Description: Sensory function intact, lower extremity  Outcome: Met This Shift     Problem: Sensory Perception - Impaired:  Goal: Circulatory function of lower extremities is within specified parameters  Description: Circulatory function of lower extremities is within specified parameters  Outcome: Met This Shift     Problem: Urinary Retention:  Goal: Urinary elimination within specified parameters  Description: Urinary elimination within specified parameters  Outcome: Met This Shift     Problem: Venous Thromboembolism:  Goal: Will show no signs or symptoms of venous thromboembolism  Description: Will show no signs or symptoms of venous thromboembolism  Outcome: Met This Shift     Problem: Nutritional:  Goal: Consumption of food in small portions  Description: Consumption of food in small

## 2020-08-25 NOTE — OP NOTE
[address]                  [address] [facility], [address] [place] [address]                                OPERATIVE REPORT    PATIENT NAME: Ally West                       :        1965  MED REC NO:   [id number]                            ROOM:       5213  ACCOUNT NO:   [de-identified]                           ADMIT DATE: 2020  PROVIDER:     Eagle Syed MD    DATE OF PROCEDURE:  2020    PREOPERATIVE DIAGNOSIS:  C4-C5, C5-C6, and C6-C7 herniated nucleus  pulposus. POSTOPERATIVE DIAGNOSIS:  C4-C5, C5-C6, and C6-C7 herniated nucleus  pulposus. OPERATIVE PROCEDURES:  C4-C5, C5-C6 and C6-C7 anterior cervical  diskectomy and fusion with use of a Globus structured FORGE machine  allograft and Globus XTEND plate with 16 mm screws. 2.  Use of intraoperative fluoroscopy, interpreted by myself, the  surgeon. ANESTHESIA:  Generalized endotracheal anesthesia. SURGEON:  Eagle Syed MD    ASSISTANT:  Chasity Mcleod, [facility]    COMPLICATIONS:  None. ESTIMATED BLOOD LOSS:  50 mL. SPECIMEN:  Disk. OPERATIVE INDICATIONS:  The patient is a 57-year-old lady who presented  to the office complaining of neck pain that radiated to her arms with  numbness, tingling, and weakness. She was found to be myelopathic. She  had failed conservative therapy and after risks, benefits, and  alternatives were discussed with the patient, it was determined that she  would undergo the above-listed procedure. OPERATIVE PROCEDURE:  The patient was brought into the operating room. A timeout was performed where she was identified by her name, medical  record number, and the operative procedure which she was about to  undergo. Next, induction of generalized endotracheal anesthesia was  then commenced. Upon completion of induction of generalized  endotracheal anesthesia, she received preoperative antibiotics. Pascual  catheter was placed.   She was then positioned on the operating table  with her head in a horseshoe and a shoulder roll in between her shoulder  blades. Next, the anterior aspect of her neck was then prepped and  draped in the usual sterile fashion. After this was done, I then  proceeded to prep the anterior aspect of her neck in the usual sterile  fashion. I used a #10-blade to make a skin incision. Monopolar cautery  was used to dissect through subcutaneous tissue. I then proceeded to  undermine a skin incision both superiorly, inferiorly, medially, and  laterally. I placed a self-retaining Weitlaner retractor into the  wound. I opened up the platysma sharply in a longitudinal fashion and  carried the dissection through the superficial, middle, and deep layers  of the anterior cervical fascia staying lateral to the trachea and the  esophagus and medial to the carotid sheath. Once I arrived along the  anterior aspect of the spine, I placed a spinal needle into the first  identifiable disk space, this was the C4-C5 disk space. I then  proceeded to then elevate the longus colli bilaterally and once this was  done, I placed a self-retaining  retractor into the wound. I  placed a Vernadine Debbie post into the C4 vertebral body and another one into the  C5 vertebral body. I distracted across the C4-C5 disk space, performed  an annulotomy with a #11 blade. I removed disk material with pituitary  rongeurs and curettes. I then took down the posterior longitudinal  ligament going from the right uncovertebral joint to left uncovertebral  joint. I then subsequently proceeded to then prepare both the superior  and inferior endplates. After this was done, I then placed a #7 Globus  rasp followed by #7 trial and ultimately a #7 Globus piece of structured  machine allograft into the C4-C5 disk space. I removed the Vernadine Debbie post  that was in the C4 vertebral body, I placed into the C6 vertebral body.    I distracted across the C5-C6 disk space, performed an annulotomy with a  #11 blade. I removed disk material with pituitary rongeurs and  curettes. I then prepared both the superior and inferior endplates. I  took down the posterior longitudinal ligament going from the right  uncovertebral joint to the left uncovertebral joint and once this was  done, I then proceeded to then place a #6 Globus rasp followed by a #6  Globus trial and ultimately a #6 Globus piece of structured machine  allograft into the C5-C6 disk space. I removed the Leveda Sandoval post that was  in the C5 vertebral body, I placed into the C7 vertebral body. I  distracted across the C6-C7 disk space, performed an annulotomy with a  #11 blade. I removed disk material with pituitary rongeurs and  curettes. I then took down the posterior longitudinal ligament going  from the right uncovertebral joint to the left uncovertebral joint. After this was done, I prepared both the superior and inferior  endplates. I then placed a #7 Globus rasp followed by a #7 Globus trial  and ultimately a #7 Globus piece of structured machine allograft into  the C6-C7 disk space. After this was done, I then proceeded to place an  anterior cervical plate along the anterior aspect of the spine. I fixed  the plate to the spine using 16 mm screws. I used intraoperative  fluoroscopy to inspect the construct, it appeared sound and after this  was done, I then irrigated the wound copiously with  antibiotic-impregnated saline. I obtained adequate hemostasis with both  monopolar and bipolar cautery, and I proceeded to then close the wound  in layers using 2-0 Vicryl for the platysma, 3-0 Vicryl for the  subcutaneous layer, and 4-0 Monocryl in a subcuticular fashion for the  skin. Dermabond was applied over the skin surface. A dry sterile  dressing was placed over this. The patient was then subsequently  extubated and transported to the postanesthesia care unit in stable  condition. There were no complications. Counts were correct.     I

## 2020-08-25 NOTE — PLAN OF CARE
Problem: Infection - Surgical Site:  Goal: Will show no infection signs and symptoms  Description: Will show no infection signs and symptoms  Outcome: Met This Shift     Problem: Pain:  Goal: Pain level will decrease  Description: Pain level will decrease  Outcome: Met This Shift     Problem: Respiratory:  Goal: Will remain free from infection  Description: Will remain free from infection  Outcome: Met This Shift     Problem: Respiratory:  Goal: Absence of aspiration  Description: Absence of aspiration  Outcome: Met This Shift

## 2020-08-25 NOTE — PROGRESS NOTES
Pt sitting up in chair-kaye well w/cervical brace on. Drowsy yet  Appropriately conversive. Visitor(pts neighbor) @ bedside. Post-op Instructions Reinforced:    Use of Incentive Spirometry q2h- encourage use  Use of PCD's when stationary in bed-reinforced importance  Pain Control- Use of pain scale and communication(white) board for pain med availability, Frequency and time available. Encourage up to chair for each meal-reinforce importance  Progress diet slowly-avoid nausea/vomiting  S/S to alert staff- sudden increase of pain/numbness of extremities, CP/SOB  Setting realistic pain goals-reaching goal before discharge. ALWAYS keep call light in reach !!  Reinforce hospital/unit expectations    Discharge Plan-Pt requesting Rehab as she lives alone and feels   insecure about returning at this time due restrictions/allowances  In activity.  Cami (RN) , pts neighbor agrees as she, Cami, will assist pt   when she is eventually discharged    Verbalized understanding of all of above-contact number given    Sunday Naqvi RN, Spine Navigator  [phone]

## 2020-08-25 NOTE — ANESTHESIA POSTPROCEDURE EVALUATION
Department of Anesthesiology  Postprocedure Note    Patient: Kathy Gutierrez  MRN: [id number]  YOB: 1965  Date of evaluation: 8/25/2020  Time:  11:00 AM     Procedure Summary     Date:  08/24/20 Room / Location:  [facility] OR  / [facility]    Anesthesia Start:  4675 Anesthesia Stop:  1947    Procedure:  C4-C5, C5-C6, C6-C7 ANTERIOR CERVICAL DISCECTOMY FUSION --ARM, REG BED  HORSE SHOE, PLATES, SCREWS, GLOBUS (N/A Neck) Diagnosis:  (HERNIATED DISC)    Surgeon:  Geoff Linn MD Responsible Provider:  Cruz Starr DO    Anesthesia Type:  general ASA Status:  3          Anesthesia Type: general    Abhinav Phase I: Abhinav Score: 10    Abhinav Phase II:      Last vitals: Reviewed and per EMR flowsheets.        Anesthesia Post Evaluation    Patient location during evaluation: PACU  Patient participation: complete - patient participated  Level of consciousness: awake and alert  Airway patency: patent  Nausea & Vomiting: no nausea and no vomiting  Complications: no  Cardiovascular status: blood pressure returned to baseline  Respiratory status: acceptable  Hydration status: euvolemic

## 2020-08-26 LAB
METER GLUCOSE: 139 MG/DL (ref 74–99)
METER GLUCOSE: 190 MG/DL (ref 74–99)
METER GLUCOSE: 191 MG/DL (ref 74–99)
METER GLUCOSE: 206 MG/DL (ref 74–99)

## 2020-08-26 PROCEDURE — 6370000000 HC RX 637 (ALT 250 FOR IP): Performed by: CLINICAL NURSE SPECIALIST

## 2020-08-26 PROCEDURE — 6360000002 HC RX W HCPCS: Performed by: NEUROLOGICAL SURGERY

## 2020-08-26 PROCEDURE — 97530 THERAPEUTIC ACTIVITIES: CPT

## 2020-08-26 PROCEDURE — 97535 SELF CARE MNGMENT TRAINING: CPT

## 2020-08-26 PROCEDURE — 82962 GLUCOSE BLOOD TEST: CPT

## 2020-08-26 PROCEDURE — 6370000000 HC RX 637 (ALT 250 FOR IP): Performed by: FAMILY MEDICINE

## 2020-08-26 PROCEDURE — 6370000000 HC RX 637 (ALT 250 FOR IP): Performed by: NEUROLOGICAL SURGERY

## 2020-08-26 PROCEDURE — 2700000000 HC OXYGEN THERAPY PER DAY

## 2020-08-26 PROCEDURE — 2580000003 HC RX 258: Performed by: NEUROLOGICAL SURGERY

## 2020-08-26 PROCEDURE — G0378 HOSPITAL OBSERVATION PER HR: HCPCS

## 2020-08-26 PROCEDURE — 6370000000 HC RX 637 (ALT 250 FOR IP): Performed by: PHYSICIAN ASSISTANT

## 2020-08-26 RX ORDER — OXYCODONE HYDROCHLORIDE 5 MG/1
5 TABLET ORAL EVERY 6 HOURS PRN
Status: DISCONTINUED | OUTPATIENT
Start: 2020-08-26 | End: 2020-08-28 | Stop reason: HOSPADM

## 2020-08-26 RX ORDER — OXYCODONE HYDROCHLORIDE 10 MG/1
10 TABLET ORAL EVERY 6 HOURS PRN
Status: DISCONTINUED | OUTPATIENT
Start: 2020-08-26 | End: 2020-08-28 | Stop reason: HOSPADM

## 2020-08-26 RX ADMIN — OXYCODONE HYDROCHLORIDE 10 MG: 10 TABLET ORAL at 04:20

## 2020-08-26 RX ADMIN — DOCUSATE SODIUM 50 MG AND SENNOSIDES 8.6 MG 1 TABLET: 8.6; 5 TABLET, FILM COATED ORAL at 09:39

## 2020-08-26 RX ADMIN — MESALAMINE 800 MG: 400 CAPSULE, DELAYED RELEASE ORAL at 20:57

## 2020-08-26 RX ADMIN — DOCUSATE SODIUM 50 MG AND SENNOSIDES 8.6 MG 1 TABLET: 8.6; 5 TABLET, FILM COATED ORAL at 20:57

## 2020-08-26 RX ADMIN — POTASSIUM CHLORIDE 20 MEQ: 1500 TABLET, EXTENDED RELEASE ORAL at 09:39

## 2020-08-26 RX ADMIN — INSULIN LISPRO 2 UNITS: 100 INJECTION, SOLUTION INTRAVENOUS; SUBCUTANEOUS at 09:17

## 2020-08-26 RX ADMIN — POLYETHYLENE GLYCOL 3350 17 G: 17 POWDER, FOR SOLUTION ORAL at 09:39

## 2020-08-26 RX ADMIN — SODIUM CHLORIDE, PRESERVATIVE FREE 10 ML: 5 INJECTION INTRAVENOUS at 17:02

## 2020-08-26 RX ADMIN — SODIUM CHLORIDE, PRESERVATIVE FREE 10 ML: 5 INJECTION INTRAVENOUS at 04:21

## 2020-08-26 RX ADMIN — INSULIN LISPRO 1 UNITS: 100 INJECTION, SOLUTION INTRAVENOUS; SUBCUTANEOUS at 21:01

## 2020-08-26 RX ADMIN — CYCLOBENZAPRINE 10 MG: 10 TABLET, FILM COATED ORAL at 20:57

## 2020-08-26 RX ADMIN — BISACODYL 5 MG: 5 TABLET, COATED ORAL at 09:23

## 2020-08-26 RX ADMIN — VANCOMYCIN HYDROCHLORIDE 1.5 G: 10 INJECTION, POWDER, LYOPHILIZED, FOR SOLUTION INTRAVENOUS at 04:20

## 2020-08-26 RX ADMIN — OXYCODONE 5 MG: 5 TABLET ORAL at 20:57

## 2020-08-26 RX ADMIN — GABAPENTIN 400 MG: 400 CAPSULE ORAL at 14:34

## 2020-08-26 RX ADMIN — ESCITALOPRAM 20 MG: 10 TABLET, FILM COATED ORAL at 06:54

## 2020-08-26 RX ADMIN — LOSARTAN POTASSIUM 50 MG: 50 TABLET, FILM COATED ORAL at 09:25

## 2020-08-26 RX ADMIN — DESMOPRESSIN ACETATE 40 MG: 0.2 TABLET ORAL at 09:25

## 2020-08-26 RX ADMIN — GABAPENTIN 400 MG: 400 CAPSULE ORAL at 20:57

## 2020-08-26 RX ADMIN — INSULIN LISPRO 2 UNITS: 100 INJECTION, SOLUTION INTRAVENOUS; SUBCUTANEOUS at 12:42

## 2020-08-26 RX ADMIN — BUMETANIDE 2 MG: 1 TABLET ORAL at 20:57

## 2020-08-26 RX ADMIN — Medication 10 ML: at 21:00

## 2020-08-26 RX ADMIN — MESALAMINE 800 MG: 400 CAPSULE, DELAYED RELEASE ORAL at 14:23

## 2020-08-26 RX ADMIN — BUMETANIDE 2 MG: 1 TABLET ORAL at 09:23

## 2020-08-26 RX ADMIN — VANCOMYCIN HYDROCHLORIDE 1.5 G: 10 INJECTION, POWDER, LYOPHILIZED, FOR SOLUTION INTRAVENOUS at 17:01

## 2020-08-26 RX ADMIN — Medication 10 ML: at 09:21

## 2020-08-26 RX ADMIN — GABAPENTIN 400 MG: 400 CAPSULE ORAL at 09:23

## 2020-08-26 RX ADMIN — DILTIAZEM HYDROCHLORIDE 120 MG: 120 CAPSULE, COATED, EXTENDED RELEASE ORAL at 09:26

## 2020-08-26 RX ADMIN — MESALAMINE 800 MG: 400 CAPSULE, DELAYED RELEASE ORAL at 09:24

## 2020-08-26 ASSESSMENT — PAIN DESCRIPTION - DESCRIPTORS: DESCRIPTORS: ACHING;DISCOMFORT;DULL

## 2020-08-26 ASSESSMENT — PAIN SCALES - GENERAL
PAINLEVEL_OUTOF10: 4
PAINLEVEL_OUTOF10: 7
PAINLEVEL_OUTOF10: 0
PAINLEVEL_OUTOF10: 0
PAINLEVEL_OUTOF10: 1

## 2020-08-26 ASSESSMENT — PAIN DESCRIPTION - LOCATION: LOCATION: NECK

## 2020-08-26 ASSESSMENT — PAIN DESCRIPTION - ORIENTATION: ORIENTATION: ANTERIOR

## 2020-08-26 ASSESSMENT — PAIN - FUNCTIONAL ASSESSMENT: PAIN_FUNCTIONAL_ASSESSMENT: PREVENTS OR INTERFERES SOME ACTIVE ACTIVITIES AND ADLS

## 2020-08-26 ASSESSMENT — PAIN DESCRIPTION - PAIN TYPE: TYPE: SURGICAL PAIN;CHRONIC PAIN

## 2020-08-26 ASSESSMENT — PAIN DESCRIPTION - FREQUENCY: FREQUENCY: INTERMITTENT

## 2020-08-26 ASSESSMENT — PAIN DESCRIPTION - ONSET: ONSET: ON-GOING

## 2020-08-26 ASSESSMENT — PAIN DESCRIPTION - PROGRESSION: CLINICAL_PROGRESSION: GRADUALLY IMPROVING

## 2020-08-26 NOTE — PLAN OF CARE
Problem: Venous Thromboembolism:  Goal: Will show no signs or symptoms of venous thromboembolism  Description: Will show no signs or symptoms of venous thromboembolism  Outcome: Met This Shift     Problem: Nutritional:  Goal: Consumption of food in small portions  Description: Consumption of food in small portions  Outcome: Met This Shift     Problem: Respiratory:  Goal: Will remain free from infection  Description: Will remain free from infection  Outcome: Met This Shift

## 2020-08-26 NOTE — PROGRESS NOTES
Physical Therapy    Name: Yoel Rios  : 1965  MRN: [id number]     Referring Provider:  John Casey MD     Date of Service: 2020     Evaluating PT:  Yeison Madden PT   Room #:  7914/4306-L  Diagnosis: COVID-19 [U07.1]   Preop testing [Z01.818]  Cervical herniated disc [M50.20]     PMHx/PSHx:   has a past medical history of Allergic, Anemia, Anxiety, Arm fracture, left, Arthritis, Asthma, Cerebral artery occlusion with cerebral infarction ([facility] [address] Chronic kidney disease, COPD (chronic obstructive pulmonary disease) ([facility] [address] Crohn disease ([facility] [address] Crohn's disease ([facility] [address] Diabetes mellitus ([facility] [address] Essential hypertension, Hyperlipidemia, L-S radiculopathy, Obesity, Raynaud disease, Sleep apnea, and Tuberculosis. Procedure/Surgery:  20 C4-C5, C5-C6, C6-C7 ANTERIOR CERVICAL DISCECTOMY FUSION   Precautions:  Falls,  , FWB (full weight bearing), Spinal precautions and Cervical Collar  Equipment Needs: Front wheeled walker.      SUBJECTIVE:     Patient lives alone  in a multi level home with numerous steps   with 3 steps to enter without Rail States friends/neighbors will assist upon D/C. Patient ambulated with cane  PTA. States she recently has had multiple falls at home. Equipment owned: Cane and Standard Walker,       OBJECTIVE:    Initial Evaluation  Date: 20 Treatment  20 Short Term/ Long Term   Goals   AM-PAC 6 Clicks      Was pt agreeable to Eval/treatment? yes  yes patient's neighbor in the room      Does pt have pain? Yes cervical area.        Bed Mobility  Rolling: NT  Supine to sit: NT  Sit to supine: NT  Scooting: NT  In chair pre and post session.   NT patient up in bedside chair pre and post session. Rolling: Ind  Supine to sit: Ind  Sit to supine: Ind  Scooting: Ind   Transfers Sit to stand: Min to fww  Stand to sit: Min  Stand pivot: Min with fww Sit to stand: Min to fww  Stand to sit: Min  Stand pivot: Min with fww  Sit to stand:  Mod Ind  Stand to sit: Rodo Brown pivot: Mod Ind   Ambulation    25 feet with fww Min A with discontinuous steps. 25 feet with fww Min A with discontinuous sitting rest then 2nd rep also 25 feet. Numerous cues for safety sequencing and to hold eyes open.   150 feet with fww Mod Ind. Stair negotiation: ascended and descended  NT  NT 10 steps with 1 rail Mod ind.    ROM BUE:  See OT eval  BLE:  wfl       Strength BUE: See OT eval   RLE:  4+/5  LLE:   4+/5   5/5   Balance Sitting EOB:  NT  Dynamic Standing:  Min with fww.    Sitting EOB:  Ind  Dynamic Standing: Mod Ind      Patient is Alert & Oriented x person, place, time and situation and follows directions   Sensation:  Pt denies numbness and tingling to extremities  Edema:  Cervical area.      Vitals:  Blood Pressure at rest - Blood Pressure post session -   Heart Rate at rest - Heart Rate post session -   SPO2 at rest 94% 3L SPO2 post session -      Therapeutic Exercises:  AROm for BLE while seated. Increased cues required secondary to being drowsy.      Patient education  Patient educated on role of Physical Therapy, risks of immobility, safety and plan of care, spinal precautions and position of cervical collar.       Patient response to education:   Pt verbalized understanding Pt demonstrated skill Pt requires further education in this area   yes   yes      ASSESSMENT:     Comments:  Patient was seen this date for PT session. Patient was agreeable to treatment. Results of the functional assessment are noted above. Upon entering the room patient was found sitting in bedside chair attempting to eat lunch. See above for functional activities completed. Reviewed cervical precautions with good understanding once awake. Gait and transfers required Min A with fww.  poor safety noted. Increased fall risk exists. Patient would benefit from continued skilled Physical Therapy to improve functional independence and quality of life.    At end of session, patient in chair with  call light and phone within reach,  all lines and tubes intact, nursing notified. This patient can benefit from the continuation of skilled PT possibly in rehab setting to maximize functional level and return to PLOF.      Treatment:  Patient practiced and was instructed in the following treatment:    · Assistive device training - pt educated on using [employer] during gait, [employer] approximation/negotiation, and hand placement during sit<>stand to [employer]  · STS and transfer training - educated on hand/foot placement, safety, and sequencing during STS and pivot transfers using assistive device  · Gait training - verbal cues for [employer] approximation/negotiation, upright posture, and safety during 90 and 180 degree turns during gait   · Education in spinal precautions.     Pt's/ family goals   1. Home when ready. Concerned about returning home with multiple steps and living Ind. .      Patient and or family understand(s) diagnosis, prognosis, and plan of care. yes     PLAN:     PT care will be provided in accordance with the objectives noted above. Exercises and functional mobility practice will be used as well as appropriate assistive devices or modalities to obtain goals. Patient and family education will also be administered as needed.     Frequency of treatments: 2-5x/week x 1-2 weeks.     Time in  1340  Time out  1420     Total Treatment Time  40 minutes      Evaluation Time includes thorough review of current medical information, gathering information on past medical history/social history and prior level of function, completion of standardized testing/informal observation of tasks, assessment of data and education on plan of care and goals.     CPT codes:    []? PT Re-evaluation 50499  []? Gait training 20569 - minutes  []? Manual therapy 55835 - minutes  [x]? Therapeutic activities 75344 40 minutes  []? Therapeutic exercises 29686 - minutes  []?  Neuromuscular reeducation 48669 - minutes         Maile Tam PT

## 2020-08-26 NOTE — CARE COORDINATION
8/26, [facility] will accept patient-provided patient has been medically cleared, precert obtained and bed availability. Recent  (8/25) PT-15/24 ambulated 25 feet and OT-14/24. SW to follow for further discharge planning.     Franko Weiss, [facility]

## 2020-08-26 NOTE — PROGRESS NOTES
OT BEDSIDE TREATMENT NOTE      Date:2020  Patient Name: Trinity Dodson  MRN: [id number]  : 1965  Room: [address] [place] [address]     Per OT Eval:    Evaluating OT: CECILE Hills/L   License #  [id number]      AM-PAC Daily Activity Raw Score:      Recommended Adaptive Equipment:  TBD      Diagnosis: HERNIATED DISC     Surgery: 20: C4-C5, C5-C6, C6-C7 ANTERIOR CERVICAL DISCECTOMY FUSION --ARM, REG BED  HORSE SHOE, PLATES, SCREWS   Pertinent Medical History:   Past Medical History        Past Medical History:   Diagnosis Date    Allergic      PCN, Most Pain medications,    Anemia     Anxiety     Arm fracture, left      Arthritis      Asthma     Cerebral artery occlusion with cerebral infarction ([facility] [address]      Chronic kidney disease      insufficincy    COPD [facility]chronic obstructive [facility]) [facility]      Crohn disease ([facility] [address]     Crohn's disease ([facility] [address]      Diabetes mellitus ([facility] [address]      Essential hypertension 2017    Hyperlipidemia      L-S radiculopathy      Obesity     Raynaud disease      Sleep apnea       wears CPAP    Tuberculosis          Precautions:  Falls, cervical prec., collar, FWB     Home Living: Pt lives alone (states neighbor can assist but works) in a multi-level home with 2+1 lg. steps to enter and no HR.   Once inside, 7-8 steps with HR up to B&B level  Bathroom setup: tub/shower   Equipment owned: sp cane, st. walker     Prior Level of Function: Ind. with ADLs , Ind. with IADLs; ambulated cane vs. walker  Driving: friends drive  Occupation: was Nurse's aide prior to      Pain Level: Pt complained of minimal pain this session  Cognition: A&O: 4/4; Follows 2 step directions              Memory:  good              Sequencing:  good              Problem solving:  fair              Judgement/safety:  fair                Functional Assessment:    Initial Eval Status  Date: 2020 Treatment Status  Date: 20 Short Term Goals =

## 2020-08-26 NOTE — PROGRESS NOTES
bisacodyl  5 mg Oral Daily    sennosides-docusate sodium  1 tablet Oral BID    bumetanide  2 mg Oral BID     PRN Meds: butalbital-acetaminophen-caffeine, cyclobenzaprine, albuterol sulfate HFA, SUMAtriptan, sodium chloride flush, promethazine **OR** ondansetron, oxyCODONE **OR** oxyCODONE, morphine **OR** morphine, magnesium hydroxide, fleet, benzocaine-menthol, glucose, dextrose, glucagon (rDNA), dextrose      Intake/Output Summary (Last 24 hours) at 8/26/2020 0934  Last data filed at 8/25/2020 2059  Gross per 24 hour   Intake 300 ml   Output 600 ml   Net -300 ml       Exam:    /60   Pulse 101   Temp 99 °F (37.2 °C) (Temporal)   Resp 20   Ht 5' 3\" (1.6 m)   Wt 189 lb (85.7 kg)   SpO2 92%   BMI 33.48 kg/m²     General appearance: No apparent distress, appears stated age and cooperative. HEENT: Pupils equal, round, and reactive to light. Conjunctivae/corneas clear. Neck: Supple, with full range of motion. No jugular venous distention. Trachea midline. Collar in place  Respiratory:  Normal respiratory effort. Clear to auscultation, bilaterally without Rales/Wheezes/Rhonchi. Cardiovascular: Regular rate and rhythm with normal S1/S2 without murmurs, rubs or gallops. Abdomen: Soft, non-tender, non-distended with normal bowel sounds. Musculoskeletal: No clubbing, cyanosis or edema bilaterally. Full range of motion without deformity. Skin: Skin color, texture, turgor normal.  No rashes or lesions. Neurologic:  Neurovascularly intact without any focal sensory/motor deficits  Psychiatric: Alert and oriented, thought content appropriate, normal insight  Capillary Refill: Brisk,< 3 seconds   Peripheral Pulses: +2 palpable, equal bilaterally       Labs:   No results for input(s): WBC, HGB, HCT, PLT in the last 72 hours. No results for input(s): NA, K, CL, CO2, BUN, CREATININE, CALCIUM, PHOS in the last 72 hours.     Invalid input(s): MAGNES  No results for input(s): AST, ALT, BILIDIR, BILITOT, ALKPHOS in

## 2020-08-26 NOTE — PROGRESS NOTES
insulin lispro (HUMALOG) injection vial 0-6 Units, 0-6 Units, Subcutaneous, Nightly  oxyCODONE (ROXICODONE) immediate release tablet 5 mg, 5 mg, Oral, Q6H PRN **OR** oxyCODONE HCl (OXY-IR) immediate release tablet 10 mg, 10 mg, Oral, Q4H PRN  mesalamine (DELZICOL) delayed release capsule 800 mg, 800 mg, Oral, TID  atorvastatin (LIPITOR) tablet 40 mg, 40 mg, Oral, Daily  butalbital-acetaminophen-caffeine (FIORICET, ESGIC) per tablet 1 tablet, 1 tablet, Oral, Q4H PRN  cyclobenzaprine (FLEXERIL) tablet 10 mg, 10 mg, Oral, TID PRN  dilTIAZem (CARDIZEM CD) extended release capsule 120 mg, 120 mg, Oral, Daily  escitalopram (LEXAPRO) tablet 20 mg, 20 mg, Oral, Daily  gabapentin (NEURONTIN) capsule 400 mg, 400 mg, Oral, TID  losartan (COZAAR) tablet 50 mg, 50 mg, Oral, Daily  potassium chloride (KLOR-CON M) extended release tablet 20 mEq, 20 mEq, Oral, Daily  albuterol sulfate  (90 Base) MCG/ACT inhaler 1 puff, 1 puff, Inhalation, Q4H PRN  SUMAtriptan (IMITREX) tablet 100 mg, 100 mg, Oral, Daily PRN  sodium chloride flush 0.9 % injection 10 mL, 10 mL, Intravenous, 2 times per day  sodium chloride flush 0.9 % injection 10 mL, 10 mL, Intravenous, PRN  promethazine (PHENERGAN) tablet 12.5 mg, 12.5 mg, Oral, Q6H PRN **OR** ondansetron (ZOFRAN) injection 4 mg, 4 mg, Intravenous, Q6H PRN  0.9 % sodium chloride infusion, , Intravenous, Continuous  vancomycin 1.5 g in dextrose 5% 300 mL IVPB, 1,500 mg, Intravenous, Q12H  morphine (PF) injection 2 mg, 2 mg, Intravenous, Q2H PRN **OR** morphine sulfate (PF) injection 4 mg, 4 mg, Intravenous, Q2H PRN  polyethylene glycol (GLYCOLAX) packet 17 g, 17 g, Oral, Daily  bisacodyl (DULCOLAX) EC tablet 5 mg, 5 mg, Oral, Daily  sennosides-docusate sodium (SENOKOT-S) 8.6-50 MG tablet 1 tablet, 1 tablet, Oral, BID  magnesium hydroxide (MILK OF MAGNESIA) 400 MG/5ML suspension 30 mL, 30 mL, Oral, Daily PRN  fleet rectal enema 1 enema, 1 enema, Rectal, Daily PRN  benzocaine-menthol

## 2020-08-27 LAB
METER GLUCOSE: 134 MG/DL (ref 74–99)
METER GLUCOSE: 171 MG/DL (ref 74–99)
METER GLUCOSE: 193 MG/DL (ref 74–99)
METER GLUCOSE: 198 MG/DL (ref 74–99)

## 2020-08-27 PROCEDURE — 2580000003 HC RX 258: Performed by: NEUROLOGICAL SURGERY

## 2020-08-27 PROCEDURE — 6370000000 HC RX 637 (ALT 250 FOR IP): Performed by: CLINICAL NURSE SPECIALIST

## 2020-08-27 PROCEDURE — 6360000002 HC RX W HCPCS: Performed by: PHYSICIAN ASSISTANT

## 2020-08-27 PROCEDURE — G0378 HOSPITAL OBSERVATION PER HR: HCPCS

## 2020-08-27 PROCEDURE — 97530 THERAPEUTIC ACTIVITIES: CPT

## 2020-08-27 PROCEDURE — 97535 SELF CARE MNGMENT TRAINING: CPT

## 2020-08-27 PROCEDURE — 96375 TX/PRO/DX INJ NEW DRUG ADDON: CPT

## 2020-08-27 PROCEDURE — 6370000000 HC RX 637 (ALT 250 FOR IP): Performed by: PHYSICIAN ASSISTANT

## 2020-08-27 PROCEDURE — 82962 GLUCOSE BLOOD TEST: CPT

## 2020-08-27 PROCEDURE — 6370000000 HC RX 637 (ALT 250 FOR IP): Performed by: NEUROLOGICAL SURGERY

## 2020-08-27 PROCEDURE — 6370000000 HC RX 637 (ALT 250 FOR IP): Performed by: FAMILY MEDICINE

## 2020-08-27 RX ORDER — POLYETHYLENE GLYCOL 3350 17 G/17G
17 POWDER, FOR SOLUTION ORAL DAILY
Status: CANCELLED | OUTPATIENT
Start: 2020-08-27

## 2020-08-27 RX ORDER — MORPHINE SULFATE 4 MG/ML
4 INJECTION, SOLUTION INTRAMUSCULAR; INTRAVENOUS
Status: CANCELLED | OUTPATIENT
Start: 2020-08-27

## 2020-08-27 RX ORDER — LOSARTAN POTASSIUM 50 MG/1
50 TABLET ORAL DAILY
Status: CANCELLED | OUTPATIENT
Start: 2020-08-27

## 2020-08-27 RX ORDER — SENNA AND DOCUSATE SODIUM 50; 8.6 MG/1; MG/1
1 TABLET, FILM COATED ORAL 2 TIMES DAILY
Status: CANCELLED | OUTPATIENT
Start: 2020-08-27

## 2020-08-27 RX ORDER — OXYCODONE HYDROCHLORIDE 5 MG/1
5 TABLET ORAL EVERY 6 HOURS PRN
Qty: 42 TABLET | Refills: 0 | Status: ON HOLD | OUTPATIENT
Start: 2020-08-27 | End: 2020-09-02 | Stop reason: HOSPADM

## 2020-08-27 RX ORDER — ONDANSETRON 2 MG/ML
4 INJECTION INTRAMUSCULAR; INTRAVENOUS EVERY 6 HOURS PRN
Status: CANCELLED | OUTPATIENT
Start: 2020-08-27

## 2020-08-27 RX ORDER — SUMATRIPTAN 50 MG/1
100 TABLET, FILM COATED ORAL DAILY PRN
Status: CANCELLED | OUTPATIENT
Start: 2020-08-27

## 2020-08-27 RX ORDER — CYCLOBENZAPRINE HCL 10 MG
10 TABLET ORAL 3 TIMES DAILY PRN
Status: CANCELLED | OUTPATIENT
Start: 2020-08-27

## 2020-08-27 RX ORDER — SODIUM CHLORIDE 9 MG/ML
INJECTION, SOLUTION INTRAVENOUS CONTINUOUS
Status: CANCELLED | OUTPATIENT
Start: 2020-08-27

## 2020-08-27 RX ORDER — MESALAMINE 400 MG/1
800 CAPSULE, DELAYED RELEASE ORAL 3 TIMES DAILY
Status: CANCELLED | OUTPATIENT
Start: 2020-08-27

## 2020-08-27 RX ORDER — DEXAMETHASONE SODIUM PHOSPHATE 4 MG/ML
4 INJECTION, SOLUTION INTRA-ARTICULAR; INTRALESIONAL; INTRAMUSCULAR; INTRAVENOUS; SOFT TISSUE EVERY 6 HOURS
Status: DISCONTINUED | OUTPATIENT
Start: 2020-08-27 | End: 2020-08-28 | Stop reason: HOSPADM

## 2020-08-27 RX ORDER — BUMETANIDE 1 MG/1
2 TABLET ORAL 2 TIMES DAILY
Status: CANCELLED | OUTPATIENT
Start: 2020-08-27

## 2020-08-27 RX ORDER — DILTIAZEM HYDROCHLORIDE 120 MG/1
120 CAPSULE, COATED, EXTENDED RELEASE ORAL DAILY
Status: CANCELLED | OUTPATIENT
Start: 2020-08-27

## 2020-08-27 RX ORDER — MORPHINE SULFATE 2 MG/ML
2 INJECTION, SOLUTION INTRAMUSCULAR; INTRAVENOUS
Status: CANCELLED | OUTPATIENT
Start: 2020-08-27

## 2020-08-27 RX ORDER — SODIUM PHOSPHATE, DIBASIC AND SODIUM PHOSPHATE, MONOBASIC 7; 19 G/133ML; G/133ML
1 ENEMA RECTAL DAILY PRN
Status: CANCELLED | OUTPATIENT
Start: 2020-08-27

## 2020-08-27 RX ORDER — ESCITALOPRAM OXALATE 10 MG/1
20 TABLET ORAL DAILY
Status: CANCELLED | OUTPATIENT
Start: 2020-08-28

## 2020-08-27 RX ORDER — DEXAMETHASONE SODIUM PHOSPHATE 4 MG/ML
4 INJECTION, SOLUTION INTRA-ARTICULAR; INTRALESIONAL; INTRAMUSCULAR; INTRAVENOUS; SOFT TISSUE EVERY 6 HOURS
Status: CANCELLED | OUTPATIENT
Start: 2020-08-27 | End: 2020-08-28

## 2020-08-27 RX ORDER — SODIUM CHLORIDE 0.9 % (FLUSH) 0.9 %
10 SYRINGE (ML) INJECTION PRN
Status: CANCELLED | OUTPATIENT
Start: 2020-08-27

## 2020-08-27 RX ORDER — SODIUM CHLORIDE 0.9 % (FLUSH) 0.9 %
10 SYRINGE (ML) INJECTION EVERY 12 HOURS SCHEDULED
Status: CANCELLED | OUTPATIENT
Start: 2020-08-27

## 2020-08-27 RX ORDER — OXYCODONE HYDROCHLORIDE 10 MG/1
10 TABLET ORAL EVERY 6 HOURS PRN
Status: CANCELLED | OUTPATIENT
Start: 2020-08-27

## 2020-08-27 RX ORDER — DEXTROSE MONOHYDRATE 25 G/50ML
12.5 INJECTION, SOLUTION INTRAVENOUS PRN
Status: CANCELLED | OUTPATIENT
Start: 2020-08-27

## 2020-08-27 RX ORDER — DEXTROSE MONOHYDRATE 50 MG/ML
100 INJECTION, SOLUTION INTRAVENOUS PRN
Status: CANCELLED | OUTPATIENT
Start: 2020-08-27

## 2020-08-27 RX ORDER — CYCLOBENZAPRINE HCL 10 MG
10 TABLET ORAL 3 TIMES DAILY PRN
Qty: 40 TABLET | Refills: 0 | Status: ON HOLD | OUTPATIENT
Start: 2020-08-27 | End: 2020-09-02 | Stop reason: HOSPADM

## 2020-08-27 RX ORDER — GABAPENTIN 400 MG/1
400 CAPSULE ORAL 3 TIMES DAILY
Status: CANCELLED | OUTPATIENT
Start: 2020-08-27

## 2020-08-27 RX ORDER — NICOTINE POLACRILEX 4 MG
15 LOZENGE BUCCAL PRN
Status: CANCELLED | OUTPATIENT
Start: 2020-08-27

## 2020-08-27 RX ORDER — OXYCODONE HYDROCHLORIDE 5 MG/1
5 TABLET ORAL EVERY 6 HOURS PRN
Status: CANCELLED | OUTPATIENT
Start: 2020-08-27

## 2020-08-27 RX ORDER — ATORVASTATIN CALCIUM 40 MG/1
40 TABLET, FILM COATED ORAL DAILY
Status: CANCELLED | OUTPATIENT
Start: 2020-08-27

## 2020-08-27 RX ORDER — PROMETHAZINE HYDROCHLORIDE 25 MG/1
12.5 TABLET ORAL EVERY 6 HOURS PRN
Status: CANCELLED | OUTPATIENT
Start: 2020-08-27

## 2020-08-27 RX ORDER — BUTALBITAL, ACETAMINOPHEN AND CAFFEINE 50; 325; 40 MG/1; MG/1; MG/1
1 TABLET ORAL EVERY 4 HOURS PRN
Status: CANCELLED | OUTPATIENT
Start: 2020-08-27

## 2020-08-27 RX ORDER — POTASSIUM CHLORIDE 20 MEQ/1
20 TABLET, EXTENDED RELEASE ORAL DAILY
Status: CANCELLED | OUTPATIENT
Start: 2020-08-27

## 2020-08-27 RX ADMIN — ESCITALOPRAM 20 MG: 10 TABLET, FILM COATED ORAL at 09:10

## 2020-08-27 RX ADMIN — DILTIAZEM HYDROCHLORIDE 120 MG: 120 CAPSULE, COATED, EXTENDED RELEASE ORAL at 09:11

## 2020-08-27 RX ADMIN — LOSARTAN POTASSIUM 50 MG: 50 TABLET, FILM COATED ORAL at 09:10

## 2020-08-27 RX ADMIN — BUMETANIDE 2 MG: 1 TABLET ORAL at 21:26

## 2020-08-27 RX ADMIN — OXYCODONE HYDROCHLORIDE 10 MG: 10 TABLET ORAL at 03:35

## 2020-08-27 RX ADMIN — INSULIN LISPRO 2 UNITS: 100 INJECTION, SOLUTION INTRAVENOUS; SUBCUTANEOUS at 13:10

## 2020-08-27 RX ADMIN — CYCLOBENZAPRINE 10 MG: 10 TABLET, FILM COATED ORAL at 21:26

## 2020-08-27 RX ADMIN — GABAPENTIN 400 MG: 400 CAPSULE ORAL at 13:17

## 2020-08-27 RX ADMIN — BUMETANIDE 2 MG: 1 TABLET ORAL at 09:10

## 2020-08-27 RX ADMIN — INSULIN LISPRO 2 UNITS: 100 INJECTION, SOLUTION INTRAVENOUS; SUBCUTANEOUS at 09:12

## 2020-08-27 RX ADMIN — GABAPENTIN 400 MG: 400 CAPSULE ORAL at 09:11

## 2020-08-27 RX ADMIN — POTASSIUM CHLORIDE 20 MEQ: 1500 TABLET, EXTENDED RELEASE ORAL at 09:11

## 2020-08-27 RX ADMIN — GABAPENTIN 400 MG: 400 CAPSULE ORAL at 21:26

## 2020-08-27 RX ADMIN — DEXAMETHASONE SODIUM PHOSPHATE 4 MG: 4 INJECTION, SOLUTION INTRAMUSCULAR; INTRAVENOUS at 22:53

## 2020-08-27 RX ADMIN — SODIUM CHLORIDE, PRESERVATIVE FREE 10 ML: 5 INJECTION INTRAVENOUS at 19:10

## 2020-08-27 RX ADMIN — DESMOPRESSIN ACETATE 40 MG: 0.2 TABLET ORAL at 09:10

## 2020-08-27 RX ADMIN — Medication 10 ML: at 09:11

## 2020-08-27 RX ADMIN — MESALAMINE 800 MG: 400 CAPSULE, DELAYED RELEASE ORAL at 09:10

## 2020-08-27 ASSESSMENT — PAIN DESCRIPTION - PROGRESSION: CLINICAL_PROGRESSION: GRADUALLY WORSENING

## 2020-08-27 ASSESSMENT — PAIN SCALES - GENERAL
PAINLEVEL_OUTOF10: 6
PAINLEVEL_OUTOF10: 7
PAINLEVEL_OUTOF10: 6
PAINLEVEL_OUTOF10: 3

## 2020-08-27 ASSESSMENT — PAIN DESCRIPTION - ONSET: ONSET: ON-GOING

## 2020-08-27 ASSESSMENT — PAIN DESCRIPTION - LOCATION: LOCATION: NECK

## 2020-08-27 ASSESSMENT — PAIN DESCRIPTION - ORIENTATION: ORIENTATION: ANTERIOR

## 2020-08-27 ASSESSMENT — PAIN - FUNCTIONAL ASSESSMENT: PAIN_FUNCTIONAL_ASSESSMENT: PREVENTS OR INTERFERES SOME ACTIVE ACTIVITIES AND ADLS

## 2020-08-27 ASSESSMENT — PAIN DESCRIPTION - DESCRIPTORS: DESCRIPTORS: ACHING;DISCOMFORT;DULL

## 2020-08-27 ASSESSMENT — PAIN DESCRIPTION - PAIN TYPE: TYPE: SURGICAL PAIN

## 2020-08-27 ASSESSMENT — PAIN DESCRIPTION - FREQUENCY: FREQUENCY: INTERMITTENT

## 2020-08-27 NOTE — PROGRESS NOTES
OT BEDSIDE TREATMENT NOTE      Date:2020  Patient Name: Fallon Van  MRN: [id number]  : 1965  Room: [address] [place] [address]     Per OT Eval:    Evaluating OT: Kanu Elkins OTR/L   License #  [id number]      AM-PAC Daily Activity Raw Score:      Recommended Adaptive Equipment:  TBD      Diagnosis: HERNIATED DISC     Surgery: 20: C4-C5, C5-C6, C6-C7 ANTERIOR CERVICAL DISCECTOMY FUSION --ARM, REG BED  HORSE SHOE, PLATES, SCREWS   Pertinent Medical History:   Past Medical History        Past Medical History:   Diagnosis Date    Allergic      PCN, Most Pain medications,    Anemia     Anxiety     Arm fracture, left      Arthritis      Asthma     Cerebral artery occlusion with cerebral infarction ([place] [address]      Chronic kidney disease      insufficincy    [facility]) ([facility])      Crohn disease ([facility] [address]     Crohn's disease ([facility] [address]      Diabetes mellitus ([facility] [address]      Essential hypertension 2017    Hyperlipidemia      L-S radiculopathy      Obesity     Raynaud disease      Sleep apnea       wears CPAP    Tuberculosis          Precautions:  Falls, cervical prec., collar, FWB     Home Living: Pt lives alone (states neighbor can assist but works) in a multi-level home with 2+1 lg. steps to enter and no HR.   Once inside, 7-8 steps with HR up to B&B level  Bathroom setup: tub/shower   Equipment owned: sp cane, st. walker     Prior Level of Function: Ind. with ADLs , Ind. with IADLs; ambulated cane vs. walker  Driving: friends drive  Occupation: was Nurse's aide prior to      Pain Level: Pt complained of minimal pain this session  Cognition: A&O: 4/4; Follows 2 step directions              Memory:  good              Sequencing:  good              Problem solving:  fair              Judgement/safety:  fair                Functional Assessment:    Initial Eval Status  Date: 2020 Treatment Status  Date: 20 Short Term Goals = Long Term Goals  Treatment frequency: 3-5 days   Feeding Stand by Assist, set up   SBA Mod I    Grooming Min/Moderate Assist  SBA  To wash hands standing at sink Modified [place]    UB Dressing Min Assist on EOB to james robe SBA  To doff gown around back while seated Modified [place]    LB Dressing Maximal Assist with B socks seated in chair SBA  To don/doff socks seated EOB Modified [place]    Bathing Moderate Assist with sim. task Mod A  Per last tx  Modified [place]    Toileting NT, kartik cath present  SBA  SBA for hygiene and clothing management Modified [place]    Bed Mobility  Supine to sit: TBA  Sit to supine:TBA     Pt. received in recliner, states she sometimes sleeps in recliner at home d/t sleep apnea SBA  Supine<>EOB  Educated pt on technique to increase independence.    Supine to sit: Modified [place]   Sit to supine: Modified [place]    Functional Transfers Minimal Assist with sit <> stand, SPT with ww  SBA  Sit to Stand  Stand to Sit    Cuing for safety Modified [place]    Functional Mobility Minimal Assist with ww short household distance, ~30' total SBA  Home distance using w/w Modified [place]    Balance Sitting:     Static:  Sup    Dynamic:SBA  Standing: Min a  Sitting:  Ind    Standing:  SBA     Activity Tolerance Fair with lt. Ax.  3L O2  spO2 & HR remained WFL throughout  Fair     Good with lt./mod. ax. Visual/  Perceptual Glasses: no          Safety Awareness fair                     good      Comments: Upon arrival pt supine in bed. Educated on cervical precautions. Pt educated on adaptive techniques to increase independence and safety during ADL's, bed mobility, and functional transfers while maintaining. Discussed home set up with pt, giving suggestions to increase safety at discharge. At end of session, pt supine in bed, all lines and tubes intact, call light within reach. · Pt has made good progress towards set goals.    · Continue with current plan of care      Treatment Time In: 3:45            Treatment Time Out: 4:00          Treatment Charges: Mins Units   Ther Ex  07935     Manual Therapy 95004     Thera Activities 88487     ADL/Home Mgt 31647 15 1   Neuro Re-ed 69608     Group Therapy      Orthotic manage/training  60868     Non-Billable Time     Total Timed Treatment 15 1        [address], [address]

## 2020-08-27 NOTE — PROGRESS NOTES
Hospitalist Progress Note      PCP: Chelsea Caballero MD    Date of Admission: 8/24/2020    Chief Complaint: medical management    Hospital Course:   Ms. Onur Lee, a 60y.o. year old female  who  has a past medical history of Allergic, Anemia, Anxiety, Arm fracture, left, Arthritis, Asthma, Cerebral artery occlusion with cerebral infarction [facility]), Chronic kidney disease, COPD (chronic obstructive pulmonary disease) ([facility] [address] [facility] Crohn's disease ([facility] [address] Diabetes mellitus ([facility] Essential hypertension, Hyperlipidemia, L-S radiculopathy, Obesity, Raynaud disease, Sleep apnea, and Tuberculosis. Briefly this is a 69-year-old female who is admitted post neurosurgery for cervical discectomy and fusion by Dr. Mil Workman. Past medical history as above. Patient has no current complaints  Patient reports she has idiopathic edema for which she takes 4 mg of Bumex daily. She is also diabetic that is diet controlled. She is not on any insulin. She has no current complaints of chest pain, shortness of breath.  8/26/20: POD 1. No acute events overnight. BGL elevated this morning, insulin increased. Vitals stable. 8/27/20: POD 2. No acute events overnight. BGL improved on current regimen. Precert pending for ARU. Subjective:   Patient sitting up in chair at bedside, much more alert today. States she had a BM yesterday.      Medications:  Reviewed    Infusion Medications    sodium chloride 50 mL/hr at 08/24/20 2252    dextrose       Scheduled Medications    insulin lispro  0-12 Units Subcutaneous TID WC    insulin lispro  0-6 Units Subcutaneous Nightly    mesalamine  800 mg Oral TID    atorvastatin  40 mg Oral Daily    dilTIAZem  120 mg Oral Daily    escitalopram  20 mg Oral Daily    gabapentin  400 mg Oral TID    losartan  50 mg Oral Daily    potassium chloride  20 mEq Oral Daily    sodium chloride flush  10 mL Intravenous 2 times per day    polyethylene glycol  17 g Oral Daily    bisacodyl  5 mg Oral Daily    sennosides-docusate sodium  1 tablet Oral BID    bumetanide  2 mg Oral BID     PRN Meds: oxyCODONE **OR** oxyCODONE, butalbital-acetaminophen-caffeine, cyclobenzaprine, albuterol sulfate HFA, SUMAtriptan, sodium chloride flush, promethazine **OR** ondansetron, morphine **OR** morphine, magnesium hydroxide, fleet, benzocaine-menthol, glucose, dextrose, glucagon (rDNA), dextrose      Intake/Output Summary (Last 24 hours) at 8/27/2020 1002  Last data filed at 8/26/2020 1159  Gross per 24 hour   Intake 180 ml   Output --   Net 180 ml       Exam:    BP (!) 159/85   Pulse 88   Temp 96.9 °F (36.1 °C) (Temporal)   Resp 17   Ht 5' 3\" (1.6 m)   Wt 189 lb (85.7 kg)   SpO2 92%   BMI 33.48 kg/m²     General appearance: No apparent distress, appears stated age and cooperative. HEENT: Pupils equal, round, and reactive to light. Conjunctivae/corneas clear. Neck: Supple, with full range of motion. No jugular venous distention. Trachea midline. Collar in place  Respiratory:  Normal respiratory effort. Clear to auscultation, bilaterally without Rales/Wheezes/Rhonchi. Cardiovascular: Regular rate and rhythm with normal S1/S2 without murmurs, rubs or gallops. Abdomen: Soft, non-tender, non-distended with normal bowel sounds. Musculoskeletal: No clubbing, cyanosis or edema bilaterally. Full range of motion without deformity. Skin: Skin color, texture, turgor normal.  No rashes or lesions. Neurologic:  Neurovascularly intact without any focal sensory/motor deficits  Psychiatric: Alert and oriented, thought content appropriate, normal insight  Capillary Refill: Brisk,< 3 seconds   Peripheral Pulses: +2 palpable, equal bilaterally       Labs:   No results for input(s): WBC, HGB, HCT, PLT in the last 72 hours. No results for input(s): NA, K, CL, CO2, BUN, CREATININE, CALCIUM, PHOS in the last 72 hours.     Invalid input(s): MAGNES  No results for input(s): AST, ALT, BILIDIR, BILITOT, ALKPHOS in the last 72 hours. No results for input(s): INR in the last 72 hours. No results for input(s): Essence Grumet in the last 72 hours. FLUORO FOR SURGICAL PROCEDURES   Final Result   Intraoperative fluoroscopy provided for neurosurgical   procedure of the cervical spine. The study was dictated by Thuy Kam PA-C and Melanie Reveles. [facility] MD   reviewed and concurred with the findings.           Assessment/Plan:    Active Hospital Problems    Diagnosis Date Noted    Cervical herniated disc [M50.20] 08/24/2020    Frequent falls [R29.6] 07/21/2020    Hx-TIA (transient ischemic attack) [Z86.73] 11/06/2019    Stage 3 chronic kidney disease ([facility] [address] [facility] [N18.3] 09/21/2017    Essential hypertension [I10] 09/21/2017    Type 2 diabetes mellitus with renal complication ([facility] [address] [U37.31] 09/21/2017    HLD (hyperlipidemia) [E78.5] 09/21/2017     Plan  Pain control  Glucose control-insulin sliding scale  BP control-losartan  Continue antibiotics-vanc  Monitor bowel function  IS/C&DB while awake  Remainder per primary team    DVT Prophylaxis: SCDs  Diet: DIET DENTAL SOFT; Carb Control: 4 carb choices (60 gms)/meal  Code Status: Full Code    PT/OT Eval Status: ordered    Dispo - per primary-ok for discharge from medical POV    Price Chan CNP

## 2020-08-27 NOTE — PROGRESS NOTES
Physical Therapy    Name: Tala Yanes  : 1965  DDW: [id number]     Referring Néstor Lock MD     Date of Service: 2020     Evaluating PT: Saintclair Posner, [place]   Room #:  6287/7098-L  Diagnosis: COVID-19 [U07.1]   Preop testing [Z01.818]  Cervical herniated disc [M50.20]     PMHx/PSHx:   has a past medical history of Allergic, Anemia, Anxiety, Arm fracture, left, Arthritis, Asthma, Cerebral artery occlusion with cerebral infarction ([facility] [address] Chronic kidney disease, COPD (chronic obstructive pulmonary disease) ([facility] [address] Crohn disease ([facility] [address] Crohn's disease ([facility] [address] Diabetes mellitus ([facility] [address] Essential hypertension, Hyperlipidemia, L-S radiculopathy, Obesity, Raynaud disease, Sleep apnea, and Tuberculosis. Procedure/Surgery:  20 C4-C5, C5-C6, C6-C7 ANTERIOR CERVICAL DISCECTOMY FUSION   Precautions:  Falls,  , FWB (full weight bearing), Spinal precautions and Cervical Collar  Equipment Needs: Front wheeled walker.      SUBJECTIVE:     Patient lives alone  in a multi level home with numerous steps   with 3 steps to enter without Rail States friends/neighbors will assist upon D/C.  Patient ambulated with cane  PTA. States she recently has had multiple falls at home.  Equipment owned: Cane and Standard Walker,       OBJECTIVE:    Initial Evaluation  Date: 20 Treatment  20 Short Term/ Long Term   Goals   AM-PAC 6 Clicks 79/60  86/99     Was pt agreeable to Eval/treatment? yes  yes patient's neighbor in the room      Does pt have pain? Yes cervical area.        Bed Mobility  Rolling: NT  Supine to sit: NT  Sit to supine: NT  Scooting: NT  In chair pre and post session.   NT patient up in bedside chair pre and post session. Rolling: Ind  Supine to sit: Ind  Sit to supine: Ind  Scooting: Ind   Transfers Sit to stand: Min to fww  Stand to sit: Min  Stand pivot: Min with fww Sit to stand: SBA to fww  Stand to sit: SBA  Stand pivot: SBA with fww  Sit to stand:  Mod Ind  Stand to sit: Mod Ind  Stand pivot: Mod Ind   Ambulation    25 feet with fww Min A with discontinuous steps.   110 feet with fww SBA A with fww x 2 reps. 150 feet with fww Mod Ind.    Stair negotiation: ascended and descended  NT 5 steps CGA with R rail and hand hold lead with RLE. 10 steps with 1 rail Mod ind.    ROM BUE:  See OT eval  BLE:  wfl       Strength BUE: See OT eval   RLE:  4+/5  LLE:   4+/5   5/5   Balance Sitting EOB:  NT  Dynamic Standing:  Min with fww.    Sitting EOB:  Ind  Dynamic Standing:  Mod Ind      Patient is Alert & Oriented x person, place, time and situation and follows directions   Sensation:  Pt denies numbness and tingling to extremities  Edema:  Cervical area.      Vitals:  Blood Pressure at rest - Blood Pressure post session -   Heart Rate at rest - Heart Rate post session -   SPO2 at rest 94% 3L SPO2 post session -      Therapeutic Exercises:  AROm for BLE while seated. Increased cues required secondary to being drowsy.      Patient education  Patient educated on role of Physical Therapy, risks of immobility, safety and plan of care, spinal precautions and position of cervical collar.       Patient response to education:   Pt verbalized understanding Pt demonstrated skill Pt requires further education in this area   yes   yes      ASSESSMENT:     Comments:  Patient was seen this date for PT session. Patient was agreeable to treatment. Results of the functional assessment are noted above.  Upon entering the room patient was found sitting in bedside chair. Improved alertness level this date. See above for functional activities completed. Improved function this date. Gait and transfers required SBA with fww. improved safety noted. Cues now provided to slow down. And to stay with fww for support. All activity completed on RA 97%. IV was noted to be dislodged on R hand prior to treatment. Steps completed with cues for sequencing.    Patient would benefit from continued skilled Physical Therapy to improve functional independence and quality of life.   At end of session, patient in [place] [address] and phone within reach,  all lines and tubes intact, nursing notified. This patient can benefit from the continuation of skilled PT possibly in rehab setting to maximize functional level and return to [facility].      Treatment:  Patient practiced and was instructed in the following treatment:    · Assistive device training - pt educated on using [address] during gait, [address] approximation/negotiation, and hand placement during sit<>stand to [address]  · STS and transfer training - educated on hand/foot placement, safety, and sequencing during STS and pivot transfers using assistive device  · Gait training - verbal cues for [address] approximation/negotiation, upright posture, and safety during 90 and 180 degree turns during gait   · Education in spinal precautions.     Pt's/ family goals   1. Home when ready. Concerned about returning home with multiple steps and living Ind. .      Patient and or family understand(s) diagnosis, prognosis, and plan of care. yes     PLAN:     PT care will be provided in accordance with the objectives noted above. Exercises and functional mobility practice will be used as well as appropriate assistive devices or modalities to obtain goals. Patient and family education will also be administered as needed.     Frequency of treatments: 2-5x/week x 1-2 weeks.     Time WW  6198  Time KEVIN  0262     Total Treatment Time  30 minutes      Evaluation Time includes thorough review of current medical information, gathering information on past medical history/social history and prior level of function, completion of standardized testing/informal observation of tasks, assessment of data and education on plan of care and goals.     CPT codes:     []? ? PT Re-evaluation [id number]  []? ? Gait training 60122 - minutes  []? ? Manual therapy 59934 - minutes  [x]? ? Therapeutic activities 05787 30 minutes  []? ? Therapeutic exercises 36343 - minutes  []? ? Neuromuscular reeducation 47543 - minutes         Mitchel Alfaro, PT

## 2020-08-27 NOTE — PROGRESS NOTES
Subcutaneous, TID WC  insulin lispro (HUMALOG) injection vial 0-6 Units, 0-6 Units, Subcutaneous, Nightly  oxyCODONE (ROXICODONE) immediate release tablet 5 mg, 5 mg, Oral, Q6H PRN **OR** oxyCODONE HCl (OXY-IR) immediate release tablet 10 mg, 10 mg, Oral, Q6H PRN  mesalamine (DELZICOL) delayed release capsule 800 mg, 800 mg, Oral, TID  atorvastatin (LIPITOR) tablet 40 mg, 40 mg, Oral, Daily  butalbital-acetaminophen-caffeine (FIORICET, ESGIC) per tablet 1 tablet, 1 tablet, Oral, Q4H PRN  cyclobenzaprine (FLEXERIL) tablet 10 mg, 10 mg, Oral, TID PRN  dilTIAZem (CARDIZEM CD) extended release capsule 120 mg, 120 mg, Oral, Daily  escitalopram (LEXAPRO) tablet 20 mg, 20 mg, Oral, Daily  gabapentin (NEURONTIN) capsule 400 mg, 400 mg, Oral, TID  losartan (COZAAR) tablet 50 mg, 50 mg, Oral, Daily  potassium chloride (KLOR-CON M) extended release tablet 20 mEq, 20 mEq, Oral, Daily  albuterol sulfate  (90 Base) MCG/ACT inhaler 1 puff, 1 puff, Inhalation, Q4H PRN  SUMAtriptan (IMITREX) tablet 100 mg, 100 mg, Oral, Daily PRN  sodium chloride flush 0.9 % injection 10 mL, 10 mL, Intravenous, 2 times per day  sodium chloride flush 0.9 % injection 10 mL, 10 mL, Intravenous, PRN  promethazine (PHENERGAN) tablet 12.5 mg, 12.5 mg, Oral, Q6H PRN **OR** ondansetron (ZOFRAN) injection 4 mg, 4 mg, Intravenous, Q6H PRN  0.9 % sodium chloride infusion, , Intravenous, Continuous  morphine (PF) injection 2 mg, 2 mg, Intravenous, Q2H PRN **OR** morphine sulfate (PF) injection 4 mg, 4 mg, Intravenous, Q2H PRN  polyethylene glycol (GLYCOLAX) packet 17 g, 17 g, Oral, Daily  bisacodyl (DULCOLAX) EC tablet 5 mg, 5 mg, Oral, Daily  sennosides-docusate sodium (SENOKOT-S) 8.6-50 MG tablet 1 tablet, 1 tablet, Oral, BID  magnesium hydroxide (MILK OF MAGNESIA) 400 MG/5ML suspension 30 mL, 30 mL, Oral, Daily PRN  fleet rectal enema 1 enema, 1 enema, Rectal, Daily PRN  benzocaine-menthol (CEPACOL SORE THROAT) lozenge 1 lozenge, 1 lozenge, Oral, Q2H PRN  glucose (GLUTOSE) 40 % oral gel 15 g, 15 g, Oral, PRN  dextrose 50 % IV solution, 12.5 g, Intravenous, PRN  glucagon (rDNA) injection 1 mg, 1 mg, Intramuscular, PRN  dextrose 5 % solution, 100 mL/hr, Intravenous, PRN  bumetanide (BUMEX) tablet 2 mg, 2 mg, Oral, BID    ASSESSMENT:   s/p C4-C7 ACDF 8/24    PLAN:  -Pain control  -PT/OT  -Cervical collar x 3 months  -PMR consult  -medrol dose pack  -Follow up in neurosurgery clinic in 4 weeks with x-rays      Electronically signed by ROBBIE Hernandez on 8/27/2020 at 3:18 PM

## 2020-08-28 ENCOUNTER — HOSPITAL ENCOUNTER (INPATIENT)
Age: 55
LOS: 5 days | Discharge: HOME HEALTH | DRG: 552 | End: 2020-09-02
Attending: PHYSICAL MEDICINE & REHABILITATION | Admitting: PHYSICAL MEDICINE & REHABILITATION
Payer: COMMERCIAL

## 2020-08-28 VITALS
BODY MASS INDEX: 33.49 KG/M2 | RESPIRATION RATE: 18 BRPM | TEMPERATURE: 97.2 F | OXYGEN SATURATION: 97 % | WEIGHT: 189 LBS | DIASTOLIC BLOOD PRESSURE: 92 MMHG | HEIGHT: 63 IN | HEART RATE: 71 BPM | SYSTOLIC BLOOD PRESSURE: 130 MMHG

## 2020-08-28 PROBLEM — G95.9 CERVICAL MYELOPATHY (HCC): Status: ACTIVE | Noted: 2020-08-28

## 2020-08-28 LAB
METER GLUCOSE: 275 MG/DL (ref 74–99)
METER GLUCOSE: 288 MG/DL (ref 74–99)
METER GLUCOSE: 314 MG/DL (ref 74–99)
METER GLUCOSE: 350 MG/DL (ref 74–99)

## 2020-08-28 PROCEDURE — 6370000000 HC RX 637 (ALT 250 FOR IP): Performed by: UROLOGY

## 2020-08-28 PROCEDURE — 2580000003 HC RX 258: Performed by: NEUROLOGICAL SURGERY

## 2020-08-28 PROCEDURE — 2580000003 HC RX 258: Performed by: PHYSICIAN ASSISTANT

## 2020-08-28 PROCEDURE — 6370000000 HC RX 637 (ALT 250 FOR IP): Performed by: FAMILY MEDICINE

## 2020-08-28 PROCEDURE — 6370000000 HC RX 637 (ALT 250 FOR IP): Performed by: PHYSICIAN ASSISTANT

## 2020-08-28 PROCEDURE — 97530 THERAPEUTIC ACTIVITIES: CPT

## 2020-08-28 PROCEDURE — 6370000000 HC RX 637 (ALT 250 FOR IP): Performed by: NEUROLOGICAL SURGERY

## 2020-08-28 PROCEDURE — 6370000000 HC RX 637 (ALT 250 FOR IP): Performed by: CLINICAL NURSE SPECIALIST

## 2020-08-28 PROCEDURE — 96376 TX/PRO/DX INJ SAME DRUG ADON: CPT

## 2020-08-28 PROCEDURE — 6360000002 HC RX W HCPCS: Performed by: PHYSICIAN ASSISTANT

## 2020-08-28 PROCEDURE — 97535 SELF CARE MNGMENT TRAINING: CPT

## 2020-08-28 PROCEDURE — 82962 GLUCOSE BLOOD TEST: CPT

## 2020-08-28 PROCEDURE — G0378 HOSPITAL OBSERVATION PER HR: HCPCS

## 2020-08-28 PROCEDURE — 1280000000 HC REHAB R&B

## 2020-08-28 PROCEDURE — 97110 THERAPEUTIC EXERCISES: CPT

## 2020-08-28 RX ORDER — MORPHINE SULFATE 2 MG/ML
2 INJECTION, SOLUTION INTRAMUSCULAR; INTRAVENOUS
Status: DISCONTINUED | OUTPATIENT
Start: 2020-08-28 | End: 2020-08-28

## 2020-08-28 RX ORDER — SENNA AND DOCUSATE SODIUM 50; 8.6 MG/1; MG/1
1 TABLET, FILM COATED ORAL 2 TIMES DAILY
Status: DISCONTINUED | OUTPATIENT
Start: 2020-08-28 | End: 2020-09-02 | Stop reason: HOSPADM

## 2020-08-28 RX ORDER — SUMATRIPTAN 50 MG/1
100 TABLET, FILM COATED ORAL DAILY PRN
Status: DISCONTINUED | OUTPATIENT
Start: 2020-08-28 | End: 2020-09-02 | Stop reason: HOSPADM

## 2020-08-28 RX ORDER — DILTIAZEM HYDROCHLORIDE 120 MG/1
120 CAPSULE, COATED, EXTENDED RELEASE ORAL DAILY
Status: DISCONTINUED | OUTPATIENT
Start: 2020-08-29 | End: 2020-09-02 | Stop reason: HOSPADM

## 2020-08-28 RX ORDER — SENNA PLUS 8.6 MG/1
1 TABLET ORAL DAILY PRN
Status: DISCONTINUED | OUTPATIENT
Start: 2020-08-28 | End: 2020-09-02 | Stop reason: HOSPADM

## 2020-08-28 RX ORDER — SODIUM PHOSPHATE, DIBASIC AND SODIUM PHOSPHATE, MONOBASIC 7; 19 G/133ML; G/133ML
1 ENEMA RECTAL DAILY PRN
Status: DISCONTINUED | OUTPATIENT
Start: 2020-08-28 | End: 2020-09-02 | Stop reason: HOSPADM

## 2020-08-28 RX ORDER — MORPHINE SULFATE 4 MG/ML
4 INJECTION, SOLUTION INTRAMUSCULAR; INTRAVENOUS
Status: DISCONTINUED | OUTPATIENT
Start: 2020-08-28 | End: 2020-08-28

## 2020-08-28 RX ORDER — GABAPENTIN 400 MG/1
400 CAPSULE ORAL 3 TIMES DAILY
Status: DISCONTINUED | OUTPATIENT
Start: 2020-08-28 | End: 2020-09-02 | Stop reason: HOSPADM

## 2020-08-28 RX ORDER — BUMETANIDE 1 MG/1
2 TABLET ORAL 2 TIMES DAILY
Status: DISCONTINUED | OUTPATIENT
Start: 2020-08-28 | End: 2020-09-02 | Stop reason: HOSPADM

## 2020-08-28 RX ORDER — NICOTINE POLACRILEX 4 MG
15 LOZENGE BUCCAL PRN
Status: DISCONTINUED | OUTPATIENT
Start: 2020-08-28 | End: 2020-09-02 | Stop reason: HOSPADM

## 2020-08-28 RX ORDER — SODIUM CHLORIDE 9 MG/ML
INJECTION, SOLUTION INTRAVENOUS CONTINUOUS
Status: DISCONTINUED | OUTPATIENT
Start: 2020-08-28 | End: 2020-08-28

## 2020-08-28 RX ORDER — ONDANSETRON 2 MG/ML
4 INJECTION INTRAMUSCULAR; INTRAVENOUS EVERY 6 HOURS PRN
Status: DISCONTINUED | OUTPATIENT
Start: 2020-08-28 | End: 2020-08-28

## 2020-08-28 RX ORDER — ESCITALOPRAM OXALATE 10 MG/1
20 TABLET ORAL DAILY
Status: DISCONTINUED | OUTPATIENT
Start: 2020-08-29 | End: 2020-09-02 | Stop reason: HOSPADM

## 2020-08-28 RX ORDER — ATORVASTATIN CALCIUM 40 MG/1
40 TABLET, FILM COATED ORAL DAILY
Status: DISCONTINUED | OUTPATIENT
Start: 2020-08-28 | End: 2020-09-02 | Stop reason: HOSPADM

## 2020-08-28 RX ORDER — SODIUM CHLORIDE 0.9 % (FLUSH) 0.9 %
10 SYRINGE (ML) INJECTION PRN
Status: DISCONTINUED | OUTPATIENT
Start: 2020-08-28 | End: 2020-09-02 | Stop reason: HOSPADM

## 2020-08-28 RX ORDER — SODIUM CHLORIDE 0.9 % (FLUSH) 0.9 %
10 SYRINGE (ML) INJECTION EVERY 12 HOURS SCHEDULED
Status: DISCONTINUED | OUTPATIENT
Start: 2020-08-28 | End: 2020-09-01

## 2020-08-28 RX ORDER — POTASSIUM CHLORIDE 20 MEQ/1
20 TABLET, EXTENDED RELEASE ORAL DAILY
Status: DISCONTINUED | OUTPATIENT
Start: 2020-08-29 | End: 2020-09-02 | Stop reason: HOSPADM

## 2020-08-28 RX ORDER — CYCLOBENZAPRINE HCL 10 MG
10 TABLET ORAL 3 TIMES DAILY PRN
Status: DISCONTINUED | OUTPATIENT
Start: 2020-08-28 | End: 2020-09-02 | Stop reason: HOSPADM

## 2020-08-28 RX ORDER — DEXTROSE MONOHYDRATE 50 MG/ML
100 INJECTION, SOLUTION INTRAVENOUS PRN
Status: DISCONTINUED | OUTPATIENT
Start: 2020-08-28 | End: 2020-09-02 | Stop reason: HOSPADM

## 2020-08-28 RX ORDER — OXYCODONE HYDROCHLORIDE 10 MG/1
10 TABLET ORAL EVERY 6 HOURS PRN
Status: DISCONTINUED | OUTPATIENT
Start: 2020-08-28 | End: 2020-09-02 | Stop reason: HOSPADM

## 2020-08-28 RX ORDER — CYCLOBENZAPRINE HCL 10 MG
10 TABLET ORAL ONCE
Status: COMPLETED | OUTPATIENT
Start: 2020-08-28 | End: 2020-08-28

## 2020-08-28 RX ORDER — OXYCODONE HYDROCHLORIDE 5 MG/1
5 TABLET ORAL EVERY 6 HOURS PRN
Status: DISCONTINUED | OUTPATIENT
Start: 2020-08-28 | End: 2020-09-02 | Stop reason: HOSPADM

## 2020-08-28 RX ORDER — DEXAMETHASONE SODIUM PHOSPHATE 4 MG/ML
4 INJECTION, SOLUTION INTRA-ARTICULAR; INTRALESIONAL; INTRAMUSCULAR; INTRAVENOUS; SOFT TISSUE EVERY 6 HOURS
Status: COMPLETED | OUTPATIENT
Start: 2020-08-28 | End: 2020-08-29

## 2020-08-28 RX ORDER — LOSARTAN POTASSIUM 50 MG/1
50 TABLET ORAL DAILY
Status: DISCONTINUED | OUTPATIENT
Start: 2020-08-29 | End: 2020-09-02 | Stop reason: HOSPADM

## 2020-08-28 RX ORDER — BUTALBITAL, ACETAMINOPHEN AND CAFFEINE 50; 325; 40 MG/1; MG/1; MG/1
1 TABLET ORAL EVERY 4 HOURS PRN
Status: DISCONTINUED | OUTPATIENT
Start: 2020-08-28 | End: 2020-09-02 | Stop reason: HOSPADM

## 2020-08-28 RX ORDER — PROMETHAZINE HYDROCHLORIDE 25 MG/1
12.5 TABLET ORAL EVERY 6 HOURS PRN
Status: DISCONTINUED | OUTPATIENT
Start: 2020-08-28 | End: 2020-09-02 | Stop reason: HOSPADM

## 2020-08-28 RX ORDER — DEXTROSE MONOHYDRATE 25 G/50ML
12.5 INJECTION, SOLUTION INTRAVENOUS PRN
Status: DISCONTINUED | OUTPATIENT
Start: 2020-08-28 | End: 2020-09-02 | Stop reason: HOSPADM

## 2020-08-28 RX ORDER — POLYETHYLENE GLYCOL 3350 17 G/17G
17 POWDER, FOR SOLUTION ORAL DAILY
Status: DISCONTINUED | OUTPATIENT
Start: 2020-08-29 | End: 2020-09-02 | Stop reason: HOSPADM

## 2020-08-28 RX ORDER — MESALAMINE 400 MG/1
800 CAPSULE, DELAYED RELEASE ORAL 3 TIMES DAILY
Status: DISCONTINUED | OUTPATIENT
Start: 2020-08-28 | End: 2020-09-02 | Stop reason: HOSPADM

## 2020-08-28 RX ADMIN — DEXAMETHASONE SODIUM PHOSPHATE 4 MG: 4 INJECTION, SOLUTION INTRAMUSCULAR; INTRAVENOUS at 22:37

## 2020-08-28 RX ADMIN — BISACODYL 5 MG: 5 TABLET, COATED ORAL at 08:51

## 2020-08-28 RX ADMIN — ESCITALOPRAM 20 MG: 10 TABLET, FILM COATED ORAL at 06:33

## 2020-08-28 RX ADMIN — POTASSIUM CHLORIDE 20 MEQ: 1500 TABLET, EXTENDED RELEASE ORAL at 08:51

## 2020-08-28 RX ADMIN — INSULIN LISPRO 6 UNITS: 100 INJECTION, SOLUTION INTRAVENOUS; SUBCUTANEOUS at 08:52

## 2020-08-28 RX ADMIN — DEXAMETHASONE SODIUM PHOSPHATE 4 MG: 4 INJECTION, SOLUTION INTRAMUSCULAR; INTRAVENOUS at 10:00

## 2020-08-28 RX ADMIN — MESALAMINE 800 MG: 400 CAPSULE, DELAYED RELEASE ORAL at 13:42

## 2020-08-28 RX ADMIN — OXYCODONE HYDROCHLORIDE 10 MG: 10 TABLET ORAL at 04:35

## 2020-08-28 RX ADMIN — DEXAMETHASONE SODIUM PHOSPHATE 4 MG: 4 INJECTION, SOLUTION INTRAMUSCULAR; INTRAVENOUS at 16:59

## 2020-08-28 RX ADMIN — DILTIAZEM HYDROCHLORIDE 120 MG: 120 CAPSULE, COATED, EXTENDED RELEASE ORAL at 08:52

## 2020-08-28 RX ADMIN — DESMOPRESSIN ACETATE 40 MG: 0.2 TABLET ORAL at 08:51

## 2020-08-28 RX ADMIN — GABAPENTIN 400 MG: 400 CAPSULE ORAL at 13:43

## 2020-08-28 RX ADMIN — DOCUSATE SODIUM 50 MG AND SENNOSIDES 8.6 MG 1 TABLET: 8.6; 5 TABLET, FILM COATED ORAL at 08:51

## 2020-08-28 RX ADMIN — OXYCODONE HYDROCHLORIDE 10 MG: 10 TABLET ORAL at 17:10

## 2020-08-28 RX ADMIN — GABAPENTIN 400 MG: 400 CAPSULE ORAL at 08:51

## 2020-08-28 RX ADMIN — MESALAMINE 800 MG: 400 CAPSULE, DELAYED RELEASE ORAL at 22:38

## 2020-08-28 RX ADMIN — GABAPENTIN 400 MG: 400 CAPSULE ORAL at 22:38

## 2020-08-28 RX ADMIN — Medication 10 ML: at 08:51

## 2020-08-28 RX ADMIN — CYCLOBENZAPRINE HYDROCHLORIDE 10 MG: 10 TABLET, FILM COATED ORAL at 23:12

## 2020-08-28 RX ADMIN — LOSARTAN POTASSIUM 50 MG: 50 TABLET, FILM COATED ORAL at 08:51

## 2020-08-28 RX ADMIN — DEXAMETHASONE SODIUM PHOSPHATE 4 MG: 4 INJECTION, SOLUTION INTRAMUSCULAR; INTRAVENOUS at 03:50

## 2020-08-28 RX ADMIN — INSULIN LISPRO 5 UNITS: 100 INJECTION, SOLUTION INTRAVENOUS; SUBCUTANEOUS at 22:50

## 2020-08-28 RX ADMIN — SODIUM CHLORIDE, PRESERVATIVE FREE 10 ML: 5 INJECTION INTRAVENOUS at 03:51

## 2020-08-28 RX ADMIN — POLYETHYLENE GLYCOL 3350 17 G: 17 POWDER, FOR SOLUTION ORAL at 08:51

## 2020-08-28 RX ADMIN — VANCOMYCIN HYDROCHLORIDE 1.5 G: 10 INJECTION, POWDER, LYOPHILIZED, FOR SOLUTION INTRAVENOUS at 18:31

## 2020-08-28 RX ADMIN — INSULIN LISPRO 8 UNITS: 100 INJECTION, SOLUTION INTRAVENOUS; SUBCUTANEOUS at 11:36

## 2020-08-28 RX ADMIN — INSULIN LISPRO 6 UNITS: 100 INJECTION, SOLUTION INTRAVENOUS; SUBCUTANEOUS at 17:00

## 2020-08-28 RX ADMIN — MESALAMINE 800 MG: 400 CAPSULE, DELAYED RELEASE ORAL at 08:51

## 2020-08-28 RX ADMIN — BUMETANIDE 2 MG: 1 TABLET ORAL at 08:51

## 2020-08-28 ASSESSMENT — PAIN DESCRIPTION - ONSET: ONSET: ON-GOING

## 2020-08-28 ASSESSMENT — PAIN SCALES - GENERAL
PAINLEVEL_OUTOF10: 7
PAINLEVEL_OUTOF10: 5
PAINLEVEL_OUTOF10: 7

## 2020-08-28 ASSESSMENT — PAIN DESCRIPTION - FREQUENCY: FREQUENCY: INTERMITTENT

## 2020-08-28 ASSESSMENT — PAIN DESCRIPTION - ORIENTATION: ORIENTATION: ANTERIOR

## 2020-08-28 ASSESSMENT — PAIN DESCRIPTION - PROGRESSION: CLINICAL_PROGRESSION: GRADUALLY WORSENING

## 2020-08-28 ASSESSMENT — PAIN - FUNCTIONAL ASSESSMENT: PAIN_FUNCTIONAL_ASSESSMENT: PREVENTS OR INTERFERES SOME ACTIVE ACTIVITIES AND ADLS

## 2020-08-28 ASSESSMENT — PAIN DESCRIPTION - PAIN TYPE: TYPE: SURGICAL PAIN

## 2020-08-28 ASSESSMENT — PAIN DESCRIPTION - LOCATION: LOCATION: NECK

## 2020-08-28 ASSESSMENT — PAIN DESCRIPTION - DESCRIPTORS: DESCRIPTORS: ACHING;DISCOMFORT;DULL

## 2020-08-28 NOTE — PLAN OF CARE
Problem: Nutritional:  Goal: Consumption of food in small portions  Description: Consumption of food in small portions  8/28/2020 0059 by Rocky Almaguer RN  Outcome: Met This Shift  8/27/2020 1517 by Polo Bryson RN  Outcome: Met This Shift

## 2020-08-28 NOTE — PROGRESS NOTES
Precert approved by payer source. Admit today to bed 5515B.  Orders in [facility]

## 2020-08-28 NOTE — LETTER
PORTABLE PATIENT PROFILE  Patrick Figueroa  1990/3260-Y    MEDICAL DIAGNOSIS/CONDITION:   Patient Active Problem List   Diagnosis    L-S radiculopathy    Migraine without aura and without status migrainosus, not intractable    Essential hypertension    Type 2 diabetes mellitus with renal complication (HCC)    Stage 3 chronic kidney disease ([facility] [address]    HLD (hyperlipidemia)    Closed displaced fracture of head of left radius    Hx-TIA (transient ischemic attack)    Cervical spondylosis    Frequent falls    Cervical herniated disc    Cervical myelopathy ([facility] [address]       INSURANCE INFORMATION:  Payor: Rajesh Johnson /  /  /     ADVANCED DIRECTIVES:   Advance Directives (For Healthcare)  Healthcare Directive: No, patient does not have an advance directive for healthcare treatment  [unfilled]     EMERGENCY CONTACT:       RISK FACTORS:   Social History     Tobacco Use    Smoking status: Never Smoker    Smokeless tobacco: Never Used   Substance Use Topics    Alcohol use: Yes     Alcohol/week: 1.0 standard drinks     Types: 1 Standard drinks or equivalent per week     Frequency: Monthly or less     Comment: rare       ALLERGIES:  Allergies   Allergen Reactions    Cephalosporins Hives     Plus reaction    Codeine Hives    Penicillins Hives       IMMUNIZATIONS:  Immunization History   Administered Date(s) Administered    Influenza Vaccine, unspecified formulation 07/21/2016    Tdap (Boostrix, Adacel) 08/14/2019       SWALLOWING:   Difficulty Chewing or Swallowing Food: Yes (Comment)    VISION AND HEARING:   Sensory Problems  Visual impairment: Glasses(reading)    PHYSICIANS INVOLVED WITH CARE:    Fazal Prado MD  No ref.  provider found  MD Fazal Cee MD

## 2020-08-28 NOTE — CARE COORDINATION
8/28, LEE spoke with patient at bedside and discussed discharge plan which is [facility]. Patient feels she needs rehab due to current condition and  works our of [place]. MARIBEL choice would be [facility] at [facility]. LEE was updated by CM and nursing on patient going to [address] [place] [address]. SW to follow for any further needs.     Hector Garner, [facility]

## 2020-08-28 NOTE — DISCHARGE INSTR - COC
Continuity of Care Form    Patient Name: Elsy Stanton   :  1965  MRN:  [id number]    Admit date:  2020  Discharge date:  2020      Code Status Order: Full Code   Advance Directives:   [facility] Documentation     Date/Time Healthcare Directive Type of Healthcare Directive Copy in [facility] [address] Agent's Name Healthcare Agent's Phone Number    20 1310  No, patient does not have an advance directive for healthcare treatment -- -- -- -- --    20 1141  No, patient does not have an advance directive for healthcare treatment -- -- -- -- --          Admitting Physician:  Kaitlin Smart MD  PCP: Domitila Monson MD    [facility]charging [facility] Unit/Room#: 1026/3264-E  Discharging Unit Phone Number: [phone]    Emergency Contact:   Extended Emergency Contact Information  Primary Emergency Contact: Jackson varmae  [place] Phone: [phone]  Relation: Child  Secondary Emergency Contact: Michael Saucedo  Address: [address] Phone: [phone]  Mobile Phone: [phone]  Relation: Child   needed?  No    Past Surgical History:  Past Surgical History:   Procedure Laterality Date    CARDIAC CATHETERIZATION  2014    Dr. Stallings Bowels EF 65%    CARDIAC CATHETERIZATION      [address] N/A 2020    C4-C5, C5-C6, C6-C7 ANTERIOR CERVICAL DISCECTOMY FUSION --ARM, REG BED  HORSE SHOE, PLATES, SCREWS, GLOBUS performed by Kaitlin Smart MD at [address]  [address]    COLONOSCOPY  2005    yearly   [address]  2013    ankle right   Osmani Dellen       Immunization History:   Immunization History   Administered Date(s) Administered    Influenza Vaccine, unspecified formulation 2016    Tdap (Boostrix, Adacel) 2019       Active Problems:  Patient Active Problem List   Diagnosis Code    L-S radiculopathy M54.17    Migraine without aura and without status migrainosus, not intractable G43.009    Essential hypertension I10    Type 2 diabetes mellitus with renal complication (HCC) P60.08    Stage 3 chronic kidney disease (HCC) N18.3    HLD (hyperlipidemia) E78.5    Closed displaced fracture of head of left radius S52.122A    Hx-TIA (transient ischemic attack) Z86.73    Cervical spondylosis M47.812    Frequent falls R29.6    Cervical herniated disc M50.20       Isolation/Infection:   Isolation          No Isolation        Patient Infection Status     Infection Onset Added Last Indicated Last Indicated By Review Planned Expiration Resolved Resolved By    None active    Resolved    COVID-19 Rule Out 08/21/20 08/21/20 08/21/20 Covid-19 Ambulatory (Ordered)   08/24/20 Rule-Out Test Resulted          Nurse Assessment:  Last Vital Signs: /86   Pulse 81   Temp 97.6 °F (36.4 °C)   Resp 16   Ht 5' 3\" (1.6 m)   Wt 189 lb (85.7 kg)   SpO2 95%   BMI 33.48 kg/m²     Last documented pain score (0-10 scale): Pain Level: 5  Last Weight:   Wt Readings from Last 1 Encounters:   08/24/20 189 lb (85.7 kg)     Mental Status:  oriented, alert, coherent, logical, thought processes intact and able to concentrate and follow conversation    IV Access:  - None    Nursing Mobility/ADLs:  Walking   Assisted  Transfer  Assisted  Bathing  Assisted  Dressing  Assisted  Toileting  Assisted  Feeding  Independent  Med Admin  Assisted  Med Delivery   whole    Wound Care Documentation and Therapy:        Elimination:  Continence:   · Bowel: Yes  · Bladder: Yes  Urinary Catheter: None   Colostomy/Ileostomy/Ileal Conduit: No       Date of Last BM: 8/27/2020    Intake/Output Summary (Last 24 hours) at 8/28/2020 1434  Last data filed at 8/28/2020 0730  Gross per 24 hour   Intake 1170 ml   Output --   Net 1170 ml     I/O last 3 completed shifts: In: 1890 [P.O.:1520; I.V.:10]  Out: -     Safety Concerns:      At Risk for Falls    Impairments/Disabilities:      None    Nutrition Therapy:  Current Nutrition Therapy:   - Oral Diet:  Carb Control 4 carbs/meal (1800kcals/day) and Dental Soft    Routes of Feeding: Oral  Liquids: No Restrictions  Daily Fluid Restriction: no  Last Modified Barium Swallow with Video (Video Swallowing Test): not done    Treatments at the Time of Hospital Discharge:   Respiratory Treatments: albuterol sulfate  (90 Base) MCG/ACT inhaler 1 puff  :  Dose 1 puff  :  Inhalation  :  EVERY 4 HOURS PRN  :  Wheezing  Oxygen Therapy:  is not on home oxygen therapy.   Ventilator:    - No ventilator support    Rehab Therapies: Physical Therapy, Occupational Therapy and Orthotics/Prosthetics  Weight Bearing Status/Restrictions: No weight bearing restirctions  Other Medical Equipment (for information only, NOT a DME order):  walker, bedside commode, hospital bed and braces Custom cervical collar   Other Treatments: incentive spirometer     Patient's personal belongings (please select all that are sent with patient):  Matilde    RN SIGNATURE:  Electronically signed by Erickson Goldsmith RN on 8/28/20 at 2:40 PM EDT    CASE MANAGEMENT/SOCIAL WORK SECTION    Inpatient Status Date: ***    Readmission Risk Assessment Score:  Readmission Risk              Risk of Unplanned Readmission:        0           Discharging to Facility/ Agency   · Name:   · Address:  · Phone:  · Fax:    Dialysis Facility (if applicable)   · Name:  · Address:  · Dialysis Schedule:  · Phone:  · Fax:    / signature: {Esignature:[id number]}    PHYSICIAN SECTION    Prognosis: {Prognosis:1274096054}    Condition at Discharge: [address] [place] [address] Patient Condition:[id number]}    Rehab Potential (if transferring to Rehab): {Prognosis:1316785865}    Recommended Labs or Other Treatments After Discharge: ***    Physician Certification: I certify the above information and transfer of Rodolfo Blunt  is necessary for the continuing treatment of the diagnosis

## 2020-08-28 NOTE — PROGRESS NOTES
Dr. Nancy Kebede and ROBBIE Roche on floor and evaluated patient's increased neck swelling. Per neurosurgery team we will just continue to monitor patient.   Electronically signed by Lili Adams RN on 8/28/2020 at 12:19 PM

## 2020-08-28 NOTE — PROGRESS NOTES
OT BEDSIDE TREATMENT NOTE      Date:2020  Patient Name: Kyaw Bahena  MRN: [id number]  : 1965  Room: [address] [place] [address]     Per OT Eval:    Evaluating OT: Kelton Collet. [place], OTR/L   License #  [id number]      AM-PAC Daily Activity Raw Score:      Recommended Adaptive Equipment:  TBD      Diagnosis: HERNIATED DISC     Surgery: 20: C4-C5, C5-C6, C6-C7 ANTERIOR CERVICAL DISCECTOMY FUSION --ARM, REG BED  HORSE SHOE, PLATES, SCREWS   Pertinent Medical History:   Past Medical History        Past Medical History:   Diagnosis Date    Allergic      PCN, Most Pain medications,    Anemia     Anxiety     Arm fracture, left      Arthritis      Asthma     Cerebral artery occlusion with cerebral infarction ([facility] [address]      Chronic kidney disease      insufficincy    [facility]      Cro[facility] [address] [facility]     Crohn's disease ([facility] [address]      Diabetes mellitus ([facility] [address]      Essential hypertension 2017    Hyperlipidemia      L-S radiculopathy      Obesity     Raynaud disease      Sleep apnea       wears CPAP    Tuberculosis          Precautions:  Falls, cervical prec., collar, FWB     Home Living: Pt lives alone (states neighbor can assist but works) in a multi-level home with 2+1 lg. steps to enter and no HR.   Once inside, 7-8 steps with HR up to B&B level  Bathroom setup: tub/shower   Equipment owned: sp cane, st. walker     Prior Level of Function: Ind. with ADLs , Ind. with IADLs; ambulated cane vs. walker  Driving: friends drive  Occupation: was Nurse's aide prior to      Pain Level: Pt did not complain of pain this session  Cognition: A&O: 4/4; Follows 2 step directions              Memory:  good              Sequencing:  good              Problem solving:  fair              Judgement/safety:  fair                Functional Assessment:    Initial Eval Status  Date: 2020 Treatment Status  Date: 20 Short Term Goals = Long Term Goals  Treatment frequency: 3-5 days   Feeding Stand by Assist, set up   SBA Mod I    Grooming Min/Moderate Assist  SBA  Pt washed face, brushed teeth standing at sink Modified [place]    UB Dressing Min Assist on EOB to james prather SBA  [facility] gown seated upright in chair Modified [place]    LB Dressing Maximal Assist with B socks seated in chair SBA  [facility] socks seated upright in chair using cross over technique Modified [place]    Bathing Moderate Assist with sim. task SBA  Pt completed sponge bathing task seated/stanidng in chair, with SBA for safety to stand and wash of buttocks, with cueing to follow of precautions Modified [place]    Toileting NT, kartik cath present DNT  SBA per previous level Modified [place]    Bed Mobility  Supine to sit: TBA  Sit to supine:TBA     Pt. received in recliner, states she sometimes sleeps in recliner at home d/t sleep apnea DNT  SBA per previous level Supine to sit: Modified [place]   Sit to supine: Modified [place]    Functional Transfers Minimal Assist with sit <> stand, SPT with ww  SBA  Sit to Stand  Stand to Sit    Cuing for safety Modified [place]    Functional Mobility Minimal Assist with ww short household distance, ~30' total SBA  Pt ambulated in torres and within room with w.w. Modified [place]    Balance Sitting:     Static:  Sup    Dynamic:SBA  Standing: Min a  Sitting:  Ind    Standing:  SBA     Activity Tolerance Fair with lt. Ax.  3L O2  spO2 & HR remained WFL throughout  Fair     Good with lt./mod. ax. Visual/  Perceptual Glasses: no          Safety Awareness fair                     good      Comments: Upon arrival pt seated upright in chair, agreeable to therapy. Pt compelted of transfers, functional mobility and ADL tasks this session.  Pt educated on precautions to follow, safety and hand placement with transfers, safety with functional mobility and walker management, techniques to

## 2020-08-28 NOTE — PROGRESS NOTES
Physical Therapy    Name: Tala De La Cruz  : 1965  DWU: [id number]     Referring Rosendo Greer MD     Date of Service: 2020     Evaluating PT: Pratima Mahajan, PT   Room #:  7846/1662-N  Diagnosis: COVID-19 [U07.1]   Preop testing [Z01.818]  Cervical herniated disc [M50.20]     PMHx/PSHx:   has a past medical history of Allergic, Anemia, Anxiety, Arm fracture, left, Arthritis, Asthma, Cerebral artery occlusion with cerebral infarction ([address] Chronic kidney disease, COPD (chronic obstructive pulmonary disease) ([facility] [address] Crohn disease ([facility] [address] Crohn's disease ([facility] [address] Diabetes mellitus ([facility] [address] Essential hypertension, Hyperlipidemia, L-S radiculopathy, Obesity, Raynaud disease, Sleep apnea, and Tuberculosis. Procedure/Surgery:  20 C4-C5, C5-C6, C6-C7 ANTERIOR CERVICAL DISCECTOMY FUSION   Precautions:  Falls,  , FWB (full weight bearing), Spinal precautions and Cervical Collar  Equipment Needs: Front wheeled walker.      SUBJECTIVE:     Patient lives alone  in a multi level home with numerous steps   with 3 steps to enter without Rail States friends/neighbors will assist upon D/C.  Patient ambulated with cane  PTA. States she recently has had multiple falls at home.  Equipment owned: Cane and Standard Walker,       OBJECTIVE:    Initial Evaluation  Date: 20 Treatment  20 Short Term/ Long Term   Goals   AM-PAC 6 Clicks   79/92     Was pt agreeable to Eval/treatment? yes  yes patient's neighbor in the room      Does pt have pain? Yes cervical area.        Bed Mobility  Rolling: NT  Supine to sit: NT  Sit to supine: NT  Scooting: NT  In chair pre and post session.   NT patient up in bedside chair pre and post session.  Rolling: Ind  Supine to sit: Ind  Sit to supine: Ind  Scooting: Ind   Transfers Sit to stand: Min to fww  Stand to sit: Min  Stand pivot: Min with fww Sit to stand: Sup to fww  Stand to sit: Sup  Stand pivot: Sup with fww  Sit to stand:  Mod Ind  Stand to sit: Mod Ind  Stand and phone within reach,  all lines and tubes intact, nursing notified. This patient can benefit from the continuation of skilled PT possibly in rehab setting to maximize functional level and return to [facility].      Treatment:  Patient practiced and was instructed in the following treatment:    · Assistive device training - pt educated on using [facility] during gait, [facility] approximation/negotiation, and hand placement during sit<>stand to [facility]  · STS and transfer training - educated on hand/foot placement, safety, and sequencing during STS and pivot transfers using assistive device  · Gait training - verbal cues for [facility] approximation/negotiation, upright posture, and safety during 90 and 180 degree turns during gait. Gait also completed without device. · Education in spinal precautions.     Pt's/ family goals   1. Home when ready. Concerned about returning home with multiple steps and living Ind. .      Patient and or family understand(s) diagnosis, prognosis, and plan of care. yes     PLAN:     PT care will be provided in accordance with the objectives noted above. Exercises and functional mobility practice will be used as well as appropriate assistive devices or modalities to obtain goals. Patient and family education will also be administered as needed.     Frequency of treatments: 2-5x/week x 1-2 weeks.     Time AY  0510  Time AND  4015  Time KU  7692  Time X  9850   Total Treatment Time 25 minutes      Evaluation Time includes thorough review of current medical information, gathering information on past medical history/social history and prior level of function, completion of standardized testing/informal observation of tasks, assessment of data and education on plan of care and goals.     CPT codes:     []??? PT Re-evaluation 03403  []??? Gait training [id number] - minutes  []? ?? Manual therapy 04625 - minutes  [x]? ?? Therapeutic activities 10583 81 [id number]  []? ?? Therapeutic exercises 09045 - minutes  []? ?? Neuromuscular reeducation 93018 - minutes         Elisabeth Salas, PT

## 2020-08-28 NOTE — PLAN OF CARE
Problem: Discharge Planning:  Goal: Discharged to appropriate level of care  Description: Discharged to appropriate level of care  Outcome: Met This Shift     Problem: Infection - Surgical Site:  Goal: Will show no infection signs and symptoms  Description: Will show no infection signs and symptoms  Outcome: Met This Shift     Problem: Mobility - Impaired:  Goal: Mobility will improve to maximum level  Description: Mobility will improve to maximum level  Outcome: Met This Shift  Goal: Able to ambulate independently  Description: Able to ambulate independently  Outcome: Met This Shift     Problem: Pain:  Goal: Pain level will decrease  Description: Pain level will decrease  8/28/2020 1039 by Blane Alexander RN  Outcome: Met This Shift  8/28/2020 0057 by Kristy Zee RN  Outcome: Met This Shift  Goal: Control of acute pain  Description: Control of acute pain  Outcome: Met This Shift  Goal: Control of chronic pain  Description: Control of chronic pain  Outcome: Met This Shift     Problem: Sensory Perception - Impaired:  Goal: Sensory function intact, lower extremity  Description: Sensory function intact, lower extremity  Outcome: Met This Shift  Goal: Circulatory function of lower extremities is within specified parameters  Description: Circulatory function of lower extremities is within specified parameters  Outcome: Met This Shift     Problem: Urinary Retention:  Goal: Urinary elimination within specified parameters  Description: Urinary elimination within specified parameters  Outcome: Met This Shift     Problem: Venous Thromboembolism:  Goal: Will show no signs or symptoms of venous thromboembolism  Description: Will show no signs or symptoms of venous thromboembolism  Outcome: Met This Shift     Problem: Nutritional:  Goal: Consumption of food in small portions  Description: Consumption of food in small portions  8/28/2020 1039 by Blane Alexander RN  Outcome: Met This Shift  8/28/2020 0059 by Kristy Zee RN  Outcome: Met This Shift     Problem: Respiratory:  Goal: Will remain free from infection  Description: Will remain free from infection  Outcome: Met This Shift  Goal: Absence of aspiration  Description: Absence of aspiration  Outcome: Met This Shift

## 2020-08-28 NOTE — PROGRESS NOTES
ED called to check on status of RN being able to place new IV site due to failed attempts. Unable to place site at this time. CVICU called to place site and will be up between 9 and 10 pm. Will continue to monitor.

## 2020-08-28 NOTE — PLAN OF CARE
Problem: Pain:  Goal: Pain level will decrease  Description: Pain level will decrease  Outcome: Met This Shift Detail Level: Zone

## 2020-08-28 NOTE — PROGRESS NOTES
Department of Neurosurgery  Progress Note    CHIEF COMPLAINT: s/p C4-C7 ACDF 8/24    SUBJECTIVE: throat soreness improving. Amanda op site pain. No new issues overnight. REVIEW OF SYSTEMS :  Constitutional: Negative for chills and fever. Neurological: Negative for dizziness, tremors and speech change. OBJECTIVE:   VITALS:  /75   Pulse 64   Temp 97.7 °F (36.5 °C) (Temporal)   Resp 18   Ht 5' 3\" (1.6 m)   Wt 189 lb (85.7 kg)   SpO2 95%   BMI 33.48 kg/m²     PHYSICAL:  Constitutional: Appears well-nourished. Head: Normocephalic and atraumatic. Eyes: EOM are normal. Pupils are equal, round, and reactive to light. Neck: Normal range of motion. No tracheal deviation present. Cardiovascular: Normal rate. Pulmonary/Chest: No stridor. Abdominal: No distension. Neurological:   Alert and oriented x3  Face symmetric  Moving all extremities well  Sensation intact to light touch   Skin: Skin is warm and dry.    Psychiatric: Thought content normal.       DATA:  CBC:   Lab Results   Component Value Date    WBC 8.1 08/18/2020    RBC 4.56 08/18/2020    HGB 13.3 08/18/2020    HCT 40.7 08/18/2020    MCV 89.3 08/18/2020    MCH 29.2 08/18/2020    MCHC 32.7 08/18/2020    RDW 13.2 08/18/2020     08/18/2020    MPV 10.8 08/18/2020     BMP:    Lab Results   Component Value Date     08/18/2020    K 3.7 08/18/2020     08/18/2020    CO2 27 08/18/2020    BUN 15 08/18/2020    LABALBU 4.5 04/29/2019    LABALBU 4.2 01/15/2012    CREATININE 0.9 08/18/2020    CALCIUM 9.8 08/18/2020    GFRAA >60 08/18/2020    LABGLOM >60 08/18/2020    GLUCOSE 174 08/18/2020    GLUCOSE 89 01/15/2012     PT/INR:    Lab Results   Component Value Date    PROTIME 10.7 08/18/2020    INR 1.0 08/18/2020     PTT:    Lab Results   Component Value Date    APTT 31.5 11/15/2016   [APTT}    Current Inpatient Medications  Current Facility-Administered Medications: dexamethasone (DECADRON) injection 4 mg, 4 mg, Intravenous, Q6H  insulin lispro (HUMALOG) injection vial 0-12 Units, 0-12 Units, Subcutaneous, TID WC  insulin lispro (HUMALOG) injection vial 0-6 Units, 0-6 Units, Subcutaneous, Nightly  oxyCODONE (ROXICODONE) immediate release tablet 5 mg, 5 mg, Oral, Q6H PRN **OR** oxyCODONE HCl (OXY-IR) immediate release tablet 10 mg, 10 mg, Oral, Q6H PRN  mesalamine (DELZICOL) delayed release capsule 800 mg, 800 mg, Oral, TID  atorvastatin (LIPITOR) tablet 40 mg, 40 mg, Oral, Daily  butalbital-acetaminophen-caffeine (FIORICET, ESGIC) per tablet 1 tablet, 1 tablet, Oral, Q4H PRN  cyclobenzaprine (FLEXERIL) tablet 10 mg, 10 mg, Oral, TID PRN  dilTIAZem (CARDIZEM CD) extended release capsule 120 mg, 120 mg, Oral, Daily  escitalopram (LEXAPRO) tablet 20 mg, 20 mg, Oral, Daily  gabapentin (NEURONTIN) capsule 400 mg, 400 mg, Oral, TID  losartan (COZAAR) tablet 50 mg, 50 mg, Oral, Daily  potassium chloride (KLOR-CON M) extended release tablet 20 mEq, 20 mEq, Oral, Daily  albuterol sulfate  (90 Base) MCG/ACT inhaler 1 puff, 1 puff, Inhalation, Q4H PRN  SUMAtriptan (IMITREX) tablet 100 mg, 100 mg, Oral, Daily PRN  sodium chloride flush 0.9 % injection 10 mL, 10 mL, Intravenous, 2 times per day  sodium chloride flush 0.9 % injection 10 mL, 10 mL, Intravenous, PRN  promethazine (PHENERGAN) tablet 12.5 mg, 12.5 mg, Oral, Q6H PRN **OR** ondansetron (ZOFRAN) injection 4 mg, 4 mg, Intravenous, Q6H PRN  0.9 % sodium chloride infusion, , Intravenous, Continuous  morphine (PF) injection 2 mg, 2 mg, Intravenous, Q2H PRN **OR** morphine sulfate (PF) injection 4 mg, 4 mg, Intravenous, Q2H PRN  polyethylene glycol (GLYCOLAX) packet 17 g, 17 g, Oral, Daily  bisacodyl (DULCOLAX) EC tablet 5 mg, 5 mg, Oral, Daily  sennosides-docusate sodium (SENOKOT-S) 8.6-50 MG tablet 1 tablet, 1 tablet, Oral, BID  magnesium hydroxide (MILK OF MAGNESIA) 400 MG/5ML suspension 30 mL, 30 mL, Oral, Daily PRN  fleet rectal enema 1 enema, 1 enema, Rectal, Daily PRN  benzocaine-menthol (CEPACOL SORE THROAT) lozenge 1 lozenge, 1 lozenge, Oral, Q2H PRN  glucose (GLUTOSE) 40 % oral gel 15 g, 15 g, Oral, PRN  dextrose 50 % IV solution, 12.5 g, Intravenous, PRN  glucagon (rDNA) injection 1 mg, 1 mg, Intramuscular, PRN  dextrose 5 % solution, 100 mL/hr, Intravenous, PRN  bumetanide (BUMEX) tablet 2 mg, 2 mg, Oral, BID    ASSESSMENT:   s/p C4-C7 ACDF 8/24    PLAN:  -Pain control  -PT/OT  -Cervical collar x 3 months  -PMR consult  -Follow up in neurosurgery clinic in 4 weeks with x-rays      Electronically signed by ROBBIE Rosales on 8/28/2020 at 11:38 AM

## 2020-08-28 NOTE — PROGRESS NOTES
Patient noted to be adjusting cervical collar independently and sliding neck down into collar. Patient educated on appropriate way to wear collar and advised not to adjust on own. Will continue to monitor and assess patient/collar placement.

## 2020-08-28 NOTE — PROGRESS NOTES
ED called to see if RN can come to floor to place IV due to last IV being removed. Per ED RN, someone will be up to place site. Will continue to monitor.

## 2020-08-29 LAB
ALBUMIN SERPL-MCNC: 3.4 G/DL (ref 3.5–5.2)
ALP BLD-CCNC: 70 U/L (ref 35–104)
ALT SERPL-CCNC: 18 U/L (ref 0–32)
ANION GAP SERPL CALCULATED.3IONS-SCNC: 13 MMOL/L (ref 7–16)
AST SERPL-CCNC: 10 U/L (ref 0–31)
BASOPHILS ABSOLUTE: 0.02 E9/L (ref 0–0.2)
BASOPHILS RELATIVE PERCENT: 0.1 % (ref 0–2)
BILIRUB SERPL-MCNC: 0.4 MG/DL (ref 0–1.2)
BUN BLDV-MCNC: 18 MG/DL (ref 6–20)
CALCIUM SERPL-MCNC: 9.5 MG/DL (ref 8.6–10.2)
CHLORIDE BLD-SCNC: 99 MMOL/L (ref 98–107)
CO2: 27 MMOL/L (ref 22–29)
CREAT SERPL-MCNC: 0.8 MG/DL (ref 0.5–1)
EOSINOPHILS ABSOLUTE: 0 E9/L (ref 0.05–0.5)
EOSINOPHILS RELATIVE PERCENT: 0 % (ref 0–6)
GFR AFRICAN AMERICAN: >60
GFR NON-AFRICAN AMERICAN: >60 ML/MIN/1.73
GLUCOSE BLD-MCNC: 270 MG/DL (ref 74–99)
HCT VFR BLD CALC: 39 % (ref 34–48)
HEMOGLOBIN: 13 G/DL (ref 11.5–15.5)
IMMATURE GRANULOCYTES #: 0.09 E9/L
IMMATURE GRANULOCYTES %: 0.5 % (ref 0–5)
INR BLD: 1.1
LYMPHOCYTES ABSOLUTE: 0.9 E9/L (ref 1.5–4)
LYMPHOCYTES RELATIVE PERCENT: 5 % (ref 20–42)
MCH RBC QN AUTO: 29.5 PG (ref 26–35)
MCHC RBC AUTO-ENTMCNC: 33.3 % (ref 32–34.5)
MCV RBC AUTO: 88.6 FL (ref 80–99.9)
METER GLUCOSE: 247 MG/DL (ref 74–99)
METER GLUCOSE: 299 MG/DL (ref 74–99)
METER GLUCOSE: 316 MG/DL (ref 74–99)
METER GLUCOSE: 392 MG/DL (ref 74–99)
MONOCYTES ABSOLUTE: 0.73 E9/L (ref 0.1–0.95)
MONOCYTES RELATIVE PERCENT: 4.1 % (ref 2–12)
NEUTROPHILS ABSOLUTE: 16.17 E9/L (ref 1.8–7.3)
NEUTROPHILS RELATIVE PERCENT: 90.3 % (ref 43–80)
PDW BLD-RTO: 13.2 FL (ref 11.5–15)
PLATELET # BLD: 256 E9/L (ref 130–450)
PMV BLD AUTO: 10.4 FL (ref 7–12)
POTASSIUM REFLEX MAGNESIUM: 4 MMOL/L (ref 3.5–5)
PROTHROMBIN TIME: 12.3 SEC (ref 9.3–12.4)
RBC # BLD: 4.4 E12/L (ref 3.5–5.5)
SODIUM BLD-SCNC: 139 MMOL/L (ref 132–146)
TOTAL PROTEIN: 6.3 G/DL (ref 6.4–8.3)
WBC # BLD: 17.9 E9/L (ref 4.5–11.5)

## 2020-08-29 PROCEDURE — 97161 PT EVAL LOW COMPLEX 20 MIN: CPT

## 2020-08-29 PROCEDURE — 97165 OT EVAL LOW COMPLEX 30 MIN: CPT

## 2020-08-29 PROCEDURE — 85025 COMPLETE CBC W/AUTO DIFF WBC: CPT

## 2020-08-29 PROCEDURE — 80053 COMPREHEN METABOLIC PANEL: CPT

## 2020-08-29 PROCEDURE — 2580000003 HC RX 258: Performed by: PHYSICIAN ASSISTANT

## 2020-08-29 PROCEDURE — 82962 GLUCOSE BLOOD TEST: CPT

## 2020-08-29 PROCEDURE — 85610 PROTHROMBIN TIME: CPT

## 2020-08-29 PROCEDURE — 6370000000 HC RX 637 (ALT 250 FOR IP): Performed by: PHYSICIAN ASSISTANT

## 2020-08-29 PROCEDURE — 36415 COLL VENOUS BLD VENIPUNCTURE: CPT

## 2020-08-29 PROCEDURE — 97530 THERAPEUTIC ACTIVITIES: CPT

## 2020-08-29 PROCEDURE — 6360000002 HC RX W HCPCS: Performed by: PHYSICIAN ASSISTANT

## 2020-08-29 PROCEDURE — 97535 SELF CARE MNGMENT TRAINING: CPT

## 2020-08-29 PROCEDURE — 1280000000 HC REHAB R&B

## 2020-08-29 RX ADMIN — DEXAMETHASONE SODIUM PHOSPHATE 4 MG: 4 INJECTION, SOLUTION INTRAMUSCULAR; INTRAVENOUS at 11:30

## 2020-08-29 RX ADMIN — MESALAMINE 800 MG: 400 CAPSULE, DELAYED RELEASE ORAL at 14:18

## 2020-08-29 RX ADMIN — INSULIN LISPRO 4 UNITS: 100 INJECTION, SOLUTION INTRAVENOUS; SUBCUTANEOUS at 11:30

## 2020-08-29 RX ADMIN — ATORVASTATIN CALCIUM 40 MG: 40 TABLET, FILM COATED ORAL at 20:53

## 2020-08-29 RX ADMIN — Medication 10 ML: at 22:20

## 2020-08-29 RX ADMIN — GABAPENTIN 400 MG: 400 CAPSULE ORAL at 20:54

## 2020-08-29 RX ADMIN — MESALAMINE 800 MG: 400 CAPSULE, DELAYED RELEASE ORAL at 20:53

## 2020-08-29 RX ADMIN — SENNOSIDES AND DOCUSATE SODIUM 1 TABLET: 8.6; 5 TABLET ORAL at 20:53

## 2020-08-29 RX ADMIN — INSULIN LISPRO 8 UNITS: 100 INJECTION, SOLUTION INTRAVENOUS; SUBCUTANEOUS at 06:41

## 2020-08-29 RX ADMIN — BUTALBITAL, ACETAMINOPHEN, AND CAFFEINE 1 TABLET: 50; 325; 40 TABLET ORAL at 09:17

## 2020-08-29 RX ADMIN — GABAPENTIN 400 MG: 400 CAPSULE ORAL at 09:16

## 2020-08-29 RX ADMIN — VANCOMYCIN HYDROCHLORIDE 1.5 G: 10 INJECTION, POWDER, LYOPHILIZED, FOR SOLUTION INTRAVENOUS at 05:15

## 2020-08-29 RX ADMIN — INSULIN LISPRO 5 UNITS: 100 INJECTION, SOLUTION INTRAVENOUS; SUBCUTANEOUS at 20:54

## 2020-08-29 RX ADMIN — POTASSIUM CHLORIDE 20 MEQ: 1500 TABLET, EXTENDED RELEASE ORAL at 09:15

## 2020-08-29 RX ADMIN — BUMETANIDE 2 MG: 1 TABLET ORAL at 09:15

## 2020-08-29 RX ADMIN — GABAPENTIN 400 MG: 400 CAPSULE ORAL at 14:18

## 2020-08-29 RX ADMIN — DEXAMETHASONE SODIUM PHOSPHATE 4 MG: 4 INJECTION, SOLUTION INTRAMUSCULAR; INTRAVENOUS at 06:48

## 2020-08-29 RX ADMIN — VANCOMYCIN HYDROCHLORIDE 1.5 G: 10 INJECTION, POWDER, LYOPHILIZED, FOR SOLUTION INTRAVENOUS at 17:30

## 2020-08-29 RX ADMIN — OXYCODONE HYDROCHLORIDE 10 MG: 10 TABLET ORAL at 18:03

## 2020-08-29 RX ADMIN — MESALAMINE 800 MG: 400 CAPSULE, DELAYED RELEASE ORAL at 09:17

## 2020-08-29 RX ADMIN — BISACODYL 5 MG: 5 TABLET, COATED ORAL at 09:15

## 2020-08-29 RX ADMIN — INSULIN LISPRO 6 UNITS: 100 INJECTION, SOLUTION INTRAVENOUS; SUBCUTANEOUS at 17:30

## 2020-08-29 RX ADMIN — DILTIAZEM HYDROCHLORIDE 120 MG: 120 CAPSULE, COATED, EXTENDED RELEASE ORAL at 09:16

## 2020-08-29 RX ADMIN — BUMETANIDE 2 MG: 1 TABLET ORAL at 20:54

## 2020-08-29 RX ADMIN — Medication 10 ML: at 09:18

## 2020-08-29 RX ADMIN — LOSARTAN POTASSIUM 50 MG: 50 TABLET, FILM COATED ORAL at 09:17

## 2020-08-29 RX ADMIN — ESCITALOPRAM 20 MG: 10 TABLET, FILM COATED ORAL at 07:13

## 2020-08-29 ASSESSMENT — PAIN - FUNCTIONAL ASSESSMENT
PAIN_FUNCTIONAL_ASSESSMENT: PREVENTS OR INTERFERES SOME ACTIVE ACTIVITIES AND ADLS

## 2020-08-29 ASSESSMENT — PAIN DESCRIPTION - LOCATION
LOCATION: NECK;BACK

## 2020-08-29 ASSESSMENT — PAIN DESCRIPTION - ONSET
ONSET: ON-GOING

## 2020-08-29 ASSESSMENT — PAIN DESCRIPTION - FREQUENCY
FREQUENCY: INTERMITTENT

## 2020-08-29 ASSESSMENT — PAIN SCALES - GENERAL
PAINLEVEL_OUTOF10: 0
PAINLEVEL_OUTOF10: 4
PAINLEVEL_OUTOF10: 0
PAINLEVEL_OUTOF10: 0
PAINLEVEL_OUTOF10: 7
PAINLEVEL_OUTOF10: 5
PAINLEVEL_OUTOF10: 0
PAINLEVEL_OUTOF10: 7
PAINLEVEL_OUTOF10: 7

## 2020-08-29 ASSESSMENT — PAIN DESCRIPTION - PROGRESSION
CLINICAL_PROGRESSION: NOT CHANGED

## 2020-08-29 ASSESSMENT — PAIN DESCRIPTION - DESCRIPTORS
DESCRIPTORS: ACHING;CONSTANT;DISCOMFORT
DESCRIPTORS: ACHING;CONSTANT;DISCOMFORT
DESCRIPTORS: ACHING;CONSTANT;DISCOMFORT;HEADACHE

## 2020-08-29 ASSESSMENT — PAIN DESCRIPTION - PAIN TYPE
TYPE: SURGICAL PAIN

## 2020-08-29 ASSESSMENT — PAIN DESCRIPTION - ORIENTATION: ORIENTATION: ANTERIOR

## 2020-08-29 NOTE — PROGRESS NOTES
Physical Therapy    [address]  Initial Assessment    NAME: Shala Moore  : 1965  MRN: [id number]    Date of Service: 2020  Evaluating Therapist: Hi Salazar DPT    ROOM: [facility]  DIAGNOSIS: Cervical Myelopathy   PMH: s/p C4-C7 ACDF on 20. PMH include anemia, anxiety, hx CVA, CKD, COPD, chron's disease, DM, HTN, HLD, obesity, raynaud's disease, LEONOR, tuberculosis. PRECAUTIONS: cervical precautions, C-collar    Patient lives alone alan mutli-level house with 3 steps to enter without HR. Pt reporting friends/neighbors will assist upon D/C. 7-8 steps with bilateral HR to second floor bed/bathroom. Patient ambulated with cane PTA. States she recently has had multiple falls at home.        Initial Evaluation  20   Short Term Goals Long Term Goals    Was pt agreeable to Eval/treatment? yes       Does pt have pain?  7/10 shoulders/neck        Bed Mobility  Rolling: mod I   Supine to sit: mod I   Sit to supine: mod I   Scooting: mod I        Transfers Sit to stand: sup  Stand to sit: sup  Stand pivot: sup with spc   Sup with AAD Mod I with AAD   Ambulation    250 feet with spc with sup   >500 feet with AAD with sup >1000 feet with AAD with mod I   Walking 10 feet on uneven surface 10 feet with spc with sup   >500 feet with AAD with sup >1000 feet with AAD with mod I   Wheel Chair Mobility n/a       Car Transfers sup    Mod I   Stair negotiation: ascended and descended  12 steps with one rail and spc with sba/sup (recip patterning)    12 steps without rail with sup >12 steps without rail with mod I   Curb Step:   ascended and descended 7.5 inch step with AAD and sba/sup    >9 inch step with AAD and sup >9 inch step with AAD and mod I   Picking up object off the floor Picked up 1# with mod I        BLE ROM WFL       BLE Strength 4+/5 bilateral LE       Balance  Sitting: Independent standing: sup with spc  Ambriz 54/56 Low fall risk        Date Family Teach Completed        Is additional Family Teaching Needed? Y or N        Hindering Progress Higher level balance       PT recommended ELOS 2-3 days       Team's Discharge Plan        Therapist at Team Meeting          Pt is alert and Oriented x4  Sensation: intact to LT however reporting history of neuropathy and slightly diminished to LT on R LE  Edema: none noted  Tone/reflexes/spasticity: none noted  Coordination: HTS normal, TTF normal  Endurance: fair   Skin was inspected: intact  Chair alarm:  n/a     ASSESSMENT  Pt displays functional ability as noted in the objective portion of this evaluation. Comments:  Pt sitting in w/c, agreeable to PT evaluation and treatment. Pt demonstrating good awareness of C-spine precautions and insight into deficits. Pt reporting a history of multiple falls in the recent months, however demonstrating low fall risk according to CAMPO balance test. Pt ambulating with steady, equal step length and demonstrating no strength deficits or safety deficits with mobility. Pt endorsing a history of neuropathy with slight diminished sensation to R LE. Pt completing stairs with reciprocal patterning with no LOB or buckling noted. Pt will benefit from intensive skilled PT for higher level balance deficits, as well as progression to LRAD to return home Independently at highest level of functional mobility. Patient education  Pt educated on spinal/cervical collar and precautions, safety with mobility, compensatory balance strategies, role of PT, PT POC, call bell use    Patient response to education:   Pt verbalized understanding Pt demonstrated skill Pt requires further education in this area   yes yes reinforcement     Rehab potential is Good for reaching above PT goals. Pts/ family goals   1. To get home     Patient and or family understand(s) diagnosis, prognosis, and plan of care. PLAN  PT care will be provided in accordance with the objectives noted above.   Whenever appropriate, clear delegation orders will be provided for nursing staff. Exercises and functional mobility practice will be used as well as appropriate assistive devices or modalities to obtain goals. Patient and family education will also be administered as needed. Frequency of treatments will be 2x/day M-F and 1x/day Sat or Sun x  2-3 days.     Time in: 0945  Time out: [address] [place], [place]   [id number]

## 2020-08-29 NOTE — PROGRESS NOTES
Therapeutic Recreation Assessment     LEISURE INTEREST SURVEY               Key: P = Past Interest C = Current Interest F = Future Interest     Arts/Crafts:  ___Mechanical  ___Woodworking    ___ Painting   ___Floral arranging ___ Ceramics         _ __ Knitting                                                     _P__ Crocheting        ___Other: ___________      Cooking:  C_ _Baking C___Cooking    ___   Other: _______   Comments:       Games:  ___Cards  ___Darts  ___Board games    ___Word games  ___Crossword puzzles    _C__Seek-n-find/jumble                   C___Other: __Jigsaw Puzzles______      Horticulture:  ___Vegetables  ___Flowers  ___House plants                                                     ___Other: ___________      House/Yard Care:  ___Ironing  ___Laundry  ___Cleaning                                                      ___Repairs              ___Lawn care                                                     ___Other: ___________      Music Listening/Playing:  ___Classical       ___Big Band       ___Rock-n-Roll                                                     ___Country          ___R&B             ___Gospel/Hymns                                                     _C__Other: _A variety__________      Outdoor Activities:  ___Hunting          _P__Fishing          ___Camping                                                     ___Other: ___________      Pets/Animals:  X 2___Dogs             _C__Cats                ___Fish                                                     ___Birds             ___Livestock         ___Other: _________    Reading:   ___Newspapers  ___Magazines ___Other:stories                                                     _C__Other: _Murder Mysteries__________      Yarsani Activities:  WMakayla Navas: _ Josephs__________   Moravian Activities: ___________      Shopping:   ___Grocery  ___Clothing  ___Flea market     ___Other: ___________      Socialization: C___Family  _C__Friends _C__Neighbors       _C__Church  ___Volunteer ___Club/Organization                                                     __C_Other: __CCD_________    Sports/Exercises:  _C__Walking  ___Football  ___Basketball     ___Golf  ___Baseball  ___Tennis       ___Bowling  _C__Other: __Swimming_________      Traveling:   ___Global  ___Stateside  ___Local       ___Other: ___________      TV/Radio:   ___Mysteries  ___Comedies ___Romance                                                     ___Game show       ___Sports       ___News                                                      ___Talk show          _C__Other: _Hallmark__________      Other: [facility] identified feelings of being discouraged. Personal Leisure Profile:   Question Response   Attributes Identify a positive quality: []Ambitious  []Appreciative  [x]Caring  []Courteous  []Considerate  []Compassionate  []Creative  []Dependable   []Generous  []Honest  []Hard working  []Helpful  []Kind  []Beverly   [][place]  []Mannerly  []Patient  []Polite  []Respectful  []Sociable  []Sense of humor  []Thoughtful  []Other: ___________    Sheltonangelo Sutton are very good at Teaching / Singing   Awareness Why do you participate in your leisure interest: []Competition  []Creativity  []Concentration  []Exercise  []Enjoyment  []Fun  []Hand/eye Coordination  []Increase confidence  []Learning a new skill  []Relaxation  [x]Social Interaction  []Supporting one another  []Thinking  []Other: ___________    Are your leisure activities limited at this time? [x]Yes  []No  Comments: _________    How often do you participate in your leisure interest? Everyday   Resources Name a restaurant, store or business you frequent? The dog park   Personal When are you most happy?  With family     Barriers to Leisure Participation:  []Visual   [][place]  []Cognition/Communication  []Motivation  []Financial  []Transportation  []Time Constraints  [x]Physical Ability  []Leisure Awareness  []Drug/Alcohol  []Other: ___________    Recommendations:  []Group Session  [x]Individual Session  []Client Refused Services  []No Therapy  []Other: ___________    Objectives:  []Establish adaptations for leisure involvement   []Increase Self worth/self esteem  []Community reintegration training   [x]Social interaction  [x]Leisure participation  []Other: ___________    Goals for Therapeutic Recreation Services:   Social Interaction:   Admission Functional [place] Measure: ____6_______  Discharge Functional [place] Measure: ____7_______   Other:________________________________      Leisure Choice/ Increase [place] Quality of Life: To participate in 1 premorbid leisure activities of choice by discharge to increase leisure choice and independence thus increasing the overall quality of his/her life. Socialization/Social Interaction: To increase social interaction skills by introducing self 1 x per week at the beginning of TR session Socialization/Social Interaction: To initiate conversation 1 x per week during the TR session    Leisure Activity Tolerance: To increase leisure tolerance by attending 1 leisure activities per week for 20 minutes with active participation. Self Expression: To participate in 1 leisure activity(s) of choice, identifying 1 benefits to leisure participation. Self esteem/confidence: To complete 1 leisure activities with success 1 X  by discharge. Adelfo Capps

## 2020-08-29 NOTE — PROGRESS NOTES
Dayna Lanes is a 60 y.o. female patient.     Current Facility-Administered Medications   Medication Dose Route Frequency Provider Last Rate Last Dose    dexamethasone (DECADRON) injection 4 mg  4 mg Intravenous Q6H ROBBIE Cormier   4 mg at 08/29/20 2420    bisacodyl (DULCOLAX) EC tablet 5 mg  5 mg Oral Daily Mary Anne Cormier        fleet rectal enema 1 enema  1 enema Rectal Daily PRN ROBBIE Cormier        magnesium hydroxide (MILK OF MAGNESIA) 400 MG/5ML suspension 30 mL  30 mL Oral Daily PRN ROBBIE Cormier        oxyCODONE (ROXICODONE) immediate release tablet 5 mg  5 mg Oral Q6H PRN ROBBIE Cormier        Or    oxyCODONE HCl (OXY-IR) immediate release tablet 10 mg  10 mg Oral Q6H PRN ROBBIE Cormier   10 mg at 08/28/20 1710    polyethylene glycol (GLYCOLAX) packet 17 g  17 g Oral Daily ROBBIE Cormier        promethazine (PHENERGAN) tablet 12.5 mg  12.5 mg Oral Q6H PRN ROBBIE Cormier        sennosides-docusate sodium (SENOKOT-S) 8.6-50 MG tablet 1 tablet  1 tablet Oral BID ROBBIE Cormier        sodium chloride flush 0.9 % injection 10 mL  10 mL Intravenous 2 times per day ROBBIE Cormier        sodium chloride flush 0.9 % injection 10 mL  10 mL Intravenous PRN ROBBIE Cormier        vancomycin 1.5 g in dextrose 5% 300 mL IVPB  1,500 mg Intravenous Q12H ROBBIE Cormier 150 mL/hr at 08/29/20 0515 1.5 g at 08/29/20 0515    dextrose 5 % solution  100 mL/hr Intravenous PRN ROBBIE Cormier        dextrose 50 % IV solution  12.5 g Intravenous PRN ROBBIE Cormier        glucagon (rDNA) injection 1 mg  1 mg Intramuscular PRN ROBBIE Cormier        glucose (GLUTOSE) 40 % oral gel 15 g  15 g Oral PRN ROBBIE Cormier        atorvastatin (LIPITOR) tablet 40 mg  40 mg Oral Daily ROBBIE Cormier        benzocaine-menthol (CEPACOL SORE THROAT) lozenge 1 lozenge  1 lozenge Oral Q2H PRN ROBBIE Cormier        bumetanide breastfeeding. Subjective:  Symptoms:  Stable. She reports weakness. Diet:  Adequate intake. Activity level: Impaired due to weakness. Pain:  She reports no pain. Objective:  General Appearance: In no acute distress. Vital signs: (most recent): Blood pressure 127/61, pulse 72, temperature 98.5 °F (36.9 °C), temperature source Temporal, resp. rate 16, height 5' 3\" (1.6 m), weight 189 lb (85.7 kg), not currently breastfeeding. Vital signs are normal.    Output: Producing urine and producing stool. Lungs:  Normal effort and normal respiratory rate. Breath sounds clear to auscultation. Heart: Normal rate. Regular rhythm. S1 normal and S2 normal.    Abdomen: Abdomen is soft. Bowel sounds are normal.   There is no abdominal tenderness. Extremities: Normal range of motion. Neurological: Patient is alert. (4/5 str). Assessment:    Condition: In stable condition. Improving. (Cervical myelopathy). Plan:   Encourage ambulation. (Migrtaines  kaye therapy  On steroids  Sl scale insulin).        Yg Friedman MD  8/29/2020

## 2020-08-29 NOTE — H&P
Past Medical History:   Diagnosis Date    Allergic 1980    PCN, Most Pain medications,    Anemia 1997    Anxiety 2007    Arm fracture, left     Arthritis     Asthma 1968    Cerebral artery occlusion with cerebral infarction (HCC)     Chronic kidney disease 1989    insufficincy    [facility]     Crohn disease ([facility] 2006    Crohn's disease ([facility]     Diabetes mellitus ([facility]     Essential hypertension 9/21/2017    Hyperlipidemia     L-S radiculopathy     Obesity 2000    Raynaud disease     Sleep apnea     wears CPAP    Tuberculosis 1980      Past Surgical History:   Procedure Laterality Date    CARDIAC CATHETERIZATION  2-    Dr. Syeda Barreto EF 65%    CARDIAC CATHETERIZATION  2007    [address] N/A 8/24/2020    C4-C5, C5-C6, C6-C7 ANTERIOR CERVICAL DISCECTOMY FUSION --ARM, REG BED  HORSE SHOE, PLATES, SCREWS, GLOBUS performed by Maximino Lofton MD at [address] [place] [address]  [address]    COLONOSCOPY  2005    yearly    FRACTURE SURGERY  2013    ankle right    HYSTERECTOMY  1997       Allergies: Cephalosporins; Codeine; and Penicillins    Medications:   Prior to Admission medications    Medication Sig Start Date End Date Taking? Authorizing Provider   oxyCODONE (ROXICODONE) 5 MG immediate release tablet Take 1 tablet by mouth every 6 hours as needed for Pain for up to 7 days.  8/27/20 9/3/20 Yes ROBBIE Rubio   cyclobenzaprine (FLEXERIL) 10 MG tablet Take 1 tablet by mouth 3 times daily as needed for Muscle spasms (For Crohn's flare up) 8/27/20 9/6/20 Yes ROBBIE Rubio   rizatriptan (MAXALT) 10 MG tablet Take 1 tablet by mouth daily as needed for Migraine May repeat dose in 2 hours if first dose ineffective; no more than 2 doses in 24 hours 7/21/20  Yes RASYA Mack - CNP   Erenumab-aooe (AIMOVIG) 140 MG/ML SOAJ Inject 140 mg into the skin every 30 days I box  Lot: [id number] exp 5/2021 7/21/20  Yes RAYSA Mack - CNP   Erenumab-aooe (AIMOVIG) 140 MG/ML SOAJ Inject 140 mg into the skin every 30 days 7/21/20  Yes RAYSA Rivas CNP   gabapentin (NEURONTIN) 400 MG capsule Take 1 capsule by mouth 3 times daily for 90 days. 6/18/20 9/16/20 Yes RAYSA Florian CNP   escitalopram (LEXAPRO) 20 MG tablet Take 1 tablet by mouth Daily 10/18/19  Yes Historical Provider, MD   butalbital-acetaminophen-caffeine (FIORICET, ESGIC) -40 MG per tablet Take 1 tablet by mouth every 4 hours as needed for Headaches 11/6/19  Yes RAYSA Rivas CNP   losartan (COZAAR) 50 MG tablet Take 1 tablet by mouth daily 10/26/18  Yes Historical Provider, MD   allopurinol (ZYLOPRIM) 100 MG tablet Take 100 mg by mouth daily  5/17/16  Yes Historical Provider, MD   Multiple Vitamins-Minerals (WOMENS ONE DAILY PO) Take 1 tablet by mouth daily    Yes Historical Provider, MD   diltiazem (CARDIZEM CD) 120 MG ER capsule TAKE 1 CAPSULE DAILY 1/27/16  Yes Davis Dakins, MD   ASACOL  MG TBEC TBEC tablet   800 mg 3 times daily  5/28/15  Yes Historical Provider, MD   bumetanide (BUMEX) 1 MG tablet TAKE 2 TABLETS TWICE A DAY 4/13/15  Yes Davis Dakins, MD   Cholecalciferol (VITAMIN D PO) Take 1,000 Units by mouth daily    Yes Historical Provider, MD   B Complex Vitamins (VITAMIN B COMPLEX) TABS Take 1 tablet by mouth daily    Yes Historical Provider, MD   potassium chloride SA (K-DUR;KLOR-CON M) 20 MEQ tablet Take 1 tablet by mouth daily. 3/3/14  Yes Tisha Crawley MD   atorvastatin (LIPITOR) 40 MG tablet Take 40 mg by mouth daily.    Yes Historical Provider, MD   PROVENTIL  (90 BASE) MCG/ACT inhaler Inhale 1 puff into the lungs every 4 hours as needed  9/25/15   Historical Provider, MD      Current Facility-Administered Medications:     dexamethasone (DECADRON) injection 4 mg, 4 mg, Intravenous, Q6H, ROBBIE Bernal, 4 mg at 08/28/20 1659    [START ON 8/29/2020] bisacodyl (DULCOLAX) EC tablet 5 mg, 5 mg, Oral, Daily, [facility]essi Coral [facility] 1 enema, 1 enema, Rectal, Daily PRN, Larry Early, PA    magnesium hydroxide (MILK OF MAGNESIA) 400 MG/5ML suspension 30 mL, 30 mL, Oral, Daily PRN, Acey Early, PA    oxyCODONE (ROXICODONE) immediate release tablet 5 mg, 5 mg, Oral, Q6H PRN **OR** oxyCODONE HCl (OXY-IR) immediate release tablet 10 mg, 10 mg, Oral, Q6H PRN, Acey Early, PA, 10 mg at 08/28/20 1710    [START ON 8/29/2020] polyethylene glycol (GLYCOLAX) packet 17 g, 17 g, Oral, Daily, Acey Early, PA    promethazine (PHENERGAN) tablet 12.5 mg, 12.5 mg, Oral, Q6H PRN **OR** [DISCONTINUED] ondansetron (ZOFRAN) injection 4 mg, 4 mg, Intravenous, Q6H PRN, Acesandra Early, PA    sennosides-docusate sodium (SENOKOT-S) 8.6-50 MG tablet 1 tablet, 1 tablet, Oral, BID, Acesandra Early, PA    sodium chloride flush 0.9 % injection 10 mL, 10 mL, Intravenous, 2 times per day, Acey Early, PA    sodium chloride flush 0.9 % injection 10 mL, 10 mL, Intravenous, PRN, Acey Early, PA    vancomycin 1.5 g in dextrose 5% 300 mL IVPB, 1,500 mg, Intravenous, Q12H, Acey Early, PA, Last Rate: 150 mL/hr at 08/28/20 1831, 1.5 g at 08/28/20 1831    dextrose 5 % solution, 100 mL/hr, Intravenous, PRN, Acey Early, PA    dextrose 50 % IV solution, 12.5 g, Intravenous, PRN, Acey Early, PA    glucagon (rDNA) injection 1 mg, 1 mg, Intramuscular, PRN, Acey Early, PA    glucose (GLUTOSE) 40 % oral gel 15 g, 15 g, Oral, PRN, Acey Early, PA    atorvastatin (LIPITOR) tablet 40 mg, 40 mg, Oral, Daily, Acey Early, PA    benzocaine-menthol (CEPACOL SORE THROAT) lozenge 1 lozenge, 1 lozenge, Oral, Q2H PRN, Acey Early, PA    bumetanide (BUMEX) tablet 2 mg, 2 mg, Oral, BID, Acey Early, PA    butalbital-acetaminophen-caffeine (FIORICET, ESGIC) per tablet 1 tablet, 1 tablet, Oral, Q4H PRN, Acey Early, PA    cyclobenzaprine (FLEXERIL) tablet 10 mg, 10 mg, Oral, TID PRN, Jimy Bullion, PA  [email or web address].Kelso  [START ON 8/29/2020] dilTIAZem (CARDIZEM CD) extended release capsule 120 mg, 120 mg, Oral, Daily, Jimy Bullion, PA    [START ON 8/29/2020] escitalopram (LEXAPRO) tablet 20 mg, 20 mg, Oral, Daily, Jimy Bullion, PA    gabapentin (NEURONTIN) capsule 400 mg, 400 mg, Oral, TID, Jimy Bullion, PA    insulin lispro (HUMALOG) injection vial 0-12 Units, 0-12 Units, Subcutaneous, TID WC, Jimy Bullion, PA, 6 Units at 08/28/20 1700    insulin lispro (HUMALOG) injection vial 0-6 Units, 0-6 Units, Subcutaneous, Nightly, Jimy Bullion, PA    [START ON 8/29/2020] losartan (COZAAR) tablet 50 mg, 50 mg, Oral, Daily, Jimy Bullion, PA    mesalamine (DELZICOL) delayed release capsule 800 mg, 800 mg, Oral, TID, Jimy Bullion, PA    [START ON 8/29/2020] potassium chloride (KLOR-CON M) extended release tablet 20 mEq, 20 mEq, Oral, Daily, Jimy Bullion, PA    SUMAtriptan (IMITREX) tablet 100 mg, 100 mg, Oral, Daily PRN, Jimy Bullion, PA    senna (SENOKOT) tablet 8.6 mg, 1 tablet, Oral, Daily PRN, Himanshu Green MD    Family History:   Family History   Problem Relation Age of Onset    Cancer Father         Bladder Cancer    Early Death Sister     Heart Disease Brother     Diabetes Brother     Heart Attack Brother     Diabetes Brother     Heart Disease Brother         MI / CABG x4    Early Death Sister        Social/Functional History:  reports that she has never smoked. She has never used smokeless tobacco. She reports current alcohol use of about 1.0 standard drinks of alcohol per week. She reports that she does not use drugs. Premorbid Functional Status: Patient was independent with mobility/ambulation, transfers, ADL's, IADL's.   Support System and Family Circumstances (i.e., potential caregivers): lives alone, children / family / friends to help, neighbors and level of assistance caregiver capable of intermittent supervision and shopping and occ assist strength 5/5   XI: neck flexion strength  5/5   XII: tongue strength  Normal           Motor:  Right   4/5              Left   4/5               Right Bicep  4/5           Left Bicep  4/5              Right Triceps   4/5       Left Trceps  4/5          Right Deltoid  4/5     Left Deltoid  4/5         Right IPS  4/5            Left IPS  4/5               Right Quadriceps  4+/5          Left Quadriceps    4+/5           Right Gastrocnemius    2/5    Left Gastrocnemius   2/5  Right Ant Tibialis   4+/5  Left Ant Tibialis   4+/5        Sensory:  normal to light touch and a pin prick (B) UE and :LE        Gait: not tested due to safety concerns      Coordination:   test for rapid alternating movements reduced      DTR:   Right Brachioradialis reflex 0  Left Brachioradialis reflex 0  Right Biceps reflex 0  Left Biceps reflex 0  Right Triceps reflex 0  Left Triceps reflex 0  Right Quadriceps reflex 1+  Left Quadriceps reflex 1+  Right Achilles reflex 0  Left Achilles reflex 0      ASSESSMENT AND PLAN      Patient Active Problem List   Diagnosis    L-S radiculopathy    Migraine without aura and without status migrainosus, not intractable    Essential hypertension    Type 2 diabetes mellitus with renal complication (HCC)    Stage 3 chronic kidney disease (HCC)    HLD (hyperlipidemia)    Closed displaced fracture of head of left radius    Hx-TIA (transient ischemic attack)    Cervical spondylosis    Frequent falls    Cervical herniated disc    Cervical myelopathy (HCC)       1:  Primary indication for inpatient rehabilitation admission over other settings:  Spinal Cord Dysfunction:  Traumatic:  04.230  Other Traumatic cervical myelopathy  2:  Comorbidities:  Comorbid Conditions in addition to those listed above none  3. Estimated length of stay:  10-14 days  4. Anticipated disposition:  Home. The potential to achieve that is excellent.   5:  Precautions:  falls, infections and skin      Patient Active Problem List   Diagnosis    L-S radiculopathy    Migraine without aura and without status migrainosus, not intractable    Essential hypertension    Type 2 diabetes mellitus with renal complication (HCC)    Stage 3 chronic kidney disease ([facility] [address]    HLD (hyperlipidemia)    Closed displaced fracture of head of left radius    Hx-TIA (transient ischemic attack)    Cervical spondylosis    Frequent falls    Cervical herniated disc    Cervical myelopathy (HCC)         Admitted for comprehensive inpatient rehabilitation including PT, OT, RT, SLP, and supportive services to improve functional outcome of impaired mobility, ADL's, transfers, and self-care. Rehabilitation Potential: excellent      IOPOC: I concur with the preadmission screen except as documented within this note. Care at a lower level would risk decline in function and increase risk of complications.  Begin PT 90 minutes per day 5-7 days per week with focus on strengthening, balance, progressive ambulation, elevations and positioning; OT 90 minutes per day 5-7 days per week with focus on fine motor skills, ADLs, IADLs, energy conservation and equipment needs; rehab nursing 24/7 for skin care, bowel and bladder management, medication management, education, pain control and carryover and  for discharge planning.     Current Functional Status:   FIM / EVAL Discipline Initial: 8/25/20 Follow Up: 8/26/20 Current:    Eating OT Stand by Assist    Stand by Assist    Stand by Assist      Grooming OT Moderate Assist    Moderate Assist    Stand by Assist      Bathing OT Moderate Assist    Moderate Assist    Stand by Assist      Dressing Upper Extremity OT Minimum assistance    Minimum assistance    Stand by Assist      Dressing Lower Extremity OT Max Assist    Minimum assistance    Stand by Assist      Toileting OT nt Minimum assistance    DNT   Toilet Transfers OT nt Minimum assistance    Stand by Assist      Tub/Shower Transfers OT nt nt nt   Homemaking OT nt nt nt   Bed Mobility PT nt Stand by Assist    Stand by Assist      Bed/Wheelchair Transfers PT Minimum assistance    Minimum assistance    Stand by Assist      Locomotion Walk / Wheelchair  Device:  Distance: PT 25 ft with FWW Minimum assistance    25 ft x 2 with fww Minimum assistance    150 ft with FWW. 150 ft with CGA         Additional comments:I reviewed the patient's new clinical lab test results. Results for Paulo Toure (MRN [id number])   Ref. Range 8/24/2020 13:42 8/24/2020 18:50 8/25/2020 08:32 8/25/2020 11:56 8/25/2020 17:31 8/25/2020 21:57 8/26/2020 08:14 8/26/2020 12:41 8/26/2020 16:50 8/26/2020 20:56 8/27/2020 08:08 8/27/2020 11:58 8/27/2020 17:16 8/27/2020 21:24 8/28/2020 06:56 8/28/2020 11:35 8/28/2020 16:51 8/28/2020 22:35   Meter Glucose Latest Ref Range: 74 - 99 mg/dL 140 (H)  295 (H) 290 (H) 201 (H) 209 (H) 206 (H) 191 (H) 139 (H) 190 (H) 171 (H) 193 (H) 134 (H) 198 (H) 288 (H) 314 (H) 275 (H) 350 (H)       Check baseline labs. Control blood sugars.

## 2020-08-29 NOTE — PROGRESS NOTES
Occupational Therapy   Occupational Therapy Initial Assessment  Date: 2020   Patient Name: Trinity Dodson  MRN: [id number]     : 1965  Room: 5515B    Date of Service: 2020    Discharge Recommendations:  Home with assist PRN     Restrictions: Fall Risk, cervical precautions, cervical collar, dental soft & carb controlled diet    Subjective   Chart Reviewed:  Yes  Additional Pertinent Hx: HLD, DM, COPD, hx CVA- 2016, LEONOR, HTN, CKD, Crohn's disease, Raynaud's disease & hx falls  Family / Caregiver Present: No  Referring Practitioner: Ehsan Antonio MD  Diagnosis: ACDF C4-5, 5-6 & 6-7  Subjective: Pt presents seated EOB & was agreeable to OT intervention  Pain Comments: Pt did c/o headache pain this am in OT    Social/Functional History  Lives With: Alone-  lives in [place]  Type of Home: [facility] [facility]: Multi-level- 7-8 steps BHR to bed & bathroom  Home Access: Stairs to enter without rails  Entrance Stairs - Number of Steps: 2 steps +1 (9inch step) into the home  Bathroom Shower/Tub: Tub/Shower unit, Walk-in shower(tub on 1st floor & walk in shower on the 2nd floor)  Bathroom Toilet: Standard  Home Equipment: Quad cane, Standard walker  ADL Assistance: Independent  Homemaking Assistance: Independent  Homemaking Responsibilities: Yes  Ambulation Assistance: Independent  Transfer Assistance: Independent  Occupation: On disability  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  IADL Comments: PLOF pt independent in all areas ADLs, transfers, mobility, home making & caring for her pets  Additional Comments: 2 dogs- 1 husky & 1 lab     Objective   Vision: Impaired  Vision Exceptions: Wears glasses for reading  Hearing: Within functional limits      Orientation  Overall Orientation Status: Within Functional Limits     Observation/Palpation  Posture: Good     Balance  Sitting Balance: Independent  Standing Balance: Stand by assistance  Functional Mobility  Functional - Mobility Device: sc  Activity: To/From therapy gym  Assist Level: Stand by assistance  Functional Mobility Comments: min vc;s for safety  Toilet Transfers  Toilet - Technique: Ambulating  Equipment Used: Standard toilet  Toilet Transfer: Supervision     ADL  Feeding: Setup  Grooming: Supervision- standing @ sink level  UE Bathing: Stand by assistance;Verbal cueing;Setup  LE Bathing: Contact guard assistance;Verbal cueing;Setup  UE Dressing: Stand by assistance;Setup  LE Dressing: Setup;Verbal cueing; Increased time to complete  Toileting: Supervision     Tone RUE  RUE Tone: Normotonic  Tone LUE  LUE Tone: Normotonic  Coordination  Movements Are Fluid And Coordinated: Yes        Transfers  Sit to stand: Supervision  Stand to sit: Supervision     Vision - Basic Assessment  Prior Vision: Wears glasses only for reading  Visual History: No significant visual history  Patient Visual Report: No visual complaint reported. Cognition  Overall Cognitive Status: WFL     Sensation  Overall Sensation Status: WFL      BUE AROM: BUE AROM in all planes WFL  Right & Left Hand AROM: B hand grasp & relelase WFL.  Pt able to oppose her thumb to each digit  BUE Strength  B Hand General: 4+/5  BUE Strength Comment: BUE strength WFL in all planes     Hand Dominance: Right    Left Hand Strength -   Handle Setting 2: 40# & norm for pt age & gender is 47#  Right Hand Strength -   Handle Setting 2: 50# & norm for pt age & gender is 57#    Fine Motor Skills  Left 9 Hole Peg Test Time: 27.0 seconds & norm for pt age & gender is 19.5 seconds  Right 9 Hole Peg Test Time: 27.2 seconds & norm for pt age & gender is 17.9 seconds     Plan   Plan  Times per week: OT twice a day for 5 days to address above problem areas  Times per day: Twice a day  Current Treatment Recommendations: Gait Training, Patient/Caregiver Education & Training, Home Management Training, Balance Training, Equipment Evaluation, Education, & procurement, Endurance Training, Functional Mobility Training, Safety Education & Training, Self-Care / ADL    Assessment   Performance deficits / Impairments: Decreased functional mobility ; Decreased endurance;Decreased ADL status; Decreased balance;Decreased high-level IADLs  Prognosis: Good  Decision Making: Low Complexity  OT Education: OT Role;Precautions;Plan of Care;ADL Adaptive Strategies;Transfer Training;Energy Conservation;IADL Safety  Patient Education: Pt educated with regards to OT process. Pt participated in OT goal planning. Pt pleasant & cooperative throughout the OT session  REQUIRES OT FOLLOW UP: Yes  Activity Tolerance: Patient Tolerated treatment well  Safety Devices in place: Yes  Type of devices: Call light within reach         Goals  Long term goals  Time Frame for Long term goals : 5 days to address above probem areas  Long term goal 1: Pt demo independent to eat all meals  Long term goal 2: Pt demo Mod I to stand @ sink for grooming & hygiene  Long term goal 3: Pt demo Sup bathing @ sink levle or shower level with AE as needed  Long term goal 4: Pt demo Mod I UE/LE dress  Long term goal 5: Pt demo Mod I commode trf with appropriate DME to esnure pt safety  Long term goals 6: Pt demo SUp walk in shower trf with approrpaite DME to esnure pt safety  Long term goal 7: Pt demo Mod I light homemaking activity  Long term goal 8: Pt demo G endurance for a 30 minute functional activity  Long term goal 9: Pt will state & adhere to precautions during ADL & functioanlly based tasks  Patient Goals   Patient goals : \"To be able to do my stairs, maneuver around the house. \"     Therapy Time   Individual Concurrent Group Co-treatment   Time In 745-OT eval  755-OT rx         Time Out 755-OT eval  845-OT rx         Minutes 60 Min OT total  10 Min OT eval  50 Min OT rx          Tima Ranks, OTR/L [id number]

## 2020-08-30 LAB
METER GLUCOSE: 192 MG/DL (ref 74–99)
METER GLUCOSE: 219 MG/DL (ref 74–99)
METER GLUCOSE: 228 MG/DL (ref 74–99)
METER GLUCOSE: 328 MG/DL (ref 74–99)

## 2020-08-30 PROCEDURE — 97530 THERAPEUTIC ACTIVITIES: CPT

## 2020-08-30 PROCEDURE — 82962 GLUCOSE BLOOD TEST: CPT

## 2020-08-30 PROCEDURE — 6360000002 HC RX W HCPCS: Performed by: PHYSICIAN ASSISTANT

## 2020-08-30 PROCEDURE — 2580000003 HC RX 258: Performed by: PHYSICIAN ASSISTANT

## 2020-08-30 PROCEDURE — 97110 THERAPEUTIC EXERCISES: CPT

## 2020-08-30 PROCEDURE — 1280000000 HC REHAB R&B

## 2020-08-30 PROCEDURE — 6370000000 HC RX 637 (ALT 250 FOR IP): Performed by: PHYSICIAN ASSISTANT

## 2020-08-30 PROCEDURE — 97535 SELF CARE MNGMENT TRAINING: CPT

## 2020-08-30 RX ADMIN — POTASSIUM CHLORIDE 20 MEQ: 1500 TABLET, EXTENDED RELEASE ORAL at 09:09

## 2020-08-30 RX ADMIN — MESALAMINE 800 MG: 400 CAPSULE, DELAYED RELEASE ORAL at 09:09

## 2020-08-30 RX ADMIN — DILTIAZEM HYDROCHLORIDE 120 MG: 120 CAPSULE, COATED, EXTENDED RELEASE ORAL at 09:10

## 2020-08-30 RX ADMIN — BUMETANIDE 2 MG: 1 TABLET ORAL at 21:20

## 2020-08-30 RX ADMIN — INSULIN LISPRO 4 UNITS: 100 INJECTION, SOLUTION INTRAVENOUS; SUBCUTANEOUS at 06:25

## 2020-08-30 RX ADMIN — INSULIN LISPRO 1 UNITS: 100 INJECTION, SOLUTION INTRAVENOUS; SUBCUTANEOUS at 21:21

## 2020-08-30 RX ADMIN — GABAPENTIN 400 MG: 400 CAPSULE ORAL at 09:09

## 2020-08-30 RX ADMIN — GABAPENTIN 400 MG: 400 CAPSULE ORAL at 13:52

## 2020-08-30 RX ADMIN — VANCOMYCIN HYDROCHLORIDE 1.5 G: 10 INJECTION, POWDER, LYOPHILIZED, FOR SOLUTION INTRAVENOUS at 05:35

## 2020-08-30 RX ADMIN — CYCLOBENZAPRINE 10 MG: 10 TABLET, FILM COATED ORAL at 09:09

## 2020-08-30 RX ADMIN — SUMATRIPTAN SUCCINATE 100 MG: 50 TABLET ORAL at 09:10

## 2020-08-30 RX ADMIN — ATORVASTATIN CALCIUM 40 MG: 40 TABLET, FILM COATED ORAL at 21:20

## 2020-08-30 RX ADMIN — Medication 10 ML: at 09:14

## 2020-08-30 RX ADMIN — CYCLOBENZAPRINE 10 MG: 10 TABLET, FILM COATED ORAL at 23:54

## 2020-08-30 RX ADMIN — ESCITALOPRAM 20 MG: 10 TABLET, FILM COATED ORAL at 06:24

## 2020-08-30 RX ADMIN — INSULIN LISPRO 8 UNITS: 100 INJECTION, SOLUTION INTRAVENOUS; SUBCUTANEOUS at 16:50

## 2020-08-30 RX ADMIN — MESALAMINE 800 MG: 400 CAPSULE, DELAYED RELEASE ORAL at 21:20

## 2020-08-30 RX ADMIN — OXYCODONE HYDROCHLORIDE 10 MG: 10 TABLET ORAL at 19:36

## 2020-08-30 RX ADMIN — INSULIN LISPRO 4 UNITS: 100 INJECTION, SOLUTION INTRAVENOUS; SUBCUTANEOUS at 11:16

## 2020-08-30 RX ADMIN — BUMETANIDE 2 MG: 1 TABLET ORAL at 09:09

## 2020-08-30 RX ADMIN — MESALAMINE 800 MG: 400 CAPSULE, DELAYED RELEASE ORAL at 13:52

## 2020-08-30 RX ADMIN — GABAPENTIN 400 MG: 400 CAPSULE ORAL at 21:20

## 2020-08-30 RX ADMIN — LOSARTAN POTASSIUM 50 MG: 50 TABLET, FILM COATED ORAL at 09:10

## 2020-08-30 RX ADMIN — OXYCODONE HYDROCHLORIDE 10 MG: 10 TABLET ORAL at 00:06

## 2020-08-30 ASSESSMENT — PAIN DESCRIPTION - ONSET
ONSET: ON-GOING
ONSET: ON-GOING

## 2020-08-30 ASSESSMENT — PAIN SCALES - GENERAL
PAINLEVEL_OUTOF10: 0
PAINLEVEL_OUTOF10: 0
PAINLEVEL_OUTOF10: 6
PAINLEVEL_OUTOF10: 1
PAINLEVEL_OUTOF10: 3
PAINLEVEL_OUTOF10: 1
PAINLEVEL_OUTOF10: 7
PAINLEVEL_OUTOF10: 6

## 2020-08-30 ASSESSMENT — PAIN DESCRIPTION - PROGRESSION
CLINICAL_PROGRESSION: NOT CHANGED

## 2020-08-30 ASSESSMENT — PAIN DESCRIPTION - ORIENTATION: ORIENTATION: ANTERIOR

## 2020-08-30 ASSESSMENT — PAIN DESCRIPTION - DESCRIPTORS
DESCRIPTORS: ACHING;HEADACHE;SHARP
DESCRIPTORS: ACHING;DISCOMFORT;CONSTANT

## 2020-08-30 ASSESSMENT — PAIN DESCRIPTION - LOCATION
LOCATION: NECK;BACK
LOCATION: HEAD

## 2020-08-30 ASSESSMENT — PAIN - FUNCTIONAL ASSESSMENT: PAIN_FUNCTIONAL_ASSESSMENT: PREVENTS OR INTERFERES SOME ACTIVE ACTIVITIES AND ADLS

## 2020-08-30 ASSESSMENT — PAIN DESCRIPTION - FREQUENCY
FREQUENCY: INTERMITTENT
FREQUENCY: INTERMITTENT

## 2020-08-30 ASSESSMENT — PAIN DESCRIPTION - PAIN TYPE
TYPE: SURGICAL PAIN
TYPE: CHRONIC PAIN

## 2020-08-30 NOTE — PROGRESS NOTES
Occupational Therapy  OCCUPATIONAL THERAPY DAILY NOTE    Date:2020  Patient Name: Dayna Lanes  MRN: [id number]  : 1965  Room: [address] [place] [address]     Patient Active Problem List   Diagnosis    L-S radiculopathy    Migraine without aura and without status migrainosus, not intractable    Essential hypertension    Type 2 diabetes mellitus with renal complication (HCC)    Stage 3 chronic kidney disease ([facility] [address]    HLD (hyperlipidemia)    Closed displaced fracture of head of left radius    Hx-TIA (transient ischemic attack)    Cervical spondylosis    Frequent falls    Cervical herniated disc    Cervical myelopathy (HCC)     Diagnosis: Cervical Myelopathy    Precautions: Fall Risk, cervical precautions, cervical collar, dental soft & carb controlled diet    Functional Assessment:   Date Status AE  Comments   Feeding 20 S/U  Per eval   Grooming 20 S  Per eval- standing at sink   Bathing 20 UE: SBA  LE: CGA  Per eval   UB Dressing 20 SBA/ s/u  Per eval   LB Dressing 20 SUP   Pt manage B shoes using cross over method   Homemaking 20 SBA   SBA while performing baking task with s/u provided     Functional Transfers / Balance:   Date Status DME  Comments   Sit Balance 20 Mod A  Demoed during ADL   Stand Balance 20 SBA  During IADL   ? Tub  ? Shower   Transfer       Commode   Transfer 20  SUP      Functional   Mobility 20 Supervision  Pt performed functional mobility room<> OT gym using cane   Other:    Sit <> stand:      Supine <> sit:     20     S      SBA           Bed rails     From arm chair 3xs    Pt performed sit to supine transfer      Functional Exercises / Activity:   Instructed pt on baking activity with good safety awareness and ability to follow multi-step commands  Pt utilized arm bike x5 mins forward/ backward with minimal resistance provided while standing with S to improve endurance. Pt denied pain just c/o fatigue.   Provided pt with reacher to  objects off floor and to assist with ADLs PRN due to limited cervical ROM per precautions. Sensory / Neuromuscular Re-Education:      Cognitive Skills:   Status Comments   Problem   Solving Good    Memory Fair+    Sequencing Good    Safety Good      Visual Perception:    Education:  Educated pt on activity pacing and energy conservation techniques to prevent falls/ decrease fatigue during ADLs. Pt also educated on use of reacher to decrease difficulty during ADLs/IADLs. ? Family teach completed on:    Pain Level: No c/o pain    Additional Notes:       Patient has made good progress during treatment sessions toward set goals. Therapy emphasis to obtain goals: To increase independence with ADLs/ IADLs, transfers, fucntional mobility and improve activity tolerance. ? Continue with current OT Plan of care. ? Prepare for Discharge    Goals  Long term goals  Time Frame for Long term goals : 5 days to address above probem areas  Long term goal 1: Pt demo independent to eat all meals  Long term goal 2: Pt demo Mod I to stand @ sink for grooming & hygiene  Long term goal 3: Pt demo Sup bathing @ sink levle or shower level with AE as needed  Long term goal 4: Pt demo Mod I UE/LE dress  Long term goal 5: Pt demo Mod I commode trf with appropriate DME to esnure pt safety  Long term goals 6: Pt demo SUp walk in shower trf with approrpaite DME to esnure pt safety  Long term goal 7: Pt demo Mod I light homemaking activity  Long term goal 8: Pt demo G endurance for a 30 minute functional activity  Long term goal 9: Pt will state & adhere to precautions during ADL & functioanlly based tasks  Patient Goals   Patient goals : \"To be able to do my stairs, maneuver around the house. \"       DISCHARGE RECOMMENDATIONS  Recommended DME:    Post Discharge Care:   ? Home Independently  ? Home with 24hr Care / Supervision ? Home with Partial Supervision ? Home with Home Health OT ? Home with Out Pt OT ? Other: ___ Comments:         Time in Time out Tx Time Breakdown  Variance:   First Session  12:05 12:35 x Individual Tx- 30  ? Concurrent Tx -  ? Co-Tx -   ? Group Tx -   ? Time Missed -     Second Session   ? Individual Tx-   ? Concurrent Tx -  ? Co-Tx -   ? Group Tx -   ? Time Missed -     Third Session    ? Individual Tx-   ? Concurrent Tx -  ? Co-Tx -   ? Group Tx -   ? Time Missed -         Total Tx Time: 30 mins        Dorean Spatz CALVIN/L [id number]  I have read the above note and agree with the documentation.   Marta Bernard OTR/L [id number]

## 2020-08-30 NOTE — PROGRESS NOTES
Physical Therapy    Facility/Department: [facility]  Treatment Note     NAME: Trinity Dodson  : 1965  MRN: [id number]    Date of Service: 2020  Evaluating Therapist: Man Pichardo DPT    ROOM: 54/2430-X  DIAGNOSIS: Cervical Myelopathy   PMH: s/p C4-C7 ACDF on 20. PMH include anemia, anxiety, hx CVA, CKD, COPD, chron's disease, DM, HTN, HLD, obesity, raynaud's disease, LEONOR, tuberculosis. PRECAUTIONS: cervical precautions, C-collar    Patient lives alone alan mutli-level house with 3 steps to enter without HR. Pt reporting friends/neighbors will assist upon D/C. 7-8 steps with bilateral HR to second floor bed/bathroom. Patient ambulated with cane PTA. States she recently has had multiple falls at home.        Initial Evaluation  20  PM Short Term Goals Long Term Goals    Was pt agreeable to Eval/treatment? yes  yes     Does pt have pain?  7/10 shoulders/neck   7/10 shoulders/neck      Bed Mobility  Rolling: mod I   Supine to sit: mod I   Sit to supine: mod I   Scooting: mod I   Independent      Transfers Sit to stand: sup  Stand to sit: sup  Stand pivot: sup with spc  Mod I with spc  Sup with AAD Mod I with AAD   Ambulation    250 feet with spc with sup  >500' with spc mod I  >500 feet with AAD with sup >1000 feet with AAD with mod I   Walking 10 feet on uneven surface 10 feet with spc with sup   >500 feet with AAD with sup >1000 feet with AAD with mod I   Wheel Chair Mobility n/a       Car Transfers sup  NT  Mod I   Stair negotiation: ascended and descended  12 steps with one rail and spc with sba/sup (recip patterning)   16 steps with HR and spc mod I (recip patterning) 12 steps without rail with sup >12 steps without rail with mod I   Curb Step:   ascended and descended 7.5 inch step with AAD and sba/sup   7.5 in curb step with ww mod I  >9 inch step with AAD and sup >9 inch step with AAD and mod I   Picking up object off the floor Picked up 1# with mod I        BLE ROM WFL       BLE Strength 4+/5 bilateral LE       Balance  Sitting: Independent standing: sup with spc  Ambriz 54/56 Low fall risk        Date Family Teach Completed        Is additional Family Teaching Needed? Y or N        Hindering Progress Higher level balance       PT recommended ELOS 2-3 days       Team's Discharge Plan        Therapist at Team Meeting          Therapeutic Exercise:   AM: 10x STS transfer at w/c, no cues for hadn placement  Stepping over various height quad canes: NR patterning 10' x2, alternating NR patterning 10' x2, side stepping 10' x2, BW 10' x2, recip patterning 10' x2  Dual task ambulation: holding ball on curb step with lifting/lowering/turning out to the side with visual changes (eyes looking L or R) with occasional sway but no LOB (within spinal/cervical precautions)  Bosu: standing on hard side Eo, EO with perturbations, EC with increased sway/LOB with EC   Green tband walk outs: FW/BW/Sideways x5 reps in all directions     Patient education  Pt educated on safety with mobility, compensatory balance strategies    Patient response to education:   Pt verbalized understanding Pt demonstrated skill Pt requires further education in this area   yes yes reinforcement     Additional Comments: Reviewed all mobility as per above. Continues to challenge with higher level balance tasks to prepare for d/c home. Occasionally turning quickly at times with mobility, however no LOB with unsafe behaviors noted with spc. Will continue to challenge balance as able. AM  Time in: 1005  Time out: 1045      Pt is making good progress toward established Physical Therapy goals. Continue with physical therapy current plan of care.     Shaila Sheldon, RERET  [id number]

## 2020-08-30 NOTE — PROGRESS NOTES
Jonathan Crane is a 60 y.o. female patient.     Current Facility-Administered Medications   Medication Dose Route Frequency Provider Last Rate Last Dose    bisacodyl (DULCOLAX) EC tablet 5 mg  5 mg Oral Daily ROBBIE Haq   5 mg at 08/29/20 0915    fleet rectal enema 1 enema  1 enema Rectal Daily PRN ROBBIE Haq        magnesium hydroxide (MILK OF MAGNESIA) 400 MG/5ML suspension 30 mL  30 mL Oral Daily PRN ROBBIE Haq        oxyCODONE (ROXICODONE) immediate release tablet 5 mg  5 mg Oral Q6H PRN ROBBIE Haq        Or    oxyCODONE HCl (OXY-IR) immediate release tablet 10 mg  10 mg Oral Q6H PRN ROBBIE Haq   10 mg at 08/30/20 0006    polyethylene glycol (GLYCOLAX) packet 17 g  17 g Oral Daily ROBBIE Haq        promethazine (PHENERGAN) tablet 12.5 mg  12.5 mg Oral Q6H PRN ROBBIE Haq        sennosides-docusate sodium (SENOKOT-S) 8.6-50 MG tablet 1 tablet  1 tablet Oral BID ROBBIE Haq   1 tablet at 08/29/20 2053    sodium chloride flush 0.9 % injection 10 mL  10 mL Intravenous 2 times per day ROBBIE Haq   10 mL at 08/29/20 2220    sodium chloride flush 0.9 % injection 10 mL  10 mL Intravenous PRN ROBBIE Haq        dextrose 5 % solution  100 mL/hr Intravenous PRN ROBBIE Haq        dextrose 50 % IV solution  12.5 g Intravenous PRN ROBBIE Haq        glucagon (rDNA) injection 1 mg  1 mg Intramuscular PRN ROBBIE Haq        glucose (GLUTOSE) 40 % oral gel 15 g  15 g Oral PRN ROBBIE Haq        atorvastatin (LIPITOR) tablet 40 mg  40 mg Oral Daily ROBBIE Haq   40 mg at 08/29/20 2053    benzocaine-menthol (CEPACOL SORE THROAT) lozenge 1 lozenge  1 lozenge Oral Q2H PRN ROBBIE Haq        bumetanide (BUMEX) tablet 2 mg  2 mg Oral BID ROBBIE Haq   2 mg at 08/29/20 2054    butalbital-acetaminophen-caffeine (FIORICET, ESGIC) per tablet 1 tablet  1 tablet Oral Q4H PRN Jimy Bullion, PA   1 tablet at 08/29/20 9529    cyclobenzaprine (FLEXERIL) tablet 10 mg  10 mg Oral TID PRN Jimy Bullion, PA        dilTIAZem (CARDIZEM CD) extended release capsule 120 mg  120 mg Oral Daily Jimy Bullion, PA   120 mg at 08/29/20 0916    escitalopram (LEXAPRO) tablet 20 mg  20 mg Oral Daily Jimy Bullion, PA   20 mg at 08/30/20 3334    gabapentin (NEURONTIN) capsule 400 mg  400 mg Oral TID Jimy Bullion, PA   400 mg at 08/29/20 2054    insulin lispro (HUMALOG) injection vial 0-12 Units  0-12 Units Subcutaneous TID WC Jimy Bullion, PA   4 Units at 08/30/20 0517    insulin lispro (HUMALOG) injection vial 0-6 Units  0-6 Units Subcutaneous Nightly Jimy Bullion, PA   5 Units at 08/29/20 2054    losartan (COZAAR) tablet 50 mg  50 mg Oral Daily Jimy Bullion, PA   50 mg at 08/29/20 0682    mesalamine (DELZICOL) delayed release capsule 800 mg  800 mg Oral TID Jimy Bullion, PA   800 mg at 08/29/20 2053    potassium chloride (KLOR-CON M) extended release tablet 20 mEq  20 mEq Oral Daily Jimy Bullion, PA   20 mEq at 08/29/20 0915    SUMAtriptan (IMITREX) tablet 100 mg  100 mg Oral Daily PRN Jimy Bullion, PA        senna (SENOKOT) tablet 8.6 mg  1 tablet Oral Daily PRN Himanshu Green MD         Allergies   Allergen Reactions    Cephalosporins Hives     Plus reaction    Codeine Hives    Penicillins Hives     Active Problems:    Essential hypertension    Type 2 diabetes mellitus with renal complication (HCC)    Stage 3 chronic kidney disease (HCC)    Cervical spondylosis    Cervical herniated disc    Cervical myelopathy (HCC)  Resolved Problems:    * No resolved hospital problems. *    Blood pressure (!) 140/70, pulse (!) 44, temperature 97.5 °F (36.4 °C), temperature source Oral, resp. rate 16, height 5' 3\" (1.6 m), weight 189 lb (85.7 kg), SpO2 98 %, not currently breastfeeding. Subjective:  Symptoms:  Stable. She reports weakness.     Diet: Adequate intake. Activity level: Impaired due to weakness. Pain:  She complains of pain that is mild. Objective:  General Appearance: In no acute distress. Vital signs: (most recent): Blood pressure (!) 140/70, pulse (!) 44, temperature 97.5 °F (36.4 °C), temperature source Oral, resp. rate 16, height 5' 3\" (1.6 m), weight 189 lb (85.7 kg), SpO2 98 %, not currently breastfeeding. Vital signs are normal.    Output: Producing urine and producing stool. Lungs:  Normal effort and normal respiratory rate. Breath sounds clear to auscultation. Heart: Normal rate. Regular rhythm. S1 normal and S2 normal.    Abdomen: Abdomen is soft. Bowel sounds are normal.   There is no abdominal tenderness. Extremities: Normal range of motion. Neurological: Patient is alert. (4/5 strength). Assessment:    Condition: In stable condition. Improving. (Cervical myelopathy). Plan:   Encourage ambulation. (Moving better  Pain is diminished  She is off the Decadron  Sugars are still high  I will continue with the sliding scale insulin for now and see how she continues to decrease being off the steroids).        Sienna Rivers MD  8/30/2020

## 2020-08-31 LAB
METER GLUCOSE: 139 MG/DL (ref 74–99)
METER GLUCOSE: 140 MG/DL (ref 74–99)
METER GLUCOSE: 144 MG/DL (ref 74–99)
METER GLUCOSE: 229 MG/DL (ref 74–99)

## 2020-08-31 PROCEDURE — 6370000000 HC RX 637 (ALT 250 FOR IP): Performed by: PHYSICIAN ASSISTANT

## 2020-08-31 PROCEDURE — 97535 SELF CARE MNGMENT TRAINING: CPT

## 2020-08-31 PROCEDURE — 82962 GLUCOSE BLOOD TEST: CPT

## 2020-08-31 PROCEDURE — 97530 THERAPEUTIC ACTIVITIES: CPT

## 2020-08-31 PROCEDURE — 1280000000 HC REHAB R&B

## 2020-08-31 PROCEDURE — 97110 THERAPEUTIC EXERCISES: CPT

## 2020-08-31 RX ORDER — GLIMEPIRIDE 2 MG/1
2 TABLET ORAL
Status: DISCONTINUED | OUTPATIENT
Start: 2020-08-31 | End: 2020-09-02 | Stop reason: HOSPADM

## 2020-08-31 RX ADMIN — SUMATRIPTAN SUCCINATE 100 MG: 50 TABLET ORAL at 08:04

## 2020-08-31 RX ADMIN — BUTALBITAL, ACETAMINOPHEN, AND CAFFEINE 1 TABLET: 50; 325; 40 TABLET ORAL at 15:43

## 2020-08-31 RX ADMIN — BUMETANIDE 2 MG: 1 TABLET ORAL at 20:41

## 2020-08-31 RX ADMIN — CYCLOBENZAPRINE 10 MG: 10 TABLET, FILM COATED ORAL at 08:03

## 2020-08-31 RX ADMIN — INSULIN LISPRO 2 UNITS: 100 INJECTION, SOLUTION INTRAVENOUS; SUBCUTANEOUS at 20:43

## 2020-08-31 RX ADMIN — ATORVASTATIN CALCIUM 40 MG: 40 TABLET, FILM COATED ORAL at 20:41

## 2020-08-31 RX ADMIN — OXYCODONE HYDROCHLORIDE 10 MG: 10 TABLET ORAL at 15:43

## 2020-08-31 RX ADMIN — MESALAMINE 800 MG: 400 CAPSULE, DELAYED RELEASE ORAL at 20:41

## 2020-08-31 RX ADMIN — BUMETANIDE 2 MG: 1 TABLET ORAL at 08:04

## 2020-08-31 RX ADMIN — LOSARTAN POTASSIUM 50 MG: 50 TABLET, FILM COATED ORAL at 08:04

## 2020-08-31 RX ADMIN — OXYCODONE HYDROCHLORIDE 10 MG: 10 TABLET ORAL at 08:03

## 2020-08-31 RX ADMIN — GABAPENTIN 400 MG: 400 CAPSULE ORAL at 13:49

## 2020-08-31 RX ADMIN — ESCITALOPRAM 20 MG: 10 TABLET, FILM COATED ORAL at 05:54

## 2020-08-31 RX ADMIN — GABAPENTIN 400 MG: 400 CAPSULE ORAL at 08:03

## 2020-08-31 RX ADMIN — GABAPENTIN 400 MG: 400 CAPSULE ORAL at 20:42

## 2020-08-31 RX ADMIN — INSULIN LISPRO 2 UNITS: 100 INJECTION, SOLUTION INTRAVENOUS; SUBCUTANEOUS at 15:43

## 2020-08-31 RX ADMIN — MESALAMINE 800 MG: 400 CAPSULE, DELAYED RELEASE ORAL at 13:49

## 2020-08-31 RX ADMIN — DILTIAZEM HYDROCHLORIDE 120 MG: 120 CAPSULE, COATED, EXTENDED RELEASE ORAL at 08:04

## 2020-08-31 RX ADMIN — MESALAMINE 800 MG: 400 CAPSULE, DELAYED RELEASE ORAL at 08:04

## 2020-08-31 RX ADMIN — POTASSIUM CHLORIDE 20 MEQ: 1500 TABLET, EXTENDED RELEASE ORAL at 08:03

## 2020-08-31 ASSESSMENT — PAIN SCALES - GENERAL
PAINLEVEL_OUTOF10: 4
PAINLEVEL_OUTOF10: 7
PAINLEVEL_OUTOF10: 3
PAINLEVEL_OUTOF10: 9
PAINLEVEL_OUTOF10: 0

## 2020-08-31 ASSESSMENT — PAIN DESCRIPTION - PAIN TYPE
TYPE: CHRONIC PAIN
TYPE: CHRONIC PAIN

## 2020-08-31 ASSESSMENT — PAIN DESCRIPTION - PROGRESSION
CLINICAL_PROGRESSION: NOT CHANGED
CLINICAL_PROGRESSION: NOT CHANGED

## 2020-08-31 ASSESSMENT — PAIN DESCRIPTION - LOCATION
LOCATION: HEAD;NECK
LOCATION: HEAD;NECK

## 2020-08-31 ASSESSMENT — PAIN DESCRIPTION - DESCRIPTORS
DESCRIPTORS: ACHING;DISCOMFORT;CONSTANT
DESCRIPTORS: ACHING;DISCOMFORT;SORE

## 2020-08-31 ASSESSMENT — PAIN DESCRIPTION - FREQUENCY
FREQUENCY: CONTINUOUS
FREQUENCY: CONTINUOUS

## 2020-08-31 ASSESSMENT — PAIN DESCRIPTION - ONSET
ONSET: ON-GOING
ONSET: ON-GOING

## 2020-08-31 NOTE — PROGRESS NOTES
(ZOFRAN) injection 4 mg, 4 mg, Intravenous, Q6H PRN  sennosides-docusate sodium (SENOKOT-S) 8.6-50 MG tablet 1 tablet, 1 tablet, Oral, BID  sodium chloride flush 0.9 % injection 10 mL, 10 mL, Intravenous, 2 times per day  sodium chloride flush 0.9 % injection 10 mL, 10 mL, Intravenous, PRN  dextrose 5 % solution, 100 mL/hr, Intravenous, PRN  dextrose 50 % IV solution, 12.5 g, Intravenous, PRN  glucagon (rDNA) injection 1 mg, 1 mg, Intramuscular, PRN  glucose (GLUTOSE) 40 % oral gel 15 g, 15 g, Oral, PRN  atorvastatin (LIPITOR) tablet 40 mg, 40 mg, Oral, Daily  benzocaine-menthol (CEPACOL SORE THROAT) lozenge 1 lozenge, 1 lozenge, Oral, Q2H PRN  bumetanide (BUMEX) tablet 2 mg, 2 mg, Oral, BID  butalbital-acetaminophen-caffeine (FIORICET, ESGIC) per tablet 1 tablet, 1 tablet, Oral, Q4H PRN  cyclobenzaprine (FLEXERIL) tablet 10 mg, 10 mg, Oral, TID PRN  dilTIAZem (CARDIZEM CD) extended release capsule 120 mg, 120 mg, Oral, Daily  escitalopram (LEXAPRO) tablet 20 mg, 20 mg, Oral, Daily  gabapentin (NEURONTIN) capsule 400 mg, 400 mg, Oral, TID  insulin lispro (HUMALOG) injection vial 0-12 Units, 0-12 Units, Subcutaneous, TID WC  insulin lispro (HUMALOG) injection vial 0-6 Units, 0-6 Units, Subcutaneous, Nightly  losartan (COZAAR) tablet 50 mg, 50 mg, Oral, Daily  mesalamine (DELZICOL) delayed release capsule 800 mg, 800 mg, Oral, TID  potassium chloride (KLOR-CON M) extended release tablet 20 mEq, 20 mEq, Oral, Daily  SUMAtriptan (IMITREX) tablet 100 mg, 100 mg, Oral, Daily PRN  senna (SENOKOT) tablet 8.6 mg, 1 tablet, Oral, Daily PRN    ASSESSMENT:   · 60year old female s/p ACDF - stable    PLAN:  · PT/OT  · F/u in clinic 4 weeks post op      Electronically signed by ROBBIE Napoles on 8/31/2020 at 7:29 AM

## 2020-08-31 NOTE — DISCHARGE SUMMARY
Discharge Summary    [facility] SUMMARY:                The patient is a 60 y.o. female who was admitted to the hospital on 8/24/2020 12:56 PM for treatment of cervical stenosis. On the day of admission, a cervical fusion was performed. The patient's hospital course was uncomplicated and consisted of physical therapy, incision observation, and a return to normal oral intake. The patient was discharged on 8/28/2020  3:41 PM tolerating a diet, moving bowels, and urinating without difficulty. The incisions were clean and intact. The patient was discharged to rehab in satisfactory condition with instructions to call the office for a follow up appointment. Hospital Problem List:  Principal Problem:    Cervical herniated disc  Active Problems:    Essential hypertension    Type 2 diabetes mellitus with renal complication (HCC)    Stage 3 chronic kidney disease (HCC)    HLD (hyperlipidemia)    Hx-TIA (transient ischemic attack)    Frequent falls  Resolved Problems:    * No resolved hospital problems. *     Procedure(s) (LRB):  C4-C5, C5-C6, C6-C7 ANTERIOR CERVICAL DISCECTOMY FUSION --ARM, REG BED  HORSE SHOE, PLATES, SCREWS, GLOBUS (N/A)    Discharge Medications:    Zuri Munguia   [place] Medication Instructions FDI:[id number]    Printed on:08/31/20 4861   Medication Information                      allopurinol (ZYLOPRIM) 100 MG tablet  Take 100 mg by mouth daily              ASACOL  MG TBEC TBEC tablet    800 mg 3 times daily              atorvastatin (LIPITOR) 40 MG tablet  Take 40 mg by mouth daily.              B Complex Vitamins (VITAMIN B COMPLEX) TABS  Take 1 tablet by mouth daily              bumetanide (BUMEX) 1 MG tablet  TAKE 2 TABLETS TWICE A DAY             butalbital-acetaminophen-caffeine (FIORICET, ESGIC) -40 MG per tablet  Take 1 tablet by mouth every 4 hours as needed for Headaches             Cholecalciferol (VITAMIN D PO)  Take 1,000 Units by mouth daily cyclobenzaprine (FLEXERIL) 10 MG tablet  Take 1 tablet by mouth 3 times daily as needed for Muscle spasms (For Crohn's flare up)             diltiazem (CARDIZEM CD) 120 MG ER capsule  TAKE 1 CAPSULE DAILY             Erenumab-aooe (AIMOVIG) 140 MG/ML SOAJ  Inject 140 mg into the skin every 30 days I box  Lot: [id number] exp 5/2021             Erenumab-aooe (AIMOVIG) 140 MG/ML SOAJ  Inject 140 mg into the skin every 30 days             escitalopram (LEXAPRO) 20 MG tablet  Take 1 tablet by mouth Daily             gabapentin (NEURONTIN) 400 MG capsule  Take 1 capsule by mouth 3 times daily for 90 days. losartan (COZAAR) 50 MG tablet  Take 1 tablet by mouth daily             Multiple Vitamins-Minerals (WOMENS ONE DAILY PO)  Take 1 tablet by mouth daily              oxyCODONE (ROXICODONE) 5 MG immediate release tablet  Take 1 tablet by mouth every 6 hours as needed for Pain for up to 7 days. potassium chloride SA (K-DUR;KLOR-CON M) 20 MEQ tablet  Take 1 tablet by mouth daily.              PROVENTIL  (90 BASE) MCG/ACT inhaler  Inhale 1 puff into the lungs every 4 hours as needed              rizatriptan (MAXALT) 10 MG tablet  Take 1 tablet by mouth daily as needed for Migraine May repeat dose in 2 hours if first dose ineffective; no more than 2 doses in 24 hours                 Florencio Mg  8/31/2020

## 2020-08-31 NOTE — PROGRESS NOTES
Occupational Therapy  OCCUPATIONAL THERAPY DAILY NOTE    Date:2020  Patient Name: Liset Lee  MRN: [id number]  : 1965  Room: [address] [place] [address]     Patient Active Problem List   Diagnosis    L-S radiculopathy    Migraine without aura and without status migrainosus, not intractable    Essential hypertension    Type 2 diabetes mellitus with renal complication (HCC)    Stage 3 chronic kidney disease ([facility] [address]    HLD (hyperlipidemia)    Closed displaced fracture of head of left radius    Hx-TIA (transient ischemic attack)    Cervical spondylosis    Frequent falls    Cervical herniated disc    Cervical myelopathy (HCC)     Diagnosis: Cervical Myelopathy    Precautions: Fall Risk, cervical precautions, cervical collar, dental soft & carb controlled diet    Functional Assessment:   Date Status AE  Comments   Feeding 20 S/U     Grooming 20 S     Bathing 20 UE: SBA  LE: CGA     UB Dressing 20 SBA/ s/u     LB Dressing 20 SUP      Homemaking 20 S  Pt completed simulated dog feeding activity. Pt scooped rice from one bin and placed it into bowl. Pt lifted bowl up as she would a dog's water dish. S for safety. Pt retrieved can of pop for herself and ice from ice machine. Functional Transfers / Balance:   Date Status DME  Comments   Sit Balance 20 Mod A     Stand Balance 20 Independent     ? Tub  X Shower   Transfer 20 S  2'' step over   Commode   Transfer 20  Mod I       Functional   Mobility 20 Supervision/ ,Mod I  hurricane    Other:    Sit <> stand:      Supine <> sit:     20     S      SBA           Bed rails     From arm chair 3xs    Pt performed sit to supine transfer      Functional Exercises / Activity:  Mod resistive dewayne bar X 25 on 4 planes to increase BUE forearm, wrist and  strength for independence with functionalt asks.      Leisure task with focus on stand balance, stand tolerance, endurance and BUE strength for independence with functional tasks. Pt stood and completed various puzzles with 1# wrist weights on BUEs. Mod I for stand balance at table top and demo'd fair stand tolerance. Pt completed X 2 stands. Sensory / Neuromuscular Re-Education:      Cognitive Skills:   Status Comments   Problem   Solving Good    Memory Fair+    Sequencing Good    Safety Good      Visual Perception:    Education:  Educated pt on activity pacing and energy conservation techniques to prevent falls/ decrease fatigue during ADLs. Pt also educated on use of reacher to decrease difficulty during ADLs/IADLs. ? Family teach completed on:    Pain Level: No c/o pain    Additional Notes:       Patient has made good progress during treatment sessions toward set goals. Therapy emphasis to obtain goals: To increase independence with ADLs/ IADLs, transfers, fucntional mobility and improve activity tolerance. ? Continue with current OT Plan of care. ? Prepare for Discharge    Goals  Long term goals  Time Frame for Long term goals : 5 days to address above probem areas  Long term goal 1: Pt demo independent to eat all meals  Long term goal 2: Pt demo Mod I to stand @ sink for grooming & hygiene  Long term goal 3: Pt demo Sup bathing @ sink levle or shower level with AE as needed  Long term goal 4: Pt demo Mod I UE/LE dress  Long term goal 5: Pt demo Mod I commode trf with appropriate DME to esnure pt safety  Long term goals 6: Pt demo SUp walk in shower trf with approrpaite DME to esnure pt safety  Long term goal 7: Pt demo Mod I light homemaking activity  Long term goal 8: Pt demo G endurance for a 30 minute functional activity  Long term goal 9: Pt will state & adhere to precautions during ADL & functioanlly based tasks  Patient Goals   Patient goals : \"To be able to do my stairs, maneuver around the house. \"       DISCHARGE RECOMMENDATIONS  Recommended DME:    Post Discharge Care:   ? Home Independently  ? Home with 24hr Care / Supervision ? Home with

## 2020-08-31 NOTE — CARE COORDINATION
//Per therapies - patient is ready for d/c. Dr. Modena Duverney PT to plan for Wed 9/4. Met with patient. She is a green dot. DME - patient has straight cane and has a tub seat in walk in shower which she will plan to use. Referral to [facility] for Home PT. The Plan for Transition of Care is related to the following treatment goals: Home [place] [id number]    The Patient was provided with a choice of provider and agrees   with the discharge plan. [x] Yes [] No    Freedom of choice list was provided with basic dialogue that supports the patient's individualized plan of care/goals, treatment preferences and shares the quality data associated with the providers.  [x] Yes [] No    Earnestine Coombs

## 2020-08-31 NOTE — HOME CARE
[facility] received referral. Will follow after discharge. Spoke with patient and verified demographics.  Yolis Sung LPN, [facility]

## 2020-08-31 NOTE — PROGRESS NOTES
Physical Therapy    Facility/Department: [address] REHAB  Treatment Note     NAME: Alana Mercado  : 1965  MRN: [id number]    Date of Service: 2020  Evaluating Therapist: Marguerite Flores DPT    ROOM: [address] [place] [address]  DIAGNOSIS: Cervical Myelopathy   PMH: s/p C4-C7 ACDF on 20. PMH include anemia, anxiety, hx CVA, CKD, COPD, chron's disease, DM, HTN, HLD, obesity, raynaud's disease, LEONOR, tuberculosis. PRECAUTIONS: cervical precautions, C-collar    Patient lives alone alan mutli-level house with 3 steps to enter without HR. Pt reporting friends/neighbors will assist upon D/C. 7-8 steps with bilateral HR to second floor bed/bathroom. Patient ambulated with cane PTA. States she recently has had multiple falls at home.        Initial Evaluation  20 AM PM Short Term Goals Long Term Goals    Was pt agreeable to Eval/treatment? yes yes yes     Does pt have pain?  7/10 shoulders/neck  7/10 shoulders/neck 7/10 shoulders/neck      Bed Mobility  Rolling: mod I   Supine to sit: mod I   Sit to supine: mod I   Scooting: mod I  Independent  Independent      Transfers Sit to stand: sup  Stand to sit: sup  Stand pivot: sup with spc Mod I with spc Mod I with spc  Sup with AAD Mod I with AAD   Ambulation    250 feet with spc with sup >1000' with spc mod I  >1000' with spc mod I  >500 feet with AAD with sup >1000 feet with AAD with mod I   Walking 10 feet on uneven surface 10 feet with spc with sup >1000' with spc mod I  >1000' with spc mod I  >500 feet with AAD with sup >1000 feet with AAD with mod I   Wheel Chair Mobility n/a       Car Transfers sup  Mod I  Mod I   Stair negotiation: ascended and descended  12 steps with one rail and spc with sba/sup (recip patterning)  16 steps with HR and spc mod I (recip patterning) 16 steps with HR and spc mod I (recip patterning) 12 steps without rail with sup >12 steps without rail with mod I   Curb Step:   ascended and descended 7.5 inch step with AAD and sba/sup  7.5 in curb step with spc mod I  10 in curb step with spc sup x 4 reps >9 inch step with AAD and sup >9 inch step with AAD and mod I   Picking up object off the floor Picked up 1# with mod I        BLE ROM WFL       BLE Strength 4+/5 bilateral LE       Balance  Sitting: Independent standing: sup with spc  Ambriz 54/56 Low fall risk        Date Family Teach Completed        Is additional Family Teaching Needed?   Y or N        Hindering Progress Higher level balance       PT recommended ELOS 2-3 days       Team's Discharge Plan        Therapist at Team Meeting          Therapeutic Exercise:   AM: 10x STS transfer at w/c, no cues for hadn placement  Stepping over various height quad canes landing on various uneven surfaces (bosu, airex paid): NR patterning 10' x2, alternating NR patterning 10' x2, side stepping 10' x2, BW 10' x2, recip patterning 10' x2  Braiding: FW/BW 48' x2 bilateral directions  Bosu: standing on soft side Eo, EO with perturbations, EC with increased sway/LOB with EC   Uneven surface ambulation without AD: ambulation on grass, cement, graded surfaces, curbs, ramps, mulch, rocks with no LOB >1000' with spc    PM:10x STS transfer at w/c, no cues for hadn placement  Uneven surface ambulation without AD: ambulation on grass, cement, graded surfaces, curbs, ramps, mulch, rocks with no LOB >1000' with spc  10 in curb step with spc NR patterning x4 reps  Bosu: standing on hard side reaching for objects (spinal neutrality) with emphasis on various UE grasp patterning with mini squat (5x5 reps)  Braiding bilateral directions front/back 50' x2  Backwards ambulation 100' without LOB  Golf bend (maintaining precautions) tapping knee height object with SLS x10 reps    Patient education  Pt educated on safety with mobility, compensatory balance strategies    Patient response to education:   Pt verbalized understanding Pt demonstrated skill Pt requires further education in this area   yes yes reinforcement     Additional Comments: Reviewed all mobility as per above. Completed uneven surface ambulation around the hospital on various surfaces with spc as per above with no Lob and good safety awareness. Continues to challenge higher level balance with functional mobility. Anticipate green dot today. In PM, continued to challenge balance with both higher level SLS balance tasks, as well as reactionary ankle/hip strategies with dynamic displacement of JOEL. No Lob with functional mobility. Issued green dot with OT collaboration. Nursing notified. AM  Time in: 0915  Time out: 1000    PM  Time in: 1300  Time out: 1063    Pt is making good progress toward established Physical Therapy goals. Continue with physical therapy current plan of care.     Moni Zamora DPT  [id number]

## 2020-08-31 NOTE — CARE COORDINATION
Social Work Assessment Summary    PCP: Eunice    Patient Resides: Patient lives alone     Home Architecture : Pt lives in a split level home with 4 floors. To enter The main entrance has 3 steps and railings are being put up before pt returns home. The main level has kitchen and living room. There are 7 steps with 2 rails to get upstairs to bedroom and 7 steps and 2 rails to get down to den and bathroom area. Pt has a walk in shower with doors that she uses also has a shower chair. Pt laundry is 7 steps down from the den and bathroom level. Will you return to your own home? yes        Primary Caregiver: Socorro Zamudio, 45, adopted daughter. Lives next door to pt with her  and children. 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 she is healthy and drives. Pt has 2 sons Filipe Todd ,32, works and lives in [place]. Anabel Boswell 45, lives in [place]. Level of Function PTA : Pt was independent in all areas except driving with assistance from her straight cane or walker. Pt is oriented. 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 Pt has not worked since her fall. Receives SSD yes Reason: stroke in 2016, began receiving benefits in 2017     DME Pta  : straight cane, std. Mandi Coronel shower chair    Community Agency Involvement PTA : Outpatient PT at [facility] in [facility]. Do they have a medical profile: no    Family Teaching: to be scheduled    Strength: pt says she is very positive and determined    Preference: pt says \"strengthen on stairs, getting around, and showering well. \"      NAME RELATION HOME # WORK # CELL #   Jag Elsa  daughter  Rick Mares [phone]   Filipe Lovell  [phone]   Anabel Boswell son [place]        Height: 5'3  Weight: [address], SW Intern  Bernard Landis  8/31/2020

## 2020-08-31 NOTE — PLAN OF CARE
No change to Rx. Patient is alert and oriented x4. VS within normal limits pleasant and participative with this nursing staff for assessment and direct patient care. Patient is motivated to be out of here within the week per her self report. Patient is actively utilizing pincher tool for applying teds/ shoes and maintaining cervical collar per order. ISS given and checked per order. RR equal and unlabored will continue to monitor.

## 2020-08-31 NOTE — PLAN OF CARE
Since time of last note patient has required 1 dose of flexeril for \"spasmic neck pain\" C-Collar compliance met this shift, will continue to monitor. Patient is satisfied with care and is call bell compliant for this nursing staff.

## 2020-08-31 NOTE — PROGRESS NOTES
08/31/20 1437   Attendance   Activity Puzzles  (Johny/Melindaearch)   Participation Active participation   [place] Demonstrates ability to complete social goals   Social Skills Interacts independently in social activity   Leisure Education Demonstrates knowledge of benefits of leisure involvement  Luke Mendoza identified concentration,using my hands & nice to do it)   Time Spent With Patient   Minutes 45

## 2020-09-01 LAB
METER GLUCOSE: 119 MG/DL (ref 74–99)
METER GLUCOSE: 129 MG/DL (ref 74–99)
METER GLUCOSE: 133 MG/DL (ref 74–99)
METER GLUCOSE: 149 MG/DL (ref 74–99)

## 2020-09-01 PROCEDURE — 82962 GLUCOSE BLOOD TEST: CPT

## 2020-09-01 PROCEDURE — 97535 SELF CARE MNGMENT TRAINING: CPT

## 2020-09-01 PROCEDURE — 97110 THERAPEUTIC EXERCISES: CPT

## 2020-09-01 PROCEDURE — 6370000000 HC RX 637 (ALT 250 FOR IP): Performed by: PHYSICIAN ASSISTANT

## 2020-09-01 PROCEDURE — 97530 THERAPEUTIC ACTIVITIES: CPT

## 2020-09-01 PROCEDURE — 6370000000 HC RX 637 (ALT 250 FOR IP): Performed by: PHYSICAL MEDICINE & REHABILITATION

## 2020-09-01 PROCEDURE — 1280000000 HC REHAB R&B

## 2020-09-01 RX ADMIN — INSULIN LISPRO 2 UNITS: 100 INJECTION, SOLUTION INTRAVENOUS; SUBCUTANEOUS at 06:29

## 2020-09-01 RX ADMIN — ESCITALOPRAM 20 MG: 10 TABLET, FILM COATED ORAL at 06:28

## 2020-09-01 RX ADMIN — DILTIAZEM HYDROCHLORIDE 120 MG: 120 CAPSULE, COATED, EXTENDED RELEASE ORAL at 08:14

## 2020-09-01 RX ADMIN — GABAPENTIN 400 MG: 400 CAPSULE ORAL at 08:13

## 2020-09-01 RX ADMIN — BUMETANIDE 2 MG: 1 TABLET ORAL at 08:14

## 2020-09-01 RX ADMIN — OXYCODONE HYDROCHLORIDE 10 MG: 10 TABLET ORAL at 14:35

## 2020-09-01 RX ADMIN — LOSARTAN POTASSIUM 50 MG: 50 TABLET, FILM COATED ORAL at 08:14

## 2020-09-01 RX ADMIN — MESALAMINE 800 MG: 400 CAPSULE, DELAYED RELEASE ORAL at 13:51

## 2020-09-01 RX ADMIN — GABAPENTIN 400 MG: 400 CAPSULE ORAL at 13:52

## 2020-09-01 RX ADMIN — SENNOSIDES AND DOCUSATE SODIUM 1 TABLET: 8.6; 5 TABLET ORAL at 21:11

## 2020-09-01 RX ADMIN — BUMETANIDE 2 MG: 1 TABLET ORAL at 21:09

## 2020-09-01 RX ADMIN — GLIMEPIRIDE 2 MG: 2 TABLET ORAL at 08:13

## 2020-09-01 RX ADMIN — POTASSIUM CHLORIDE 20 MEQ: 1500 TABLET, EXTENDED RELEASE ORAL at 08:11

## 2020-09-01 RX ADMIN — MESALAMINE 800 MG: 400 CAPSULE, DELAYED RELEASE ORAL at 08:13

## 2020-09-01 RX ADMIN — GABAPENTIN 400 MG: 400 CAPSULE ORAL at 21:09

## 2020-09-01 RX ADMIN — CYCLOBENZAPRINE 10 MG: 10 TABLET, FILM COATED ORAL at 11:04

## 2020-09-01 RX ADMIN — ATORVASTATIN CALCIUM 40 MG: 40 TABLET, FILM COATED ORAL at 21:09

## 2020-09-01 RX ADMIN — OXYCODONE HYDROCHLORIDE 10 MG: 10 TABLET ORAL at 08:20

## 2020-09-01 RX ADMIN — MESALAMINE 800 MG: 400 CAPSULE, DELAYED RELEASE ORAL at 21:09

## 2020-09-01 ASSESSMENT — PAIN DESCRIPTION - PAIN TYPE: TYPE: ACUTE PAIN

## 2020-09-01 ASSESSMENT — PAIN DESCRIPTION - PROGRESSION
CLINICAL_PROGRESSION: NOT CHANGED
CLINICAL_PROGRESSION: NOT CHANGED

## 2020-09-01 ASSESSMENT — PAIN SCALES - GENERAL
PAINLEVEL_OUTOF10: 6
PAINLEVEL_OUTOF10: 2
PAINLEVEL_OUTOF10: 7
PAINLEVEL_OUTOF10: 4
PAINLEVEL_OUTOF10: 0

## 2020-09-01 ASSESSMENT — PAIN - FUNCTIONAL ASSESSMENT: PAIN_FUNCTIONAL_ASSESSMENT: PREVENTS OR INTERFERES SOME ACTIVE ACTIVITIES AND ADLS

## 2020-09-01 ASSESSMENT — PAIN DESCRIPTION - DESCRIPTORS: DESCRIPTORS: ACHING;DISCOMFORT

## 2020-09-01 ASSESSMENT — PAIN DESCRIPTION - FREQUENCY: FREQUENCY: INTERMITTENT

## 2020-09-01 ASSESSMENT — PAIN DESCRIPTION - ORIENTATION: ORIENTATION: ANTERIOR

## 2020-09-01 ASSESSMENT — PAIN DESCRIPTION - ONSET: ONSET: ON-GOING

## 2020-09-01 ASSESSMENT — PAIN DESCRIPTION - LOCATION: LOCATION: HEAD;NECK

## 2020-09-01 NOTE — PROGRESS NOTES
Shift report received from off going 7a-7p shift RN no change of condition reported Patient sitting up in chair in room calm and cooperative with staff no c/o verbalized at this time.  Scheduled for d/c 9/2 up adlib will continue to monitor

## 2020-09-01 NOTE — PROGRESS NOTES
Occupational Therapy  OCCUPATIONAL THERAPY DAILY NOTE    Date:2020  Patient Name: Sherrie Harris  MRN: [id number]  : 1965  Room: [address] [place] [address]     Patient Active Problem List   Diagnosis    L-S radiculopathy    Migraine without aura and without status migrainosus, not intractable    Essential hypertension    Type 2 diabetes mellitus with renal complication (HCC)    Stage 3 chronic kidney disease ([facility] [address]    HLD (hyperlipidemia)    Closed displaced fracture of head of left radius    Hx-TIA (transient ischemic attack)    Cervical spondylosis    Frequent falls    Cervical herniated disc    Cervical myelopathy (HCC)     Diagnosis: Cervical Myelopathy    Precautions: Fall Risk, cervical precautions, cervical collar, dental soft & carb controlled diet    Functional Assessment:   Date Status AE  Comments   Feeding 20 S/U     Grooming 20 Mod I/Sup  Pt completed grooming tasks with good follow thru with cervical precautions. Bathing 20 S/SBA-  UB/LB skills however   Min A- washing hair due to cervical precautions. Shower  LH hose  Pt completed UB bathing following cervical precautions. LB bathing using BLE crossover method seated on shower stall bench. Pt required assist to wash long hair due to neck precautions. UB Dressing 20 Mod I/Sup  To james bra, beltran and button down blouse    LB Dressing 20  Mod I/Sup  Pt able to james underwear, pants and shoes while seated in arm chair. Pt used BLE crossover method with socks and shoes. Homemaking 20 S       Functional Transfers / Balance:   Date Status DME  Comments   Sit Balance 20 Mod I/Sup  Sitting balance in bedside chair, arm chair and shower stall bench. Stand Balance 20 Independent No device  Standing balance during fxl mobility, transfers and clothing mgmt. ? Tub  X Shower   Transfer 20 Sup Shower stall and grab bar  Pt entered/exited shower stall using grab bar for safety.     Commode   Transfer 20  Mod I       Functional   Mobility 9/1/20 Mod I hurricane Pt used hurricane when ambulating in room and up/down hallway. Other:    Sit <> stand:      Supine <> sit:     9/1/20 8/30/20     Mod I      SBA           Bed rails     Sit to stand transfers on/off bedside recliner and arm chair           Functional Exercises / Activity:  Pt completed am ADL;s including shower while following cervical precautions needing assistance to wash hair for joint protection with patient demo good follow thru during dressing, bathing and grooming tasks. 2nd session:  Pt engaged in [address] [place] [address] ex's using beige putty/wheel on incline wedge to increase shld/scapula/elbow ROM along with strengthening for ADL;s, transfers and fxl mobility with good tolerance. Pt able to partially comb out hair  while seated to increase grooming skills with assist with back due to long length and neck precautions. Sensory / Neuromuscular Re-Education:      Cognitive Skills:   Status Comments   Problem   Solving Good    Memory good    Sequencing Good    Safety Good      Visual Perception:    Education:  Pt educated with neck precautions during dressing, bathing, hair washing, grooming to improve ADL:s along with transfers. ? Family teach completed on:    Pain Level: No c/o pain    Additional Notes:       Patient has made good progress during treatment sessions toward set goals. Therapy emphasis to obtain goals: To increase independence with ADLs/ IADLs, transfers, fucntional mobility and improve activity tolerance. x Continue with current OT Plan of care. ?  Prepare for Discharge    Goals  Long term goals  Time Frame for Long term goals : 5 days to address above probem areas  Long term goal 1: Pt demo independent to eat all meals  Long term goal 2: Pt demo Mod I to stand @ sink for grooming & hygiene  Long term goal 3: Pt demo Sup bathing @ sink levle or shower level with AE as needed  Long term goal 4: Pt demo Mod I UE/LE dress  Long term goal 5: Pt demo Mod I commode trf with appropriate DME to esnure pt safety  Long term goals 6: Pt maxwell SUp walk in shower trf with approrpaite DME to esnure pt safety  Long term goal 7: Pt demo Mod I light homemaking activity  Long term goal 8: Pt maxwell HUA endurance for a 30 minute functional activity  Long term goal 9: Pt will state & adhere to precautions during ADL & functioanlly based tasks  Patient Goals   Patient goals : \"To be able to do my stairs, maneuver around the house. \"       DISCHARGE RECOMMENDATIONS  Recommended DME:    Post Discharge Care:   ? Home Independently  ? Home with 24hr Care / Supervision ? Home with Partial Supervision ? Home with Home Health OT ? Home with Out Pt OT ? Other: ___   Comments:         Time in Time out Tx Time Breakdown  Variance:   First Session  8:10am 9:25am x Individual Tx- 75min   Concurrent Tx -  Mins  ? Co-Tx -   ? Group Tx -   ? Time Missed -     Second Session 10:00am 10:45am X Individual Tx- 45 Mins  ? Concurrent Tx -  ? Co-Tx -   ? Group Tx -   ? Time Missed -     Third Session    ? Individual Tx-   ? Concurrent Tx -  ? Co-Tx -   ? Group Tx -   ? Time Missed -         Total Tx Time: 120 mins      [place] CALVIN/L [id number]  I have read the above note and agree with the documentation.   Carito Quesada OTR/L [id number]

## 2020-09-01 NOTE — PROGRESS NOTES
Progress Note    Subjective/   60y.o. year old female on the rehab unit for cervical myelopathy. She is tolerating therapy well. Therapy feels she is ready for discharge soon. No SOB or chest pain. No dizziness. No difficulty swallowing. Feels her speech is improving. Objective/   VITALS:  BP (!) 154/64   Pulse (!) 48   Temp 97.4 °F (36.3 °C) (Oral)   Resp 18   Ht 5' 3\" (1.6 m)   Wt 189 lb (85.7 kg)   SpO2 96%   BMI 33.48 kg/m²   24HR INTAKE/OUTPUT:      Intake/Output Summary (Last 24 hours) at 8/31/2020 2335  Last data filed at 8/31/2020 1730  Gross per 24 hour   Intake 1320 ml   Output --   Net 1320 ml     Constitutional:  Alert, awake, no apparent distress   Cardiovascular:  S1, S2 without m/r/g   Respiratory:  CTA B without w/r/r   Abdomen: +BS  Ext: no pitting LE edema  Neuro: awake and alert, good sitting balance, no tremor. Tone is normal.     Functional Level    Initial Evaluation  8/29/20 AM PM Short Term Goals Long Term Goals    Was pt agreeable to Eval/treatment? yes yes yes       Does pt have pain?  7/10 shoulders/neck  7/10 shoulders/neck 7/10 shoulders/neck        Bed Mobility  Rolling: mod I   Supine to sit: mod I   Sit to supine: mod I   Scooting: mod I  Independent  Independent        Transfers Sit to stand: sup  Stand to sit: sup  Stand pivot: sup with spc Mod I with spc Mod I with spc  Sup with AAD Mod I with AAD   Ambulation    250 feet with spc with sup >1000' with spc mod I  >1000' with spc mod I  >500 feet with AAD with sup >1000 feet with AAD with mod I   Walking 10 feet on uneven surface 10 feet with spc with sup >1000' with spc mod I  >1000' with spc mod I  >500 feet with AAD with sup >1000 feet with AAD with mod I   Wheel Chair Mobility n/a           Car Transfers sup   Mod I   Mod I   Stair negotiation: ascended and descended  12 steps with one rail and spc with sba/sup (recip patterning)  16 steps with HR and spc mod I (recip patterning) 16 steps with HR and CHOL, HDL in the last 72 hours. Invalid input(s): LDLCALCU  INR:   Recent Labs     08/29/20  0545   INR 1.1       Assessment/  Patient Active Problem List:     L-S radiculopathy     Migraine without aura and without status migrainosus, not intractable     Essential hypertension     Type 2 diabetes mellitus with renal complication (HCC)     Stage 3 chronic kidney disease (HCC)     HLD (hyperlipidemia)     Closed displaced fracture of head of left radius     Hx-TIA (transient ischemic attack)     Cervical spondylosis     Frequent falls     Cervical herniated disc     Cervical myelopathy (HCC)      Plan/  1. Restart Amaryl - was on this at home  2. Continue rehab   3. Continue to follow blood sugar  4. Increase activity as able  5. Mechanical dvt prophylaxis  6. Maintain collar as per neurosurgery  7.  Neurosurgery note appreciated          Herlinda Sanchez MD

## 2020-09-01 NOTE — PROGRESS NOTES
1000    PM  Time in: 1300  Time out: 1345    Pt is making good progress toward established Physical Therapy goals. Continue with physical therapy current plan of care.     Zachariah Olsen [id number] (AM)    Maksim Alejandre DPT  [id number] (PM)

## 2020-09-02 VITALS
RESPIRATION RATE: 16 BRPM | WEIGHT: 189 LBS | OXYGEN SATURATION: 97 % | HEIGHT: 63 IN | BODY MASS INDEX: 33.49 KG/M2 | HEART RATE: 58 BPM | SYSTOLIC BLOOD PRESSURE: 126 MMHG | TEMPERATURE: 98.3 F | DIASTOLIC BLOOD PRESSURE: 58 MMHG

## 2020-09-02 LAB
ANION GAP SERPL CALCULATED.3IONS-SCNC: 11 MMOL/L (ref 7–16)
BASOPHILS ABSOLUTE: 0.03 E9/L (ref 0–0.2)
BASOPHILS RELATIVE PERCENT: 0.3 % (ref 0–2)
BUN BLDV-MCNC: 17 MG/DL (ref 6–20)
CALCIUM SERPL-MCNC: 8.9 MG/DL (ref 8.6–10.2)
CHLORIDE BLD-SCNC: 102 MMOL/L (ref 98–107)
CO2: 31 MMOL/L (ref 22–29)
CREAT SERPL-MCNC: 0.9 MG/DL (ref 0.5–1)
EOSINOPHILS ABSOLUTE: 0.43 E9/L (ref 0.05–0.5)
EOSINOPHILS RELATIVE PERCENT: 3.9 % (ref 0–6)
GFR AFRICAN AMERICAN: >60
GFR NON-AFRICAN AMERICAN: >60 ML/MIN/1.73
GLUCOSE BLD-MCNC: 131 MG/DL (ref 74–99)
HCT VFR BLD CALC: 42.9 % (ref 34–48)
HEMOGLOBIN: 13.7 G/DL (ref 11.5–15.5)
IMMATURE GRANULOCYTES #: 0.23 E9/L
IMMATURE GRANULOCYTES %: 2.1 % (ref 0–5)
LYMPHOCYTES ABSOLUTE: 2.47 E9/L (ref 1.5–4)
LYMPHOCYTES RELATIVE PERCENT: 22.7 % (ref 20–42)
MCH RBC QN AUTO: 28.8 PG (ref 26–35)
MCHC RBC AUTO-ENTMCNC: 31.9 % (ref 32–34.5)
MCV RBC AUTO: 90.3 FL (ref 80–99.9)
METER GLUCOSE: 104 MG/DL (ref 74–99)
METER GLUCOSE: 120 MG/DL (ref 74–99)
MONOCYTES ABSOLUTE: 0.88 E9/L (ref 0.1–0.95)
MONOCYTES RELATIVE PERCENT: 8.1 % (ref 2–12)
NEUTROPHILS ABSOLUTE: 6.86 E9/L (ref 1.8–7.3)
NEUTROPHILS RELATIVE PERCENT: 62.9 % (ref 43–80)
PDW BLD-RTO: 13.5 FL (ref 11.5–15)
PLATELET # BLD: 243 E9/L (ref 130–450)
PMV BLD AUTO: 10.5 FL (ref 7–12)
POTASSIUM SERPL-SCNC: 3.6 MMOL/L (ref 3.5–5)
RBC # BLD: 4.75 E12/L (ref 3.5–5.5)
SODIUM BLD-SCNC: 144 MMOL/L (ref 132–146)
WBC # BLD: 10.9 E9/L (ref 4.5–11.5)

## 2020-09-02 PROCEDURE — 97110 THERAPEUTIC EXERCISES: CPT

## 2020-09-02 PROCEDURE — 80048 BASIC METABOLIC PNL TOTAL CA: CPT

## 2020-09-02 PROCEDURE — 6370000000 HC RX 637 (ALT 250 FOR IP): Performed by: PHYSICIAN ASSISTANT

## 2020-09-02 PROCEDURE — 97530 THERAPEUTIC ACTIVITIES: CPT

## 2020-09-02 PROCEDURE — 85025 COMPLETE CBC W/AUTO DIFF WBC: CPT

## 2020-09-02 PROCEDURE — 6370000000 HC RX 637 (ALT 250 FOR IP): Performed by: PHYSICAL MEDICINE & REHABILITATION

## 2020-09-02 PROCEDURE — 36415 COLL VENOUS BLD VENIPUNCTURE: CPT

## 2020-09-02 PROCEDURE — 82962 GLUCOSE BLOOD TEST: CPT

## 2020-09-02 RX ORDER — SENNA AND DOCUSATE SODIUM 50; 8.6 MG/1; MG/1
1 TABLET, FILM COATED ORAL 2 TIMES DAILY
Qty: 60 TABLET | Refills: 1 | Status: SHIPPED | OUTPATIENT
Start: 2020-09-02 | End: 2021-01-27

## 2020-09-02 RX ORDER — SODIUM PHOSPHATE, DIBASIC AND SODIUM PHOSPHATE, MONOBASIC 7; 19 G/133ML; G/133ML
1 ENEMA RECTAL DAILY PRN
Qty: 1 BOTTLE | Refills: 0 | COMMUNITY
Start: 2020-09-02 | End: 2021-01-27

## 2020-09-02 RX ORDER — POLYETHYLENE GLYCOL 3350 17 G/17G
17 POWDER, FOR SOLUTION ORAL DAILY
Qty: 527 G | Refills: 1 | COMMUNITY
Start: 2020-09-02 | End: 2020-10-02

## 2020-09-02 RX ORDER — OXYCODONE HYDROCHLORIDE 5 MG/1
5 TABLET ORAL EVERY 4 HOURS PRN
Qty: 42 TABLET | Refills: 0
Start: 2020-09-02 | End: 2020-09-22 | Stop reason: SDUPTHER

## 2020-09-02 RX ORDER — CYCLOBENZAPRINE HCL 10 MG
10 TABLET ORAL 3 TIMES DAILY PRN
Qty: 15 TABLET | Refills: 1 | Status: SHIPPED | OUTPATIENT
Start: 2020-09-02 | End: 2020-09-12

## 2020-09-02 RX ORDER — GLIMEPIRIDE 2 MG/1
2 TABLET ORAL
Qty: 30 TABLET | Refills: 0 | Status: SHIPPED | OUTPATIENT
Start: 2020-09-02

## 2020-09-02 RX ORDER — OXYCODONE HYDROCHLORIDE 10 MG/1
10 TABLET ORAL EVERY 6 HOURS PRN
Qty: 28 TABLET | Refills: 0 | Status: SHIPPED | OUTPATIENT
Start: 2020-09-02 | End: 2020-09-02 | Stop reason: HOSPADM

## 2020-09-02 RX ADMIN — BUMETANIDE 2 MG: 1 TABLET ORAL at 09:13

## 2020-09-02 RX ADMIN — MESALAMINE 800 MG: 400 CAPSULE, DELAYED RELEASE ORAL at 09:13

## 2020-09-02 RX ADMIN — ESCITALOPRAM 20 MG: 10 TABLET, FILM COATED ORAL at 06:41

## 2020-09-02 RX ADMIN — LOSARTAN POTASSIUM 50 MG: 50 TABLET, FILM COATED ORAL at 09:13

## 2020-09-02 RX ADMIN — GABAPENTIN 400 MG: 400 CAPSULE ORAL at 13:17

## 2020-09-02 RX ADMIN — GABAPENTIN 400 MG: 400 CAPSULE ORAL at 09:13

## 2020-09-02 RX ADMIN — MESALAMINE 800 MG: 400 CAPSULE, DELAYED RELEASE ORAL at 13:17

## 2020-09-02 RX ADMIN — POTASSIUM CHLORIDE 20 MEQ: 1500 TABLET, EXTENDED RELEASE ORAL at 09:13

## 2020-09-02 RX ADMIN — DILTIAZEM HYDROCHLORIDE 120 MG: 120 CAPSULE, COATED, EXTENDED RELEASE ORAL at 09:13

## 2020-09-02 RX ADMIN — GLIMEPIRIDE 2 MG: 2 TABLET ORAL at 09:13

## 2020-09-02 ASSESSMENT — PAIN SCALES - GENERAL: PAINLEVEL_OUTOF10: 0

## 2020-09-02 NOTE — PROGRESS NOTES
DISCHARGE SUMMARY    Group interaction skills/socialization:  Junito Bhandari displayed social interaction skills at the complete independence. Leisure participation/awareness: Junito Bhandari participated in 1 therapeutic recreation interventions identifying 3 benefits to leisure participation.     Other:     Outcomes: goals achieved      Electronically signed by Vahe Bowers on 9/2/2020 at 12:52 PM

## 2020-09-02 NOTE — CONSULTS
Chart reviewed. Case discussed with inpatient rehab admissions coordinator. Please see her note for details. She is a candidate for inpatient rehab admission.       Bharati Sharma MD

## 2020-09-02 NOTE — PROGRESS NOTES
Physical Therapy    Facility/Department: [address] REHAB  Discharge Summary   NAME: Jonathan Crane  : 1965  MRN: [id number]    Date of Service: 2020  Evaluating Therapist: Maksim Alejandre DPT    ROOM: [address] [place] [address]  DIAGNOSIS: Cervical Myelopathy   PMH: s/p C4-C7 ACDF on 20. PMH include anemia, anxiety, hx CVA, CKD, COPD, chron's disease, DM, HTN, HLD, obesity, raynaud's disease, LEONOR, tuberculosis. PRECAUTIONS: cervical precautions, C-collar    Patient lives alone alan mutli-level house with 3 steps to enter without HR. Pt reporting friends/neighbors will assist upon D/C. 7-8 steps with bilateral HR to second floor bed/bathroom. Patient ambulated with cane PTA.  States she recently has had multiple falls at home.        Initial Evaluation  20 Discharge  20 Short Term Goals Long Term Goals    Bed Mobility  Rolling: mod I   Supine to sit: mod I   Sit to supine: mod I   Scooting: mod I  Independent      Transfers Sit to stand: sup  Stand to sit: sup  Stand pivot: sup with spc Mod I with spc Sup with AAD Mod I with AAD   Ambulation    250 feet with spc with sup >1000' with spc mod I  >500 feet with AAD with sup >1000 feet with AAD with mod I   Walking 10 feet on uneven surface 10 feet with spc with sup >1000' with spc mod I  >500 feet with AAD with sup >1000 feet with AAD with mod I   Wheel Chair Mobility n/a n/a     Car Transfers sup Mod I   Mod I   Stair negotiation: ascended and descended  12 steps with one rail and spc with sba/sup (recip patterning)  16 steps with HR and spc mod I (recip patterning) 12 steps without rail with sup >12 steps without rail with mod I   Curb Step:   ascended and descended 7.5 inch step with AAD and sba/sup  9.5 in curb step with spc mod I  >9 inch step with AAD and sup >9 inch step with AAD and mod I   Picking up object off the floor Picked up 1# with mod I  Picked up 1# mod I      BLE ROM [facility]     BLE Strength 4+/5 bilateral LE 4+/5 bilateral LE     Balance Sitting: Independent standing: sup with spc  Campo 54/56 Low fall risk  Sitting: Independent   Standing: mod I with spc     CAMPO 56/56 low fall risk     Date Family Teach Completed       Is additional Family Teaching Needed? Y or N       Hindering Progress Higher level balance      PT recommended ELOS 2-3 days      Team's Discharge Plan       Therapist at Team Meeting         Pt made steady and consistent progress during POC towards all LTG's while ARU. Pt a low fall risk according to CAMPO score, and using an spc to maximize safety with mobility. Rec HHPT for continued balance training with progression to OPPT when cleared by NS. Pt to d/c home this date.      Man Pichardo, DPT  [id number]

## 2020-09-02 NOTE — PROGRESS NOTES
Physical Therapy    Facility/Department: [facility]  Treatment Note     NAME: Bernardo Lyles  : 1965  MRN: [id number]    Date of Service: 2020  Evaluating Therapist: Nuvia Brewer DPT    ROOM: [address] [place] [address]  DIAGNOSIS: Cervical Myelopathy   PMH: s/p C4-C7 ACDF on 20. PMH include anemia, anxiety, hx CVA, CKD, COPD, chron's disease, DM, HTN, HLD, obesity, raynaud's disease, LEONOR, tuberculosis. PRECAUTIONS: cervical precautions, C-collar    Patient lives alone alan mutli-level house with 3 steps to enter without HR. Pt reporting friends/neighbors will assist upon D/C. 7-8 steps with bilateral HR to second floor bed/bathroom. Patient ambulated with cane PTA. States she recently has had multiple falls at home.        Initial Evaluation  20 AM PM Short Term Goals Long Term Goals    Was pt agreeable to Eval/treatment? yes yes D/c     Does pt have pain?  7/10 shoulders/neck  7/10 shoulders/neck      Bed Mobility  Rolling: mod I   Supine to sit: mod I   Sit to supine: mod I   Scooting: mod I  Independent       Transfers Sit to stand: sup  Stand to sit: sup  Stand pivot: sup with spc Mod I with spc  Sup with AAD Mod I with AAD   Ambulation    250 feet with spc with sup >1000' with spc mod I   >500 feet with AAD with sup >1000 feet with AAD with mod I   Walking 10 feet on uneven surface 10 feet with spc with sup   >500 feet with AAD with sup >1000 feet with AAD with mod I   Wheel Chair Mobility n/a       Car Transfers sup    Mod I   Stair negotiation: ascended and descended  12 steps with one rail and spc with sba/sup (recip patterning)  16 steps with HR and spc mod I (recip patterning)  12 steps without rail with sup >12 steps without rail with mod I   Curb Step:   ascended and descended 7.5 inch step with AAD and sba/sup  9.5 in curb step x6 reps with spc mod I   >9 inch step with AAD and sup >9 inch step with AAD and mod I   Picking up object off the floor Picked up 1# with mod I        BLE ROM [facility] BLE Strength 4+/5 bilateral LE       Balance  Sitting: Independent standing: sup with spc  Campo 54/56 Low fall risk        Date Family Teach Completed        Is additional Family Teaching Needed? Y or N        Hindering Progress Higher level balance       PT recommended ELOS 2-3 days       Team's Discharge Plan        Therapist at Team Meeting          Therapeutic Exercise:   AM: 10x STS transfer at w/c, no cues for hand placement  dynadisc on top of blue mat pad static standing tandem stance bilateral LE x2 mins with arm lifting/perturbations. Increased sway/LOB with R LE stance leg  CAMPO/56 low fall risk  Braiding bilateral directions front/back 100' x2  Agility ladder: icky shuffle FW/BW x2 reps, in in out out FW/BW x2 reps  9.5 in curb step x6 reps with spc mod I       PM:    Patient education  Pt educated on safety with mobility, compensatory balance strategies    Patient response to education:   Pt verbalized understanding Pt demonstrated skill Pt requires further education in this area   yes yes reinforcement     Additional Comments: Reviewed all mobility to prepare for d/c home this date. Low fall risk with perfect score on CAMPO balance test. Improvement noted with curb step negotiation as per above. Pt to d/c home this date. AM  Time in: 0915  Time out: 1000    PM  Time in: dc  Time out: dc    Pt is making good progress toward established Physical Therapy goals. Continue with physical therapy current plan of care.     RERE GlassT  [id number]

## 2020-09-02 NOTE — PROGRESS NOTES
Occupational Therapy  OCCUPATIONAL THERAPY DAILY NOTE/ Discharge Summary    Date:2020  Patient Name: Jona Luu  MRN: [id number]  : 1965  Room: [address] [place] [address]     Patient Active Problem List   Diagnosis    L-S radiculopathy    Migraine without aura and without status migrainosus, not intractable    Essential hypertension    Type 2 diabetes mellitus with renal complication (HCC)    Stage 3 chronic kidney disease ([facility]    HLD (hyperlipidemia)    Closed displaced fracture of head of left radius    Hx-TIA (transient ischemic attack)    Cervical spondylosis    Frequent falls    Cervical herniated disc    Cervical myelopathy (HCC)     Diagnosis: Cervical Myelopathy    Precautions: Fall Risk, cervical precautions, cervical collar, dental soft & carb controlled diet    Functional Assessment:   Date Status AE  Comments   Feeding 20 Mod I     Grooming 20 Mod I     Bathing 20 S/SBA-  UB/LB skills however   Min A- washing hair due to cervical precautions. Shower  LH hose     UB Dressing 20 Mod I     LB Dressing 20  Mod I     Homemaking 20 S       Functional Transfers / Balance:   Date Status DME  Comments   Sit Balance 20 Mod I/Sup     Stand Balance 20 Independent No device     ? Tub  X Shower   Transfer 20 Sup Shower stall and grab bar     Commode   Transfer 20  Mod I       Functional   Mobility 20 Mod I hurricane    Other:    Sit <> stand:      Supine <> sit:     20     Mod I      SBA           Bed rails      Functional Exercises / Activity:  Mod resistive dewayne bar X 25 reps on 4 planes to increase BUE forearm, wrist and  strength for independence with functional tasks. Leisure task with focus on BUE strength and endurance.  Pt completed paper pencil task with 1# wrist weights in AROM and demonstrates good tolerance.   -scapular protraction/retraction and elbow flexion 3 X 10       Sensory / Neuromuscular Re-Education:      Cognitive Skills:   Status Comments   Problem   Solving Good    Memory good    Sequencing Good    Safety Good      Visual Perception:    Education:  Pt educated on energy conservation. ? Family teach completed on:    Pain Level: No c/o pain    Additional Notes:       Patient has made good progress during treatment sessions toward set goals. Therapy emphasis to obtain goals: To increase independence with ADLs/ IADLs, transfers, fucntional mobility and improve activity tolerance. Continue with current OT Plan of care. Roseann Hill for Discharge    Goals  Long term goals  Time Frame for Long term goals : 5 days to address above probem areas  Long term goal 1: Pt demo independent to eat all meals  Long term goal 2: Pt demo Mod I to stand @ sink for grooming & hygiene  Long term goal 3: Pt demo Sup bathing @ sink levle or shower level with AE as needed  Long term goal 4: Pt demo Mod I UE/LE dress  Long term goal 5: Pt demo Mod I commode trf with appropriate DME to esnure pt safety  Long term goals 6: Pt demo SUp walk in shower trf with approrpaite DME to esnure pt safety  Long term goal 7: Pt demo Mod I light homemaking activity  Long term goal 8: Pt demo G endurance for a 30 minute functional activity  Long term goal 9: Pt will state & adhere to precautions during ADL & functioanlly based tasks  Patient Goals   Patient goals : \"To be able to do my stairs, maneuver around the house. \"       DISCHARGE RECOMMENDATIONS  OCCUPATIONAL THERAPY DISCHARGE SUMMARY    Discontinue Occupational Therapy intervention. Pt has:   [x] met all set goals. [] made good progress toward set goals. [] has made slow progress toward goals and would benefit from rehab setting change.  [] had a medical decline and therefore was transferred off the Rehab unit.     Long term goals  Time Frame for Long term goals : 5 days to address above probem areas  Long term goal 1: Pt demo independent to eat all meals  Long term goal 2: Pt demo Mod I to stand @ sink for grooming & hygiene  Long term goal 3: Pt demo Sup bathing @ sink levle or shower level with AE as needed  Long term goal 4: Pt demo Mod I UE/LE dress  Long term goal 5: Pt demo Mod I commode trf with appropriate DME to esnure pt safety  Long term goals 6: Pt demo SUp walk in shower trf with approrpaite DME to esnure pt safety  Long term goal 7: Pt demo Mod I light homemaking activity  Long term goal 8: Pt demo G endurance for a 30 minute functional activity  Long term goal 9: Pt will state & adhere to precautions during ADL & functioanlly based tasks    The Patient has received education on:  [x]Transfer Safety [x]Compensatory tech for ADLs [x]AE training [x]DME training [x]Energy Conservation [x]Home / Kitchen Safety  [x]Fall Prevention  []Other:    Family training was completed: no    Recommendations: no OT recommended. Recommended DME:none    Post Discharge Care:   ? Home Independently  ? Home with 24hr Care / Supervision ? [facility] with Partial Supervision ? Home with Home Health OT ? Home with Out Pt OT ? Other: ___   Comments:         Time in Time out Tx Time Breakdown  Variance:   First Session  10:55 11:30 x Individual Tx- 35 min   Concurrent Tx -    ? Co-Tx -   ? Group Tx -   ? Time Missed -     Second Session   Individual Tx-   ? Concurrent Tx -  ? Co-Tx -   ? Group Tx -   ? Time Missed -     Third Session    ? Individual Tx-   ? Concurrent Tx -  ? Co-Tx -   ? Group Tx -   ?  Time Missed -         Total Tx Time: 35 mins      Thomas Gonzalez CALVIN/L [address], [address] [facility], OTR/L [id number]

## 2020-09-02 NOTE — PROGRESS NOTES
Progress Note    Subjective/   60y.o. year old female on the rehab unit for cervical myelopathy. Jaziel Lacey for discharge. Seen with OT in the shower performing ADL. No SOB or chest pain. Bowel and bladder OK. Objective/   VITALS:  BP (!) 104/56   Pulse 59   Temp 97.7 °F (36.5 °C) (Temporal)   Resp 16   Ht 5' 3\" (1.6 m)   Wt 189 lb (85.7 kg)   SpO2 96%   BMI 33.48 kg/m²   24HR INTAKE/OUTPUT:      Intake/Output Summary (Last 24 hours) at 9/1/2020 3400  Last data filed at 9/1/2020 1700  Gross per 24 hour   Intake 600 ml   Output --   Net 600 ml     Constitutional:  Alert, awake, no apparent distress   Cardiovascular:  S1, S2 without m/r/g   Respiratory:  CTA B without w/r/r   Abdomen: NA  Ext: no pitting LE edema  Neuro: good sitting balance    Functional Level      Date   Status   AE    Comments     Feeding   8/29/20   S/U             Grooming   9/1/20   Mod I/Sup       Pt completed grooming tasks with good follow thru with cervical precautions. Bathing   9/1/20   S/SBA-  UB/LB skills however     Min A- washing hair due to cervical precautions. Shower    LH hose  Pt completed UB bathing following cervical precautions. LB bathing using BLE crossover method seated on shower stall bench. Pt required assist to wash long hair due to neck precautions. UB Dressing   9/1/20   Mod I/Sup       To james bra, beltran and button down blouse      LB Dressing   9/1/20    Mod I/Sup       Pt able to james underwear, pants and shoes while seated in arm chair. Pt used BLE crossover method with socks and shoes. Homemaking   8/31/20   S                Functional Transfers / Balance:      Date Status DME  Comments   Sit Balance 9/1/20 Mod I/Sup   Sitting balance in bedside chair, arm chair and shower stall bench. Stand Balance 9/1/20 Independent No device  Standing balance during fxl mobility, transfers and clothing mgmt.     ? Tub  X Shower   Transfer 9/1/20 Sup Shower stall and grab bar  Pt entered/exited shower stall using grab bar for safety. Commode   Transfer 8/31/20  Mod I         Functional   Mobility 9/1/20 Mod I hurricane Pt used hurricane when ambulating in room and up/down hallway. Other:     Sit <> stand:        Supine <> sit:       9/1/20 8/30/20       Mod I        SBA                Bed rails       Sit to stand transfers on/off bedside recliner and arm chair             Functional Exercises / Activity:  Pt completed am ADL;s including shower while following cervical precautions needing assistance to wash hair for joint protection with patient demo good follow thru during dressing, bathing and grooming tasks.      2nd session:  Pt engaged in [address] [place] [address] ex's using beige putty/wheel on incline wedge to increase shld/scapula/elbow ROM along with strengthening for ADL;s, transfers and fxl mobility with good tolerance. Pt able to partially comb out hair  while seated to increase grooming skills with assist with back due to long length and neck precautions.    Sensory / Neuromuscular Re-Education:        Cognitive Skills:    Status Comments   Problem   Solving Good     Memory good     Sequencing Good     Safety Good            Scheduled Meds:   glimepiride  2 mg Oral Daily with breakfast    bisacodyl  5 mg Oral Daily    polyethylene glycol  17 g Oral Daily    sennosides-docusate sodium  1 tablet Oral BID    atorvastatin  40 mg Oral Daily    bumetanide  2 mg Oral BID    dilTIAZem  120 mg Oral Daily    escitalopram  20 mg Oral Daily    gabapentin  400 mg Oral TID    insulin lispro  0-12 Units Subcutaneous TID WC    insulin lispro  0-6 Units Subcutaneous Nightly    losartan  50 mg Oral Daily    mesalamine  800 mg Oral TID    potassium chloride  20 mEq Oral Daily     Continuous Infusions:   dextrose       PRN Meds:fleet, magnesium hydroxide, oxyCODONE **OR** oxyCODONE, promethazine **OR** [DISCONTINUED] ondansetron, sodium chloride flush, dextrose, dextrose, glucagon (rDNA),

## 2020-09-02 NOTE — PLAN OF CARE
Problem: Falls - Risk of:  Goal: Will remain free from falls  Description: Will remain free from falls  Outcome: Met This Shift  Goal: Absence of physical injury  Description: Absence of physical injury  Outcome: Met This Shift     Problem: Pain:  Goal: Pain level will decrease  Description: Pain level will decrease  Outcome: Met This Shift  Goal: Control of acute pain  Description: Control of acute pain  Outcome: Met This Shift  Goal: Control of chronic pain  Description: Control of chronic pain  Outcome: Met This Shift     Problem: Neurological  Goal: Maximum potential motor/sensory/cognitive function  Outcome: Met This Shift

## 2020-09-02 NOTE — PROGRESS NOTES
Discharge instructions reviewed with patient. Patient verbalized understanding and did not have any questions at this time.

## 2020-09-03 NOTE — DISCHARGE SUMMARY
Rehabilitation Discharge Summary     Patient Identification  Macey Sahu is a 60 y.o. female.   :  1965  Admit Date:  2020  Discharge date and time: 2020  1:40 PM   Attending Provider: Felicity att. providers found                                     Admission Diagnoses:   Cervical myelopathy ([facility] [G95.9]    Discharge Diagnoses:   Patient Active Problem List   Diagnosis    L-S radiculopathy    Migraine without aura and without status migrainosus, not intractable    Essential hypertension    Type 2 diabetes mellitus with renal complication (HCC)    Stage 3 chronic kidney disease ([facility]    HLD (hyperlipidemia)    Closed displaced fracture of head of left radius    Hx-TIA (transient ischemic attack)    Cervical spondylosis    Frequent falls    Cervical herniated disc    Cervical myelopathy [facility])        Discharge Physician: Gretchen Dorsey    Admission Functional Status:    FIM / EVAL Discipline Initial: 20 Follow Up: 20 Current:    Eating OT Stand by Assist    Stand by Assist    Stand by Assist      Grooming OT Moderate Assist    Moderate Assist    Stand by Assist      Bathing OT Moderate Assist    Moderate Assist    Stand by Assist      Dressing Upper Extremity OT Minimum assistance    Minimum assistance    Stand by Assist      Dressing Lower Extremity OT Max Assist    Minimum assistance    Stand by Assist      Toileting OT nt Minimum assistance    DNT   Toilet Transfers OT nt Minimum assistance    Stand by Assist      Tub/Shower Transfers OT nt nt nt   Homemaking OT nt nt nt   [employer] Mobility PT nt Stand by Assist    Stand by Assist      Bed/Wheelchair Transfers PT Minimum assistance    Minimum assistance    Stand by Assist      Locomotion Walk / Wheelchair  Device:  Distance: PT 25 ft with FWW Minimum assistance    25 ft x 2 with fww Minimum assistance    150 ft with FWW. 150 ft with CGA             Discharge Functional Status:        Date   Status   AE    Comments     Feeding   20   Mod I             Grooming   9/1/20   Mod I             Bathing   9/1/20   S/SBA-  UB/LB skills however     Min A- washing hair due to cervical precautions. Shower    LH hose        UB Dressing   9/1/20   Mod I             LB Dressing   9/1/20    Mod I             Homemaking   8/31/20   S                Functional Transfers / Balance:      Date Status DME  Comments   Sit Balance 9/1/20 Mod I/Sup       Stand Balance 9/2/20 Independent No device      ? Tub  X Shower   Transfer 9/1/20 Sup Shower stall and grab bar      Commode   Transfer 8/31/20  Mod I         Functional   Mobility 9/2/20 Mod I hurricane     Other:     Sit <> stand:        Supine <> sit:       9/1/20 8/30/20       Mod I        SBA                Bed rails        Functional Exercises / Activity:  Mod resistive dewayne bar X 25 reps on 4 planes to increase BUE forearm, wrist and  strength for independence with functional tasks.      Leisure task with focus on BUE strength and endurance.  Pt completed paper pencil task with 1# wrist weights in AROM and demonstrates good tolerance.   -scapular protraction/retraction and elbow flexion 3 X 10         Sensory / Neuromuscular Re-Education:        Cognitive Skills:    Status Comments   Problem   Solving Good     Memory good     Sequencing Good     Safety Good          Initial Evaluation  8/29/20 Discharge  9/2/20 Short Term Goals Long Term Goals    Bed Mobility  Rolling: mod I   Supine to sit: mod I   Sit to supine: mod I   Scooting: mod I  Independent        Transfers Sit to stand: sup  Stand to sit: sup  Stand pivot: sup with spc Mod I with spc Sup with AAD Mod I with AAD   Ambulation    250 feet with spc with sup >1000' with spc mod I  >500 feet with AAD with sup >1000 feet with AAD with mod I   Walking 10 feet on uneven surface 10 feet with spc with sup >1000' with spc mod I  >500 feet with AAD with sup >1000 feet with AAD with mod I   Wheel Chair Mobility n/a n/a       Car Transfers sup Mod I    Mod I Glucose Latest Ref Range: 74 - 99 mg/dL 131 (H)    Calcium Latest Ref Range: 8.6 - 10.2 mg/dL 8.9    WBC Latest Ref Range: 4.5 - 11.5 E9/L  10.9   RBC Latest Ref Range: 3.50 - 5.50 E12/L  4.75   Hemoglobin Quant Latest Ref Range: 11.5 - 15.5 g/dL  13.7   Hematocrit Latest Ref Range: 34.0 - 48.0 %  42.9   MCV Latest Ref Range: 80.0 - 99.9 fL  90.3   MCH Latest Ref Range: 26.0 - 35.0 pg  28.8   MCHC Latest Ref Range: 32.0 - 34.5 %  31.9 (L)   MPV Latest Ref Range: 7.0 - 12.0 fL  10.5   RDW Latest Ref Range: 11.5 - 15.0 fL  13.5   Platelet Count Latest Ref Range: 130 - 450 E9/L  243   Neutrophils % Latest Ref Range: 43.0 - 80.0 %  62.9   Immature Granulocytes % Latest Ref Range: 0.0 - 5.0 %  2.1   Lymphocyte % Latest Ref Range: 20.0 - 42.0 %  22.7   Monocytes % Latest Ref Range: 2.0 - 12.0 %  8.1   Eosinophils % Latest Ref Range: 0.0 - 6.0 %  3.9   Basophils % Latest Ref Range: 0.0 - 2.0 %  0.3   Neutrophils Absolute Latest Ref Range: 1.80 - 7.30 E9/L  6.86   Immature Granulocytes # Latest Units: E9/L  0.23   Lymphocytes Absolute Latest Ref Range: 1.50 - 4.00 E9/L  2.47   Monocytes Absolute Latest Ref Range: 0.10 - 0.95 E9/L  0.88   Eosinophils Absolute Latest Ref Range: 0.05 - 0.50 E9/L  0.43   Basophils Absolute Latest Ref Range: 0.00 - 0.20 E9/L  0.03       Treatments: therapies: PT, OT, RN and SW    Discharge Exam:  HEENT: NC/AT  Neck: Supple  Lungs: clear  Heart: regular  Abd: +BS  Ext: No pitting edema, no calf tenderness  Neuro: good sitting balance    Disposition: Home alone to [facility] level     Condition: Improved    Patient Instructions:    Taylor Beckham \"Rosi\"   Home Medication Instructions WGQ:[id number]    Printed on:11/08/20 2207   Medication Information                      ASACOL  MG TBEC TBEC tablet    800 mg 3 times daily              atorvastatin (LIPITOR) 40 MG tablet  Take 40 mg by mouth daily.              B Complex Vitamins (VITAMIN B COMPLEX) TABS  Take 1 tablet by mouth daily benzocaine-menthol (CEPACOL SORE THROAT) 15-3.6 MG lozenge  Take 1 lozenge by mouth every 2 hours as needed for Sore Throat             bisacodyl (DULCOLAX) 5 MG EC tablet  Take 1 tablet by mouth daily as needed for Constipation             bumetanide (BUMEX) 1 MG tablet  TAKE 2 TABLETS TWICE A DAY             butalbital-acetaminophen-caffeine (FIORICET, ESGIC) -40 MG per tablet  Take 1 tablet by mouth every 4 hours as needed for Headaches             Cholecalciferol (VITAMIN D PO)  Take 1,000 Units by mouth daily              diltiazem (CARDIZEM CD) 120 MG ER capsule  TAKE 1 CAPSULE DAILY             Erenumab-aooe (AIMOVIG) 140 MG/ML SOAJ  Inject 140 mg into the skin every 30 days             escitalopram (LEXAPRO) 20 MG tablet  Take 1 tablet by mouth Daily             gabapentin (NEURONTIN) 400 MG capsule  Take 1 capsule by mouth 3 times daily for 90 days. glimepiride (AMARYL) 2 MG tablet  Take 1 tablet by mouth daily (with breakfast)             losartan (COZAAR) 50 MG tablet  Take 1 tablet by mouth daily             Multiple Vitamins-Minerals (WOMENS ONE DAILY PO)  Take 1 tablet by mouth daily              potassium chloride SA (K-DUR;KLOR-CON M) 20 MEQ tablet  Take 1 tablet by mouth daily. rizatriptan (MAXALT) 10 MG tablet  Take 1 tablet by mouth daily as needed for Migraine May repeat dose in 2 hours if first dose ineffective; no more than 2 doses in 24 hours             sennosides-docusate sodium (SENOKOT-S) 8.6-50 MG tablet  Take 1 tablet by mouth 2 times daily Stop taking if having loose stool             Sodium Phosphates (FLEET) 7-19 GM/118ML  Place 1 enema rectally daily as needed (constipation unrelieved by oral medicine)                     Activity: activity as tolerated and maintain cervical collar until cleared per neurosurgery.   Diet: diabetic diet  Wound Care: keep wound clean and dry  DME Orders after Discharge: patient has cane, tub seat    Follow-up:  [facility] [address]  [address], [facility] [place]  In 2 weeks    [address] [facility], [address] [place] [address]  [phone]

## 2020-09-08 ENCOUNTER — HOSPITAL ENCOUNTER (OUTPATIENT)
Age: 55
Discharge: HOME | End: 2020-09-10
Payer: MEDICARE

## 2020-09-08 LAB
ALBUMIN SERPL-MCNC: 3.7 G/DL (ref 3.5–5.2)
ALP BLD-CCNC: 84 U/L (ref 35–104)
ALT SERPL-CCNC: 14 U/L (ref 0–32)
ANION GAP SERPL CALCULATED.3IONS-SCNC: 15 MMOL/L (ref 7–16)
AST SERPL-CCNC: 19 U/L (ref 0–31)
BASOPHILS ABSOLUTE: 0.09 E9/L (ref 0–0.2)
BASOPHILS RELATIVE PERCENT: 0.8 % (ref 0–2)
BILIRUB SERPL-MCNC: 0.5 MG/DL (ref 0–1.2)
BUN BLDV-MCNC: 14 MG/DL (ref 6–20)
CALCIUM SERPL-MCNC: 9.7 MG/DL (ref 8.6–10.2)
CHLORIDE BLD-SCNC: 100 MMOL/L (ref 98–107)
CO2: 25 MMOL/L (ref 22–29)
CREAT SERPL-MCNC: 1.1 MG/DL (ref 0.5–1)
EOSINOPHILS ABSOLUTE: 0.25 E9/L (ref 0.05–0.5)
EOSINOPHILS RELATIVE PERCENT: 2.4 % (ref 0–6)
GFR AFRICAN AMERICAN: >60
GFR NON-AFRICAN AMERICAN: 51 ML/MIN/1.73
GLUCOSE BLD-MCNC: 164 MG/DL (ref 74–99)
HCT VFR BLD CALC: 43.9 % (ref 34–48)
HEMOGLOBIN: 13.5 G/DL (ref 11.5–15.5)
IMMATURE GRANULOCYTES #: 0.04 E9/L
IMMATURE GRANULOCYTES %: 0.4 % (ref 0–5)
LYMPHOCYTES ABSOLUTE: 1.81 E9/L (ref 1.5–4)
LYMPHOCYTES RELATIVE PERCENT: 17.1 % (ref 20–42)
MCH RBC QN AUTO: 29 PG (ref 26–35)
MCHC RBC AUTO-ENTMCNC: 30.8 % (ref 32–34.5)
MCV RBC AUTO: 94.2 FL (ref 80–99.9)
MONOCYTES ABSOLUTE: 0.87 E9/L (ref 0.1–0.95)
MONOCYTES RELATIVE PERCENT: 8.2 % (ref 2–12)
NEUTROPHILS ABSOLUTE: 7.54 E9/L (ref 1.8–7.3)
NEUTROPHILS RELATIVE PERCENT: 71.1 % (ref 43–80)
PDW BLD-RTO: 13.3 FL (ref 11.5–15)
PLATELET # BLD: 254 E9/L (ref 130–450)
PMV BLD AUTO: 10.9 FL (ref 7–12)
POTASSIUM SERPL-SCNC: 4.4 MMOL/L (ref 3.5–5)
RBC # BLD: 4.66 E12/L (ref 3.5–5.5)
SODIUM BLD-SCNC: 140 MMOL/L (ref 132–146)
TOTAL PROTEIN: 6.8 G/DL (ref 6.4–8.3)
WBC # BLD: 10.6 E9/L (ref 4.5–11.5)

## 2020-09-08 PROCEDURE — 36415 COLL VENOUS BLD VENIPUNCTURE: CPT

## 2020-09-08 PROCEDURE — 85025 COMPLETE CBC W/AUTO DIFF WBC: CPT

## 2020-09-08 PROCEDURE — 80053 COMPREHEN METABOLIC PANEL: CPT

## 2020-09-14 ENCOUNTER — HOSPITAL ENCOUNTER (OUTPATIENT)
Age: 55
Discharge: HOME | End: 2020-09-16
Payer: MEDICARE

## 2020-09-14 LAB
ALBUMIN SERPL-MCNC: 4 G/DL (ref 3.5–5.2)
ALP BLD-CCNC: 101 U/L (ref 35–104)
ALT SERPL-CCNC: 15 U/L (ref 0–32)
ANION GAP SERPL CALCULATED.3IONS-SCNC: 19 MMOL/L (ref 7–16)
AST SERPL-CCNC: 16 U/L (ref 0–31)
BASOPHILS ABSOLUTE: 0.1 E9/L (ref 0–0.2)
BASOPHILS RELATIVE PERCENT: 1.2 % (ref 0–2)
BILIRUB SERPL-MCNC: 0.5 MG/DL (ref 0–1.2)
BUN BLDV-MCNC: 15 MG/DL (ref 6–20)
CALCIUM SERPL-MCNC: 10 MG/DL (ref 8.6–10.2)
CHLORIDE BLD-SCNC: 97 MMOL/L (ref 98–107)
CO2: 25 MMOL/L (ref 22–29)
CREAT SERPL-MCNC: 1.1 MG/DL (ref 0.5–1)
EOSINOPHILS ABSOLUTE: 0.33 E9/L (ref 0.05–0.5)
EOSINOPHILS RELATIVE PERCENT: 3.9 % (ref 0–6)
GFR AFRICAN AMERICAN: >60
GFR NON-AFRICAN AMERICAN: 51 ML/MIN/1.73
GLUCOSE BLD-MCNC: 204 MG/DL (ref 74–99)
HCT VFR BLD CALC: 43.1 % (ref 34–48)
HEMOGLOBIN: 13.3 G/DL (ref 11.5–15.5)
IMMATURE GRANULOCYTES #: 0.02 E9/L
IMMATURE GRANULOCYTES %: 0.2 % (ref 0–5)
LYMPHOCYTES ABSOLUTE: 1.71 E9/L (ref 1.5–4)
LYMPHOCYTES RELATIVE PERCENT: 20.3 % (ref 20–42)
MCH RBC QN AUTO: 28.7 PG (ref 26–35)
MCHC RBC AUTO-ENTMCNC: 30.9 % (ref 32–34.5)
MCV RBC AUTO: 92.9 FL (ref 80–99.9)
MONOCYTES ABSOLUTE: 0.67 E9/L (ref 0.1–0.95)
MONOCYTES RELATIVE PERCENT: 8 % (ref 2–12)
NEUTROPHILS ABSOLUTE: 5.58 E9/L (ref 1.8–7.3)
NEUTROPHILS RELATIVE PERCENT: 66.4 % (ref 43–80)
PDW BLD-RTO: 12.9 FL (ref 11.5–15)
PLATELET # BLD: 254 E9/L (ref 130–450)
PMV BLD AUTO: 10.5 FL (ref 7–12)
POTASSIUM SERPL-SCNC: 4.1 MMOL/L (ref 3.5–5)
RBC # BLD: 4.64 E12/L (ref 3.5–5.5)
SODIUM BLD-SCNC: 141 MMOL/L (ref 132–146)
TOTAL PROTEIN: 6.9 G/DL (ref 6.4–8.3)
WBC # BLD: 8.4 E9/L (ref 4.5–11.5)

## 2020-09-14 PROCEDURE — 80053 COMPREHEN METABOLIC PANEL: CPT

## 2020-09-14 PROCEDURE — 85025 COMPLETE CBC W/AUTO DIFF WBC: CPT

## 2020-09-14 PROCEDURE — 36415 COLL VENOUS BLD VENIPUNCTURE: CPT

## 2020-09-15 ENCOUNTER — HOSPITAL ENCOUNTER (OUTPATIENT)
Dept: GENERAL RADIOLOGY | Age: 55
Discharge: HOME | End: 2020-09-17
Payer: COMMERCIAL

## 2020-09-15 ENCOUNTER — HOSPITAL ENCOUNTER (OUTPATIENT)
Age: 55
Discharge: HOME | End: 2020-09-17
Payer: COMMERCIAL

## 2020-09-15 PROCEDURE — 72040 X-RAY EXAM NECK SPINE 2-3 VW: CPT

## 2020-09-22 ENCOUNTER — HOSPITAL ENCOUNTER (OUTPATIENT)
Age: 55
Discharge: HOME | End: 2020-09-24
Payer: COMMERCIAL

## 2020-09-22 ENCOUNTER — OFFICE VISIT (OUTPATIENT)
Dept: NEUROSURGERY | Age: 55
End: 2020-09-22

## 2020-09-22 ENCOUNTER — TELEPHONE (OUTPATIENT)
Dept: NEUROSURGERY | Age: 55
End: 2020-09-22

## 2020-09-22 VITALS
WEIGHT: 185 LBS | BODY MASS INDEX: 32.78 KG/M2 | HEART RATE: 78 BPM | SYSTOLIC BLOOD PRESSURE: 125 MMHG | TEMPERATURE: 98 F | HEIGHT: 63 IN | DIASTOLIC BLOOD PRESSURE: 74 MMHG

## 2020-09-22 LAB
ALBUMIN SERPL-MCNC: 4 G/DL (ref 3.5–5.2)
ALP BLD-CCNC: 111 U/L (ref 35–104)
ALT SERPL-CCNC: 16 U/L (ref 0–32)
ANION GAP SERPL CALCULATED.3IONS-SCNC: 19 MMOL/L (ref 7–16)
AST SERPL-CCNC: 26 U/L (ref 0–31)
BASOPHILS ABSOLUTE: 0.06 E9/L (ref 0–0.2)
BASOPHILS RELATIVE PERCENT: 0.7 % (ref 0–2)
BILIRUB SERPL-MCNC: 0.5 MG/DL (ref 0–1.2)
BUN BLDV-MCNC: 12 MG/DL (ref 6–20)
CALCIUM SERPL-MCNC: 9.9 MG/DL (ref 8.6–10.2)
CHLORIDE BLD-SCNC: 102 MMOL/L (ref 98–107)
CO2: 24 MMOL/L (ref 22–29)
CREAT SERPL-MCNC: 1.1 MG/DL (ref 0.5–1)
EOSINOPHILS ABSOLUTE: 0.54 E9/L (ref 0.05–0.5)
EOSINOPHILS RELATIVE PERCENT: 6.3 % (ref 0–6)
GFR AFRICAN AMERICAN: >60
GFR NON-AFRICAN AMERICAN: 51 ML/MIN/1.73
GLUCOSE BLD-MCNC: 123 MG/DL (ref 74–99)
HCT VFR BLD CALC: 42.1 % (ref 34–48)
HEMOGLOBIN: 13.5 G/DL (ref 11.5–15.5)
IMMATURE GRANULOCYTES #: 0.03 E9/L
IMMATURE GRANULOCYTES %: 0.4 % (ref 0–5)
LYMPHOCYTES ABSOLUTE: 1.93 E9/L (ref 1.5–4)
LYMPHOCYTES RELATIVE PERCENT: 22.7 % (ref 20–42)
MCH RBC QN AUTO: 28.9 PG (ref 26–35)
MCHC RBC AUTO-ENTMCNC: 32.1 % (ref 32–34.5)
MCV RBC AUTO: 90.1 FL (ref 80–99.9)
MONOCYTES ABSOLUTE: 0.75 E9/L (ref 0.1–0.95)
MONOCYTES RELATIVE PERCENT: 8.8 % (ref 2–12)
NEUTROPHILS ABSOLUTE: 5.2 E9/L (ref 1.8–7.3)
NEUTROPHILS RELATIVE PERCENT: 61.1 % (ref 43–80)
PDW BLD-RTO: 13.1 FL (ref 11.5–15)
PLATELET # BLD: 300 E9/L (ref 130–450)
PMV BLD AUTO: 10.8 FL (ref 7–12)
POTASSIUM SERPL-SCNC: 4.5 MMOL/L (ref 3.5–5)
RBC # BLD: 4.67 E12/L (ref 3.5–5.5)
SODIUM BLD-SCNC: 145 MMOL/L (ref 132–146)
TOTAL PROTEIN: 7.1 G/DL (ref 6.4–8.3)
WBC # BLD: 8.5 E9/L (ref 4.5–11.5)

## 2020-09-22 PROCEDURE — 36415 COLL VENOUS BLD VENIPUNCTURE: CPT

## 2020-09-22 PROCEDURE — 80053 COMPREHEN METABOLIC PANEL: CPT

## 2020-09-22 PROCEDURE — 99024 POSTOP FOLLOW-UP VISIT: CPT | Performed by: PHYSICIAN ASSISTANT

## 2020-09-22 PROCEDURE — 85025 COMPLETE CBC W/AUTO DIFF WBC: CPT

## 2020-09-22 RX ORDER — OXYCODONE HYDROCHLORIDE 5 MG/1
5 TABLET ORAL EVERY 4 HOURS PRN
Qty: 42 TABLET | Refills: 0 | Status: SHIPPED | OUTPATIENT
Start: 2020-09-22 | End: 2020-09-29

## 2020-09-22 RX ORDER — OXYCODONE HYDROCHLORIDE 5 MG/1
5 TABLET ORAL EVERY 4 HOURS PRN
Qty: 42 TABLET | Refills: 0 | Status: SHIPPED
Start: 2020-09-22 | End: 2020-09-22 | Stop reason: SDUPTHER

## 2020-09-22 NOTE — PROGRESS NOTES
Post Operative Follow-up    Patient is status post: ACDF. Pre op arm pain gone. kaye op site pain well. Physical Exam  Alert and Oriented X 3  PERRLA, EOMI  DARNELL 5/5  Wound: C/D/I    A/P: patient is s/p C4-7 ACDF. x-rays stable. Continue with collar and restrictions.

## 2020-09-22 NOTE — TELEPHONE ENCOUNTER
Left message for patient - [facility] called and they do not have oxycodone in stock. They have inactivated the Rx and it will need to be sent to a different pharmacy. Need to know where patient wants the new Rx sent to.

## 2020-11-16 ENCOUNTER — HOSPITAL ENCOUNTER (OUTPATIENT)
Dept: GENERAL RADIOLOGY | Age: 55
Discharge: HOME | End: 2020-11-18
Payer: COMMERCIAL

## 2020-11-16 ENCOUNTER — HOSPITAL ENCOUNTER (OUTPATIENT)
Age: 55
Discharge: HOME | End: 2020-11-18
Payer: COMMERCIAL

## 2020-11-16 PROCEDURE — 72040 X-RAY EXAM NECK SPINE 2-3 VW: CPT

## 2020-11-16 RX ORDER — ERENUMAB-AOOE 140 MG/ML
140 INJECTION, SOLUTION SUBCUTANEOUS
Qty: 3 PEN | Refills: 1 | Status: SHIPPED
Start: 2020-11-16 | End: 2021-06-04 | Stop reason: SDUPTHER

## 2020-11-18 ENCOUNTER — OFFICE VISIT (OUTPATIENT)
Dept: NEUROSURGERY | Age: 55
End: 2020-11-18

## 2020-11-18 VITALS — WEIGHT: 185 LBS | HEIGHT: 63 IN | TEMPERATURE: 97.3 F | BODY MASS INDEX: 32.78 KG/M2

## 2020-11-18 PROCEDURE — 99024 POSTOP FOLLOW-UP VISIT: CPT | Performed by: PHYSICIAN ASSISTANT

## 2020-11-18 NOTE — PROGRESS NOTES
Post Operative Follow-up     This is a 60year old female who presents to the office for a three month follow-up s/p C4-C7 ACDF     Subjective: Patient states she has been doing well. States her head feels heavy when she takes her collar off. Denies new pain, weakness, numbness, or tingling. Cervical x-rays reviewed.      Physical Exam:              WDWN, no apparent distress              Non-labored breathing               Vitals Stable              Alert and oriented x3              CN 3-12 intact              PERRL              EOMI              DARNELL well              Motor strength symmetric              Sensation to LT intact bilaterally   Incision healed well with no signs of infection                 Imagin20 cervical x-rays:   Impression    Postsurgical changes related to spinal fusion with anatomic alignment and no    unexpected findings.                Assessment: This is a 60 y.o.  female presenting for a three month follow-up s/p C4-C7 ACDF. Stable.      Plan:  -Okay to d/c collar. No restrictions. Referral made for physical therapy  -OARRS report reviewed  -Follow-up in neurosurgery clinic in 9 months for 1 year follow up with repeat cervical x-rays  -Call or return to neurosurgery office sooner if symptoms worsen or if new issues arise in the interim.     Electronically signed by Garth Aase, PA-C on 2020 at 2:30 PM

## 2020-12-21 ENCOUNTER — HOSPITAL ENCOUNTER (EMERGENCY)
Age: 55
Discharge: HOME | End: 2020-12-21
Attending: EMERGENCY MEDICINE
Payer: COMMERCIAL

## 2020-12-21 ENCOUNTER — APPOINTMENT (OUTPATIENT)
Dept: CT IMAGING | Age: 55
End: 2020-12-21
Payer: COMMERCIAL

## 2020-12-21 VITALS
TEMPERATURE: 98.5 F | WEIGHT: 186 LBS | HEART RATE: 79 BPM | SYSTOLIC BLOOD PRESSURE: 125 MMHG | DIASTOLIC BLOOD PRESSURE: 72 MMHG | BODY MASS INDEX: 32.96 KG/M2 | RESPIRATION RATE: 20 BRPM | OXYGEN SATURATION: 98 % | HEIGHT: 63 IN

## 2020-12-21 PROCEDURE — 6370000000 HC RX 637 (ALT 250 FOR IP): Performed by: EMERGENCY MEDICINE

## 2020-12-21 PROCEDURE — 72131 CT LUMBAR SPINE W/O DYE: CPT

## 2020-12-21 PROCEDURE — 73080 X-RAY EXAM OF ELBOW: CPT

## 2020-12-21 PROCEDURE — 72125 CT NECK SPINE W/O DYE: CPT

## 2020-12-21 PROCEDURE — 99283 EMERGENCY DEPT VISIT LOW MDM: CPT

## 2020-12-21 RX ORDER — OXYCODONE HYDROCHLORIDE AND ACETAMINOPHEN 5; 325 MG/1; MG/1
1 TABLET ORAL EVERY 6 HOURS PRN
Qty: 6 TABLET | Refills: 0 | Status: SHIPPED | OUTPATIENT
Start: 2020-12-21 | End: 2020-12-23

## 2020-12-21 RX ORDER — IBUPROFEN 800 MG/1
800 TABLET ORAL ONCE
Status: COMPLETED | OUTPATIENT
Start: 2020-12-21 | End: 2020-12-21

## 2020-12-21 RX ORDER — NAPROXEN 500 MG/1
500 TABLET ORAL 2 TIMES DAILY
Qty: 14 TABLET | Refills: 0 | Status: SHIPPED | OUTPATIENT
Start: 2020-12-21 | End: 2021-01-27

## 2020-12-21 RX ORDER — CHLORZOXAZONE 500 MG/1
500 TABLET ORAL 3 TIMES DAILY PRN
Qty: 21 TABLET | Refills: 0 | Status: SHIPPED | OUTPATIENT
Start: 2020-12-21 | End: 2020-12-28

## 2020-12-21 RX ADMIN — IBUPROFEN 800 MG: 800 TABLET, FILM COATED ORAL at 19:18

## 2020-12-21 ASSESSMENT — PAIN DESCRIPTION - PAIN TYPE: TYPE: CHRONIC PAIN;ACUTE PAIN

## 2020-12-21 ASSESSMENT — PAIN SCALES - GENERAL
PAINLEVEL_OUTOF10: 3
PAINLEVEL_OUTOF10: 8
PAINLEVEL_OUTOF10: 8

## 2020-12-21 ASSESSMENT — PAIN DESCRIPTION - ONSET: ONSET: GRADUAL

## 2020-12-21 ASSESSMENT — PAIN DESCRIPTION - ORIENTATION: ORIENTATION: LEFT;RIGHT

## 2020-12-21 ASSESSMENT — PAIN DESCRIPTION - FREQUENCY: FREQUENCY: CONTINUOUS

## 2020-12-21 ASSESSMENT — PAIN DESCRIPTION - LOCATION
LOCATION: NECK;SHOULDER;BACK;ARM
LOCATION: NECK

## 2020-12-21 ASSESSMENT — PAIN DESCRIPTION - DESCRIPTORS
DESCRIPTORS: ACHING;THROBBING;TIGHTNESS
DESCRIPTORS: ACHING;DISCOMFORT;SORE

## 2020-12-21 ASSESSMENT — PAIN DESCRIPTION - PROGRESSION: CLINICAL_PROGRESSION: NOT CHANGED

## 2020-12-22 ENCOUNTER — TELEPHONE (OUTPATIENT)
Dept: NEUROLOGY | Age: 55
End: 2020-12-22

## 2020-12-22 NOTE — TELEPHONE ENCOUNTER
JOEY for patient to call office for follow up w/Andrea Kriste Najjar, RAYSA - Gabapentin sent to pharmacy for a 1 month supply  Electronically signed by Paul Gilliam on 12/22/20 at 8:31 AM EST
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
## 2020-12-24 RX ORDER — RIZATRIPTAN BENZOATE 10 MG/1
10 TABLET ORAL DAILY PRN
Qty: 9 TABLET | Refills: 4 | Status: SHIPPED | OUTPATIENT
Start: 2020-12-24

## 2021-01-26 NOTE — PROGRESS NOTES
[address] [place] [address]. Gris Varma M.D., F.A.C.P. Michel Torres, JANE, APRN, [facility]. Remy Arriola, MSN, APRN-FNP-C  Derrell Parra MSN, APRN, FNP-C  Aquilino ORTEZ, PA-C  Lucero White MSN, APRN, FNP-C  [address] [facility] [address]  [place], [address] [address]  Phone: [phone]  Fax: [phone]           Humaira Villa is a 60 y.o. right handed woman    We are following her for migraines with aura and LS radiculopathy. She presents alone and remains an excellent historian. She underwent anterior cervical fusion in September 2020 with Dr. Samreen Denton, and is recovering very well. She has suffered no falls and has been using her cane faithfully. She has noticed a reduction in her overall headaches, and can tell the difference between a cervicogenic headache and her chronic migraine. She remains on Aimovig 140 mg with no GI or constipation issues. About one migraine per week. Maxalt 10 mg is effective for rescue when she has a headache. She also remains on gabapentin 400 mg 3 times daily for her low back pains, which are controlled. .   Failed amitriptyline in the past    She has lost 7 pounds on the keto diet, and is trying to reduce her A1c which is in the 7s. Her PCP placed her back on topiramate for weight loss purposes. She is considering moving to [place] with her grandchildren. No chest pain or palpitations  No SOB  No vertigo, lightheadedness or loss of consciousness  No itching or bruising appreciated  No speech or swallowing troubles    ROS otherwise negative    Current Outpatient Medications   Medication Sig Dispense Refill    rizatriptan (MAXALT) 10 MG tablet TAKE 1 TABLET BY MOUTH DAILY AS NEEDED FOR MIGRAINE MAY REPEAT DOSE IN 2 HOURS IF FIRST DOSE INEFFECTIVE NO MORE THAN 2 DOSES IN 24 HOURS 9 tablet 4    gabapentin (NEURONTIN) 400 MG capsule TAKE 1 CAPSULE BY MOUTH 3 TIMES DAILY FOR 90 DAYS.  270 capsule 0    naproxen (NAPROSYN) 500 MG tablet Take 1 tablet by mouth 2 times daily for 7 days 14 tablet 0    Erenumab-aooe (AIMOVIG) 140 MG/ML SOAJ Inject 140 mg into the skin every 30 days 3 pen 1    glimepiride (AMARYL) 2 MG tablet Take 1 tablet by mouth daily (with breakfast) 30 tablet 0    bisacodyl (DULCOLAX) 5 MG EC tablet Take 1 tablet by mouth daily as needed for Constipation 15 tablet 1    Sodium Phosphates (FLEET) 7-19 GM/118ML Place 1 enema rectally daily as needed (constipation unrelieved by oral medicine) 1 Bottle 0    sennosides-docusate sodium (SENOKOT-S) 8.6-50 MG tablet Take 1 tablet by mouth 2 times daily Stop taking if having loose stool 60 tablet 1    benzocaine-menthol (CEPACOL SORE THROAT) 15-3.6 MG lozenge Take 1 lozenge by mouth every 2 hours as needed for Sore Throat 168 lozenge 0    escitalopram (LEXAPRO) 20 MG tablet Take 1 tablet by mouth Daily  3    butalbital-acetaminophen-caffeine (FIORICET, ESGIC) -40 MG per tablet Take 1 tablet by mouth every 4 hours as needed for Headaches 20 tablet 3    losartan (COZAAR) 50 MG tablet Take 1 tablet by mouth daily  1    Multiple Vitamins-Minerals (WOMENS ONE DAILY PO) Take 1 tablet by mouth daily       diltiazem (CARDIZEM CD) 120 MG ER capsule TAKE 1 CAPSULE DAILY 90 capsule 3    ASACOL  MG TBEC TBEC tablet   800 mg 3 times daily       bumetanide (BUMEX) 1 MG tablet TAKE 2 TABLETS TWICE A  tablet 3    Cholecalciferol (VITAMIN D PO) Take 1,000 Units by mouth daily       B Complex Vitamins (VITAMIN B COMPLEX) TABS Take 1 tablet by mouth daily       potassium chloride SA (K-DUR;KLOR-CON M) 20 MEQ tablet Take 1 tablet by mouth daily. 30 tablet 3    atorvastatin (LIPITOR) 40 MG tablet Take 40 mg by mouth daily. No current facility-administered medications for this visit.       Objective:     /77 (Site: Left Upper Arm, Position: Sitting, Cuff Size: Medium Adult)   Pulse 98   Resp 12   Ht 5' 3\" (1.6 m)   Wt 184 lb (83.5 kg)   SpO2 99%   BMI 32.59 kg/m² General Appearance: alert, cooperative, in no acute distress and appears stated age  Head: normocephalic; atraumatic  Eyes: sclerae/conjunctivae clear   Neck: No carotid bruit; well-healed surgical scar anterior neck  Lungs: clear to auscultation bilaterally, respirations unlabored   Heart: regular rate and rhythm--no murmur  Extremities:  no cyanosis or edema  Pulses: 2+ and symmetric all extremities  Skin: color, texture and turgor normal, no rashes or lesions     Mental Status: alert; oriented x4--very pleasant as always    Appropriate attention/concentration  Intact memories and fundus of knowledge    Speech: no dysarthria  Language: no aphasias    Cranial Nerves:  I: smell    II: visual acuity     II: visual fields Full to confrontation   II: pupils PERRL   III,VII: ptosis None   III,IV,VI: extraocular muscles  Full ROM without nystagmus   V: mastication Normal   V: facial light touch sensation  Normal   V,VII: corneal reflex     VII: facial muscle function - upper  Normal   VII: facial muscle function - lower Normal   VIII: hearing Normal   IX: soft palate elevation  Normal   IX,X: gag reflex    XI: trapezius strength  5/5   XI: sternocleidomastoid strength 5/5   XI: neck extension strength  5/5   XII: tongue strength  Normal     Motor:  5/5 throughout   No drift  Increased tone in legs; overweight bulk  No abnormal movements    Sensory:  LT decreased right lower extremity  Vibration moderately impaired at both ankles and wrists    Coordination:   FTN FFM, intact bilaterally     Gait:  Narrow based, cautious without cane    DTR:   2+ arms  Right Quadriceps reflex 3+  Left Quadriceps reflex 3+  Right Achilles reflex 1+  Left Achilles reflex 1+     No Roper's    No pathological reflexes    Laboratory/Radiology:     Lab Results   Component Value Date     09/22/2020    K 4.5 09/22/2020     09/22/2020    CO2 24 09/22/2020    BUN 12 09/22/2020    CREATININE 1.1 (H) 09/22/2020    GLUCOSE 123 (H) 09/22/2020    CALCIUM 9.9 09/22/2020    PROT 7.1 09/22/2020    LABALBU 4.0 09/22/2020    BILITOT 0.5 09/22/2020    ALKPHOS 111 (H) 09/22/2020    AST 26 09/22/2020    ALT 16 09/22/2020    LABGLOM 51 09/22/2020    GFRAA >60 09/22/2020       Lab Results   Component Value Date    WBC 8.5 09/22/2020    HGB 13.5 09/22/2020    HCT 42.1 09/22/2020    MCV 90.1 09/22/2020     09/22/2020    LYMPHOPCT 22.7 09/22/2020    RBC 4.67 09/22/2020    MCH 28.9 09/22/2020    MCHC 32.1 09/22/2020    RDW 13.1 09/22/2020     Lab Results   Component Value Date    ALKPHOS 111 09/22/2020    ALT 16 09/22/2020    AST 26 09/22/2020    PROT 7.1 09/22/2020    BILITOT 0.5 09/22/2020    LABALBU 4.0 09/22/2020     MRI cervical spine Dec 2020: There is no acute compression fracture or subluxation of the cervical spine   2. Previous anterior fusion of C4 through 7 with placement of intervertebral   bone grafts. There is no hardware complication or surgical complication. MRI lumbar spine Dec 2020: No acute lumbar fracture. Prominent degenerative changes of the lumbar spine with multilevel vacuum disc formation and large anterior osteophytes. Multilevel broad-based disc bulging as described, most prominently at T11-T12, L3-L4, and L4-L5 with moderate central canal stenosis. All labs and images personally reviewed today    Assessment:     Chronic migraine headaches with aura: 4 HA days per month on max dose Aimovig. Triptan effective for rescue. Cervical spondylosis with myelopathy: s/p anterior fusion and stable.  Her exam is improving    Lumbosacral radiculopathy: controlled on gabapentin     Plan:     Continue Aimovig to 140 mg    Continue Maxalt 10 mg PRN    Fall precautions reviewed    RTO 6 months or sooner if issues     RAYSA Haile-CNP   12:21 PM  1/26/2021

## 2021-01-27 ENCOUNTER — OFFICE VISIT (OUTPATIENT)
Dept: NEUROLOGY | Age: 56
End: 2021-01-27
Payer: COMMERCIAL

## 2021-01-27 VITALS
OXYGEN SATURATION: 99 % | DIASTOLIC BLOOD PRESSURE: 77 MMHG | HEART RATE: 98 BPM | HEIGHT: 63 IN | SYSTOLIC BLOOD PRESSURE: 115 MMHG | WEIGHT: 184 LBS | BODY MASS INDEX: 32.6 KG/M2 | RESPIRATION RATE: 12 BRPM

## 2021-01-27 DIAGNOSIS — M54.17 L-S RADICULOPATHY: ICD-10-CM

## 2021-01-27 DIAGNOSIS — G43.109 MIGRAINE WITH AURA AND WITHOUT STATUS MIGRAINOSUS, NOT INTRACTABLE: Primary | ICD-10-CM

## 2021-01-27 DIAGNOSIS — M47.812 CERVICAL SPONDYLOSIS: ICD-10-CM

## 2021-01-27 PROBLEM — Z86.73 HX-TIA (TRANSIENT ISCHEMIC ATTACK): Status: RESOLVED | Noted: 2019-11-06 | Resolved: 2021-01-27

## 2021-01-27 PROBLEM — M50.20 CERVICAL HERNIATED DISC: Status: RESOLVED | Noted: 2020-08-24 | Resolved: 2021-01-27

## 2021-01-27 PROBLEM — R29.6 FREQUENT FALLS: Status: RESOLVED | Noted: 2020-07-21 | Resolved: 2021-01-27

## 2021-01-27 PROBLEM — S52.122A CLOSED DISPLACED FRACTURE OF HEAD OF LEFT RADIUS: Status: RESOLVED | Noted: 2019-08-21 | Resolved: 2021-01-27

## 2021-01-27 PROBLEM — G95.9 CERVICAL MYELOPATHY (HCC): Status: RESOLVED | Noted: 2020-08-28 | Resolved: 2021-01-27

## 2021-01-27 PROCEDURE — 99213 OFFICE O/P EST LOW 20 MIN: CPT | Performed by: NURSE PRACTITIONER

## 2021-01-27 RX ORDER — ALLOPURINOL 100 MG/1
1 TABLET ORAL DAILY
COMMUNITY
Start: 2020-12-22

## 2021-01-27 RX ORDER — PHENTERMINE HYDROCHLORIDE 30 MG/1
1 CAPSULE ORAL DAILY
COMMUNITY
Start: 2021-01-19

## 2021-01-27 RX ORDER — TOPIRAMATE 50 MG/1
1 TABLET, FILM COATED ORAL 2 TIMES DAILY
COMMUNITY
Start: 2021-01-15

## 2021-01-27 NOTE — PATIENT INSTRUCTIONS

## 2021-06-03 ENCOUNTER — TELEPHONE (OUTPATIENT)
Dept: NEUROLOGY | Age: 56
End: 2021-06-03

## 2021-06-03 NOTE — PROGRESS NOTES
[address] [place] [address]. Beatrice Allen M.D., F.A.C.P. Kit Rodriguez, JAEN, APRN, CNS  [facility] [address]. Lesia Cristina, MSN, APRN-FNP-C  Patricia Phillips MSN, APRN, FNP-C  Dang ORTEZ, PA-C  [address] MSN, APRN, FNP-C  [address], [address] [address]  Phone: [phone]  Fax: [phone]           Rod Montaño is a 60 y.o. right handed woman    We are seeing her for a sooner follow up for new numbness, gait issues, and speech abnormalities    She presents alone and remains an excellent historian. On Easter she developed intermittent bifacial numbness as well as tingling in her both hands and feet. Around that time, she also noticed an increase in word finding troubles and concentration. The symptoms continue to come and go but she thinks that her numbness worsens when it is cold. She also feels she is stumbling more and has run into a few walls. She is using her cane. PCP was concerned about new neurologic events and recommended she be seen. As stated before, she was restarted on topiramate in January for weight loss purposes and is on 50 mg twice daily. She has lost 30 pounds intentionally. She continues to note neck and low back pains, with some left arm radicular symptoms, but no other new limb weakness, facial drooping, slurred speech, vision changes, saddle paresthesias, or bowel bladder incontinence. Remains on gabapentin    Suffering from about one migraine headache per week on Aimovig 140 mg.  Headache responds to rizatriptan, with good tolerance. No other new neuro or physical complaints. Current Outpatient Medications   Medication Sig Dispense Refill    phentermine 30 MG capsule Take 1 capsule by mouth daily.       topiramate (TOPAMAX) 50 MG tablet Take 1 tablet by mouth 2 times daily      allopurinol (ZYLOPRIM) 100 MG tablet Take 1 tablet by mouth daily      rizatriptan (MAXALT) 10 MG tablet TAKE 1 TABLET BY MOUTH DAILY AS NEEDED FOR MIGRAINE MAY REPEAT DOSE IN 2 HOURS IF FIRST DOSE INEFFECTIVE NO MORE THAN 2 DOSES IN 24 HOURS 9 tablet 4    Erenumab-aooe (AIMOVIG) 140 MG/ML SOAJ Inject 140 mg into the skin every 30 days 3 pen 1    glimepiride (AMARYL) 2 MG tablet Take 1 tablet by mouth daily (with breakfast) 30 tablet 0    escitalopram (LEXAPRO) 20 MG tablet Take 1 tablet by mouth Daily  3    butalbital-acetaminophen-caffeine (FIORICET, ESGIC) -40 MG per tablet Take 1 tablet by mouth every 4 hours as needed for Headaches 20 tablet 3    losartan (COZAAR) 50 MG tablet Take 1 tablet by mouth daily  1    Multiple Vitamins-Minerals (WOMENS ONE DAILY PO) Take 1 tablet by mouth daily       diltiazem (CARDIZEM CD) 120 MG ER capsule TAKE 1 CAPSULE DAILY 90 capsule 3    ASACOL  MG TBEC TBEC tablet   800 mg 3 times daily       bumetanide (BUMEX) 1 MG tablet TAKE 2 TABLETS TWICE A  tablet 3    Cholecalciferol (VITAMIN D PO) Take 1,000 Units by mouth daily       B Complex Vitamins (VITAMIN B COMPLEX) TABS Take 1 tablet by mouth daily       potassium chloride SA (K-DUR;KLOR-CON M) 20 MEQ tablet Take 1 tablet by mouth daily. 30 tablet 3    atorvastatin (LIPITOR) 40 MG tablet Take 40 mg by mouth daily.  gabapentin (NEURONTIN) 400 MG capsule TAKE 1 CAPSULE BY MOUTH 3 TIMES DAILY FOR 90 DAYS. 270 capsule 0    bisacodyl (DULCOLAX) 5 MG EC tablet Take 1 tablet by mouth daily as needed for Constipation (Patient not taking: Reported on 6/4/2021) 15 tablet 1    benzocaine-menthol (CEPACOL SORE THROAT) 15-3.6 MG lozenge Take 1 lozenge by mouth every 2 hours as needed for Sore Throat (Patient not taking: Reported on 6/4/2021) 168 lozenge 0     No current facility-administered medications for this visit.      Objective:     /71   Pulse 67   Temp 97.2 °F (36.2 °C)   SpO2 100%     General Appearance: alert, cooperative, in no acute distress and appears stated age  Head: normocephalic; atraumatic  Eyes: sclerae/conjunctivae clear   Neck: No carotid bruit; well-healed surgical scar anterior neck; full ROM without cervicalgia  Lungs: clear to auscultation bilaterally, respirations unlabored   Heart: regular rate and rhythm--no murmur  Extremities:  no cyanosis or edema  Pulses: 2+ and symmetric all extremities  Skin: color, texture and turgor normal, no rashes or lesions     Mental Status: alert; oriented x4--very pleasant as always    Appropriate attention/concentration  Intact memories and fundus of knowledge    Speech: no dysarthria  Language: no aphasias    Cranial Nerves:  I: smell    II: visual acuity     II: visual fields Full to confrontation   II: pupils Mild physiologic anisocoria L>R   III,VII: ptosis None   III,IV,VI: extraocular muscles  Full ROM without nystagmus   V: mastication Normal   V: facial light touch sensation  Slightly decreased L cheek--PP intact   V,VII: corneal reflex     VII: facial muscle function - upper  Normal   VII: facial muscle function - lower Normal   VIII: hearing Normal   IX: soft palate elevation  Normal   IX,X: gag reflex    XI: trapezius strength  5/5   XI: sternocleidomastoid strength 5/5   XI: neck extension strength  5/5   XII: tongue strength  Normal     Motor:  5/5 throughout   No drift  Increased tone in legs--some decreased bulk in calves  No abnormal movements    Sensory:  LT normal in all limbs  PP normal in all limbs  Vibration moderately impaired at both ankles =    Coordination:   FTN FFM, JOHN, HS intact bilaterally     Gait:  Narrow based, cautious, slight drift to the R upon turning and then corrects    DTR:   2+ arms  Right Quadriceps reflex 3+  Left Quadriceps reflex 3+  Right Achilles reflex 1+  Left Achilles reflex 1+     No Roper's    No pathological reflexes    Laboratory/Radiology:      No current labs or images to review    Assessment:     Bilateral facial and distal limb paresthesias: Strongly suspect side effects of topiramate, as she was re-started on this medication back in January prior to the symptom onset. Bilateral symptoms not consistent with ischemic event and her neuro exam is essentially nonfocal.  Another common side effect of topiramate is word finding troubles and concentration issues. With her weight loss, she may be experiencing more prominent side effects with this medication than she has in the past. There is no current suspicion for new neurologic abnormalities to warrant any repeat imaging. Cervical myelopathy: Status post anterior fusion 9/2020. With some residual gait abnormalities, which also may be exaggerated by med side effects in the setting of 30# weight loss. No signs of new issues on exam    Chronic migraine headaches with aura: 4 HA days per month on max dose Aimovig. Triptan effective for rescue.      Lumbosacral radiculopathy: controlled on gabapentin     Plan:     No need for r/p neuroimaging at this time    Consider reduction in topiramate    Continue Aimovig 140 mg monthly    Continue Maxalt 10 mg PRN    Fall precautions and stroke s/s reviewed    Recommend she establish with neuro in [place] when she moves on 6/22    Call for follow up with me if she does not move to [facility] DIANA, APRN - DEJON  4:11 PM  6/3/2021

## 2021-06-03 NOTE — TELEPHONE ENCOUNTER
Patient called in stating that she has been having instances where her head and face will go numb and have tingling, and instances where she has stumbling when she walks. Her PCP told her that it is out of her expertise and she should contact us to see if she may need an MRI or see Pennie Suarez sooner than her July appointment. Please advise.

## 2021-06-04 ENCOUNTER — TELEPHONE (OUTPATIENT)
Dept: NEUROLOGY | Age: 56
End: 2021-06-04

## 2021-06-04 ENCOUNTER — OFFICE VISIT (OUTPATIENT)
Dept: NEUROLOGY | Age: 56
End: 2021-06-04
Payer: MEDICARE

## 2021-06-04 VITALS
OXYGEN SATURATION: 100 % | SYSTOLIC BLOOD PRESSURE: 117 MMHG | TEMPERATURE: 97.2 F | DIASTOLIC BLOOD PRESSURE: 71 MMHG | HEART RATE: 67 BPM

## 2021-06-04 DIAGNOSIS — T88.7XXA MEDICATION SIDE EFFECTS: Primary | ICD-10-CM

## 2021-06-04 PROCEDURE — 99214 OFFICE O/P EST MOD 30 MIN: CPT | Performed by: NURSE PRACTITIONER

## 2021-06-04 RX ORDER — ERENUMAB-AOOE 140 MG/ML
140 INJECTION, SOLUTION SUBCUTANEOUS
Qty: 3 PEN | Refills: 1 | Status: SHIPPED
Start: 2021-06-04 | End: 2021-08-24 | Stop reason: SDUPTHER

## 2021-06-04 NOTE — PATIENT INSTRUCTIONS
Patient Education        topiramate  Pronunciation:  toe PYRE a mate  Brand:  Qudexy XR Sprinkle, Topamax, Topamax Sprinkle, Trokendi XR  What is the most important information I should know about topiramate? Topiramate may cause vision problems that can be permanent if not treated quickly. Call your doctor right away if you have a sudden decrease in vision. This medicine may increase the risk of a birth defect called cleft lip and palate in a . There may be other medications that are safer to use during pregnancy. Tell your doctor right away if you become pregnant. Topiramate can increase body temperature and decrease sweating, which may lead to life-threatening dehydration. Tell your doctor if you have decreased sweating, high fever, and hot dry skin. Topiramate can also increase the level of acid in your blood (metabolic acidosis). Tell your doctor if you have  irregular heartbeats, shortness of breath, loss of appetite, or trouble thinking. Some people have thoughts about suicide while taking seizure medicine. Stay alert to changes in your mood or symptoms. Report any new or worsening symptoms to your doctor. Do not stop using topiramate suddenly or you could have increased seizures. What is topiramate? Topiramate is a seizure medicine, also called an anticonvulsant. Topiramate is used to treat certain types of seizures in adults and children who are at least 3years old. Trokendi XR is for use in adults and children who are at least 10years old. Some brands of topiramate are also used to prevent migraine headaches in adults and teenagers who are at least 15years old. These medicines will only prevent  migraine headaches or reduce the number of attacks, but will not treat a headache that has already begun. Topiramate may also be used for purposes not listed in this medication guide. What should I discuss with my healthcare provider before taking topiramate?   You should not use topiramate safe to breastfeed a baby while you are using this medicine. Ask your doctor about any risks. How should I take topiramate? Follow all directions on your prescription label and read all medication guides or instruction sheets. Your doctor may occasionally change your dose. Use the medicine exactly as directed. Topiramate can be taken with or without food. Swallow the tablet  whole and do not crush, chew, or break it. The Trokendi XR extended-release capsule must be swallowed whole. Do not break or open. If you cannot swallow a Qudexy XR or Topamax Sprinkle Capsule whole, open it and sprinkle the medicine into a spoonful of applesauce or other soft food. Swallow the mixture right away without chewing. Do not save it for later use. Carefully follow the swallowing instructions for your medicine. Topiramate doses are sometimes based on weight in children. Your child's dose needs may change if the child gains or loses weight. Drink plenty of liquids while you are taking topiramate, to prevent kidney stones or an electrolyte imbalance. You will need frequent medical tests. If you need surgery, tell the surgeon ahead of time that you are using topiramate. Any medical care provider who treats you should know that you take seizure medication. Do not stop using topiramate suddenly, even if you feel fine. Stopping suddenly may cause increased seizures. Follow your doctor's instructions about tapering your dose. Call your doctor if your seizures get worse or you have them more often while taking topiramate. Store at cool room temperature away from moisture, light, and high heat. What happens if I miss a dose? Take the medicine as soon as you can. Do not take two doses at one time. Skip a missed Topamax  dose if your next dose is due in less than 6 hours. Call your doctor if you have missed more than one dose. What happens if I overdose?   Seek emergency medical attention or call the Poison Help line at [phone]. An overdose of topiramate can be fatal. Overdose can cause drowsiness, agitation, depression, double vision, thinking problems, problems with speech or coordination, fainting, and seizure (convulsions). What should I avoid while taking topiramate? Do not drink alcohol. Dangerous side effects or increased seizures may occur. Avoid becoming overheated or dehydrated in hot weather. Topiramate can increase body temperature and decrease sweating, leading to life-threatening dehydration (especially in children). Avoid the use of a ketogenic or \"ketosis\" diet (high in fat, low in carbohydrates) while you are taking topiramate. Topiramate may cause blurred vision or impair your thinking or reactions. Avoid driving or operating machinery until you know how this medicine will affect you. Also avoid activities that could be dangerous if you have an unexpected seizure, such as swimming or climbing in high places. What are the possible side effects of topiramate? Get emergency medical help if you have signs of an allergic reaction (hives, difficult breathing, swelling in your face or throat) or a severe skin reaction (fever, sore throat, burning eyes, skin pain, red or purple skin rash with blistering and peeling). Report any new or worsening mood symptoms to your doctor, such as: mood or behavior changes, anxiety, panic attacks, trouble sleeping, or if you feel impulsive, irritable, agitated, hostile, aggressive, restless, hyperactive (mentally or physically), depressed, or have thoughts about suicide or hurting yourself.   Call your doctor at once if you have:  · a skin rash, no matter how mild;  · vision problems, blurred vision, eye pain or redness, sudden vision loss (can be permanent if not treated quickly);  · confusion, problems with thinking or memory, trouble concentrating, problems with speech;  · dehydration symptoms --decreased sweating, high fever, hot and dry skin;  · signs of a kidney stone --severe pain in your side or lower back, painful or difficult urination;  · signs of too much acid in your blood --irregular heartbeats, feeling tired, loss of appetite, trouble thinking, feeling short of breath; or  · signs of too much ammonia in your blood --vomiting, unexplained weakness, feeling like you might pass out. Common side effects may include:  · dizziness, drowsiness, tired feeling, slow reactions;  · problems with speech or memory;  · abnormal vision;  · numbness or tingling in your arms and legs, decreased sensation (especially in the skin);  · changes in your sense of taste;  · feeling nervous;  · nausea, diarrhea, stomach pain, loss of appetite;  · fever, weight loss; or  · cold symptoms such as stuffy nose, sneezing, sore throat. This is not a complete list of side effects and others may occur. Call your doctor for medical advice about side effects. You may report side effects to FDA at 9-162-FDA-4694. What other drugs will affect topiramate? Using topiramate with other drugs that make you drowsy can worsen this effect. Ask your doctor before using opioid medication, a sleeping pill, a muscle relaxer, or medicine for anxiety or depression. Tell your doctor about all your other medicines, especially:  · zonisamide;  · birth control pills;  · divalproex, valproic acid; or  · other glaucoma medications, including eye drops. This list is not complete. Other drugs may affect topiramate, including prescription and over-the-counter medicines, vitamins, and herbal products. Not all possible drug interactions are listed here. Where can I get more information? Your pharmacist can provide more information about topiramate. Remember, keep this and all other medicines out of the reach of children, never share your medicines with others, and use this medication only for the indication prescribed.    Every effort has been made to ensure that the information provided by [address] [place] [address] Dr HERNANDEZ ('[employer]') is accurate, up-to-date, and complete, but no guarantee is made to that effect. Drug information contained herein may be time sensitive. [employer] information has been compiled for use by healthcare practitioners and consumers in the [place] and therefore [employer] does not warrant that uses outside of the [place] are appropriate, unless specifically indicated otherwise. [employer]'s drug information does not endorse drugs, diagnose patients or recommend therapy. [employer]'s drug information is an informational resource designed to assist licensed healthcare practitioners in caring for their patients and/or to serve consumers viewing this service as a supplement to, and not a substitute for, the expertise, skill, knowledge and judgment of healthcare practitioners. The absence of a warning for a given drug or drug combination in no way should be construed to indicate that the drug or drug combination is safe, effective or appropriate for any given patient. [facility] does not assume any responsibility for any aspect of healthcare administered with the aid of information [employer] provides. The information contained herein is not intended to cover all possible uses, directions, precautions, warnings, drug interactions, allergic reactions, or adverse effects. If you have questions about the drugs you are taking, check with your doctor, nurse or pharmacist.  Copyright 3596-6431 [address]: 20.01. Revision date: 11/24/2020. Care instructions adapted under license by [facility] [place] ([facility]). If you have questions about a medical condition or this instruction, always ask your healthcare professional. [place] [address] any warranty or liability for your use of this information.

## 2021-08-06 DIAGNOSIS — M50.20 CERVICAL HERNIATED DISC: ICD-10-CM

## 2021-08-06 DIAGNOSIS — Z98.1 S/P CERVICAL SPINAL FUSION: Primary | ICD-10-CM

## 2021-08-24 DIAGNOSIS — M54.17 L-S RADICULOPATHY: ICD-10-CM

## 2021-08-25 RX ORDER — ERENUMAB-AOOE 140 MG/ML
140 INJECTION, SOLUTION SUBCUTANEOUS
Qty: 3 PEN | Refills: 1 | Status: SHIPPED | OUTPATIENT
Start: 2021-08-25

## 2021-08-25 RX ORDER — GABAPENTIN 400 MG/1
400 CAPSULE ORAL 3 TIMES DAILY
Qty: 270 CAPSULE | Refills: 0 | Status: SHIPPED | OUTPATIENT
Start: 2021-08-25 | End: 2021-11-23

## 2021-08-26 NOTE — TELEPHONE ENCOUNTER
Pharmacy did not receive via e-scribe. I called the pharmacy and gave a verbal order.     Electronically signed by Aileen Monterroso MA on 8/26/2021 at 11:49 AM

## 2022-07-05 RX ORDER — ERENUMAB-AOOE 140 MG/ML
140 INJECTION, SOLUTION SUBCUTANEOUS
Refills: 5 | OUTPATIENT
Start: 2022-07-05

## 2022-07-05 NOTE — TELEPHONE ENCOUNTER
She has not been seen since June 2021 and told me she was moving to [place].  Needs an appt before more refills will be given

## 2023-07-09 NOTE — PLAN OF CARE
Problem: Falls - Risk of:  Goal: Will remain free from falls  Description: Will remain free from falls  9/1/2020 2006 by Rob Morin RN  Outcome: Met This Shift  9/1/2020 1049 by Nohelia Ventura RN  Outcome: Ongoing  Goal: Absence of physical injury  Description: Absence of physical injury  9/1/2020 2006 by Rob Morin RN  Outcome: Met This Shift  9/1/2020 1049 by Nohelia Ventura RN  Outcome: Ongoing     Problem: Pain:  Description: Pain management should include both nonpharmacologic and pharmacologic interventions.   Goal: Pain level will decrease  Description: Pain level will decrease  9/1/2020 2006 by Rob Morin RN  Outcome: Met This Shift  9/1/2020 1049 by Nohelia Ventura RN  Outcome: Ongoing  Goal: Control of acute pain  Description: Control of acute pain  9/1/2020 2006 by Rob Morin RN  Outcome: Met This Shift  9/1/2020 1049 by Nohelia Ventura RN  Outcome: Ongoing  Goal: Control of chronic pain  Description: Control of chronic pain  9/1/2020 2006 by Rob Morin RN  Outcome: Met This Shift  9/1/2020 1049 by Nohelia Ventura RN  Outcome: Ongoing     Problem: Neurological  Goal: Maximum potential motor/sensory/cognitive function  9/1/2020 2006 by Rob Morin RN  Outcome: Met This Shift  9/1/2020 1049 by Nohelia Ventura RN  Outcome: Ongoing no

## (undated) DEVICE — TOTAL TRAY, DB, 100% SILI FOLEY, 16FR 10: Brand: MEDLINE

## (undated) DEVICE — DRILL SYSTEM 7

## (undated) DEVICE — CASPAR DISTR PIN12MMSTER: Brand: AESCULAP

## (undated) DEVICE — GLOVE ORANGE PI 8   MSG9080

## (undated) DEVICE — READY WET SKIN SCRUB TRAY-LF: Brand: MEDLINE INDUSTRIES, INC.

## (undated) DEVICE — NEEDLE SPNL L3.5IN PNK HUB S STL REG WALL FIT STYL W/ QNCKE

## (undated) DEVICE — GAUZE,SPONGE,4"X4",16PLY,XRAY,STRL,LF: Brand: MEDLINE

## (undated) DEVICE — SET SPINAL NEURO STNEU1

## (undated) DEVICE — DRILL BIT, 12MM

## (undated) DEVICE — SET NEURO BLK BELT MICRO DISCECTOMY

## (undated) DEVICE — TUBING, SUCTION, 3/16" X 12', STRAIGHT: Brand: MEDLINE

## (undated) DEVICE — CLOTH SURG PREP PREOPERATIVE CHLORHEXIDINE GLUC 2% READYPREP

## (undated) DEVICE — E-Z CLEAN, NON-STICK, PTFE COATED, ELECTROSURGICAL BLADE ELECTRODE, MODIFIED EXTENDED INSULATION, 2.5 INCH (6.35 CM): Brand: MEGADYNE

## (undated) DEVICE — EXTRAS UGOKWE

## (undated) DEVICE — TOWEL,OR,DSP,ST,BLUE,DLX,10/PK,8PK/CS: Brand: MEDLINE

## (undated) DEVICE — GLOVE SURG SZ 85 STD WHT LTX SYN POLYMER BEAD REINF ANTI RL

## (undated) DEVICE — SET NEURO BLKBELT RETRACTOR

## (undated) DEVICE — SPONGE,PEANUT,XRAY,ST,SM,3/8",5/CARD: Brand: MEDLINE INDUSTRIES, INC.

## (undated) DEVICE — GARMENT,MEDLINE,DVT,INT,CALF,MED, GEN2: Brand: MEDLINE

## (undated) DEVICE — SHEET, T, LAPAROTOMY, STERILE: Brand: MEDLINE

## (undated) DEVICE — BANDAGE,GAUZE,4.5"X4.1YD,STERILE,LF: Brand: MEDLINE

## (undated) DEVICE — 3.0MM PRECISION NEURO (MATCH HEAD)

## (undated) DEVICE — 1.5L THIN WALL CAN: Brand: CRD

## (undated) DEVICE — LABEL MED 4 IN SURG PANEL W/ PEN STRL

## (undated) DEVICE — INTENDED FOR TISSUE SEPARATION, AND OTHER PROCEDURES THAT REQUIRE A SHARP SURGICAL BLADE TO PUNCTURE OR CUT.: Brand: BARD-PARKER ® STAINLESS STEEL BLADES

## (undated) DEVICE — CODMAN® SURGICAL PATTIES 1/2" X 1/2" (1.27CM X 1.27CM): Brand: CODMAN®

## (undated) DEVICE — INSTRUMENT EXTRAS ACF SINGLE ORANGE OR W

## (undated) DEVICE — GRADUATE

## (undated) DEVICE — TUBING SUCT 12FR MAL ALUM SHFT FN CAP VENT UNIV CONN W/ OBT

## (undated) DEVICE — GOWN,SIRUS,FABRNF,XL,20/CS: Brand: MEDLINE

## (undated) DEVICE — COVER,TABLE,60X90,STERILE: Brand: MEDLINE

## (undated) DEVICE — CODMAN® SURGICAL PATTIES 1/2" X 1" (1.27CM X 2.54CM): Brand: CODMAN®

## (undated) DEVICE — Device

## (undated) DEVICE — BLADE CLIPPER GEN PURP NS

## (undated) DEVICE — SPONGE,DISSECTOR,ROUND CHERRY,XR,ST,5/PK: Brand: MEDLINE

## (undated) DEVICE — NEEDLE HYPO 18GA L1.5IN PNK POLYPR HUB S STL REG BVL STR

## (undated) DEVICE — Z DUP USE 2257490 ADHESIVE SKIN CLSRE 036ML TPCL 2CTL CNCRLTE HIGH VSCSTY DRMB

## (undated) DEVICE — SURGICAL PROCEDURE PACK LAMINECTOMY LUMBAR

## (undated) DEVICE — 3M(TM) MEDIPORE(TM) +PAD SOFT CLOTH ADHESIVE WOUND DRESSING 3569: Brand: 3M™ MEDIPORE™

## (undated) DEVICE — SWABSTICK SURG PREP BENZOIN TINCTURE SINGLE ST

## (undated) DEVICE — DOUBLE BASIN SET: Brand: MEDLINE INDUSTRIES, INC.

## (undated) DEVICE — 3M™ IOBAN™ 2 ANTIMICROBIAL INCISE DRAPE 6640EZ: Brand: IOBAN™ 2